# Patient Record
Sex: FEMALE | Race: WHITE | NOT HISPANIC OR LATINO | URBAN - METROPOLITAN AREA
[De-identification: names, ages, dates, MRNs, and addresses within clinical notes are randomized per-mention and may not be internally consistent; named-entity substitution may affect disease eponyms.]

---

## 2017-02-03 ENCOUNTER — EMERGENCY (EMERGENCY)
Facility: HOSPITAL | Age: 52
LOS: 1 days | Discharge: HOME | End: 2017-02-03
Admitting: EMERGENCY MEDICINE
Payer: COMMERCIAL

## 2017-02-03 DIAGNOSIS — R10.9 UNSPECIFIED ABDOMINAL PAIN: ICD-10-CM

## 2017-02-03 PROCEDURE — 36415 COLL VENOUS BLD VENIPUNCTURE: CPT

## 2017-02-03 PROCEDURE — 99284 EMERGENCY DEPT VISIT MOD MDM: CPT

## 2017-02-03 PROCEDURE — 80048 BASIC METABOLIC PNL TOTAL CA: CPT

## 2017-02-03 PROCEDURE — 96375 TX/PRO/DX INJ NEW DRUG ADDON: CPT

## 2017-02-03 PROCEDURE — 85027 COMPLETE CBC AUTOMATED: CPT

## 2017-02-03 PROCEDURE — 74176 CT ABD & PELVIS W/O CONTRAST: CPT | Mod: 26

## 2017-02-03 PROCEDURE — 74176 CT ABD & PELVIS W/O CONTRAST: CPT

## 2017-02-03 PROCEDURE — 99284 EMERGENCY DEPT VISIT MOD MDM: CPT | Mod: 25

## 2017-02-03 PROCEDURE — 96374 THER/PROPH/DIAG INJ IV PUSH: CPT

## 2018-12-02 ENCOUNTER — RESULT CHARGE (OUTPATIENT)
Age: 53
End: 2018-12-02

## 2018-12-03 ENCOUNTER — APPOINTMENT (OUTPATIENT)
Dept: OTHER | Facility: CLINIC | Age: 53
End: 2018-12-03
Payer: COMMERCIAL

## 2018-12-03 ENCOUNTER — TRANSCRIPTION ENCOUNTER (OUTPATIENT)
Age: 53
End: 2018-12-03

## 2018-12-03 VITALS
WEIGHT: 182 LBS | HEIGHT: 70.5 IN | HEART RATE: 76 BPM | OXYGEN SATURATION: 98 % | RESPIRATION RATE: 16 BRPM | DIASTOLIC BLOOD PRESSURE: 77 MMHG | SYSTOLIC BLOOD PRESSURE: 124 MMHG | BODY MASS INDEX: 25.76 KG/M2

## 2018-12-03 DIAGNOSIS — Z85.828 PERSONAL HISTORY OF OTHER MALIGNANT NEOPLASM OF SKIN: ICD-10-CM

## 2018-12-03 DIAGNOSIS — Z03.89 ENCOUNTER FOR OBSERVATION FOR OTHER SUSPECTED DISEASES AND CONDITIONS RULED OUT: ICD-10-CM

## 2018-12-03 DIAGNOSIS — Z85.820 PERSONAL HISTORY OF MALIGNANT MELANOMA OF SKIN: ICD-10-CM

## 2018-12-03 PROCEDURE — 94060 EVALUATION OF WHEEZING: CPT

## 2018-12-03 PROCEDURE — 96150: CPT

## 2018-12-03 PROCEDURE — 99386 PREV VISIT NEW AGE 40-64: CPT | Mod: 25

## 2018-12-04 LAB
ALBUMIN SERPL ELPH-MCNC: 4.3 G/DL
ALP BLD-CCNC: 40 U/L
ALT SERPL-CCNC: 8 U/L
ANION GAP SERPL CALC-SCNC: 12 MMOL/L
APPEARANCE: CLEAR
AST SERPL-CCNC: 14 U/L
BACTERIA: NEGATIVE
BASOPHILS # BLD AUTO: 0.02 K/UL
BASOPHILS NFR BLD AUTO: 0.4 %
BILIRUB SERPL-MCNC: 0.6 MG/DL
BILIRUBIN URINE: NEGATIVE
BLOOD URINE: ABNORMAL
BUN SERPL-MCNC: 13 MG/DL
CALCIUM SERPL-MCNC: 9.1 MG/DL
CHLORIDE SERPL-SCNC: 106 MMOL/L
CHOLEST SERPL-MCNC: 184 MG/DL
CHOLEST/HDLC SERPL: 3.1 RATIO
CO2 SERPL-SCNC: 25 MMOL/L
COLOR: YELLOW
CREAT SERPL-MCNC: 0.57 MG/DL
EOSINOPHIL # BLD AUTO: 0.15 K/UL
EOSINOPHIL NFR BLD AUTO: 2.8 %
GLUCOSE QUALITATIVE U: NEGATIVE MG/DL
GLUCOSE SERPL-MCNC: 79 MG/DL
HCT VFR BLD CALC: 38.2 %
HDLC SERPL-MCNC: 59 MG/DL
HGB BLD-MCNC: 12.3 G/DL
HYALINE CASTS: 1 /LPF
IMM GRANULOCYTES NFR BLD AUTO: 0 %
KETONES URINE: NEGATIVE
LDLC SERPL CALC-MCNC: 101 MG/DL
LEUKOCYTE ESTERASE URINE: ABNORMAL
LYMPHOCYTES # BLD AUTO: 2.15 K/UL
LYMPHOCYTES NFR BLD AUTO: 40.7 %
MAN DIFF?: NORMAL
MCHC RBC-ENTMCNC: 30 PG
MCHC RBC-ENTMCNC: 32.2 GM/DL
MCV RBC AUTO: 93.2 FL
MICROSCOPIC-UA: NORMAL
MONOCYTES # BLD AUTO: 0.5 K/UL
MONOCYTES NFR BLD AUTO: 9.5 %
NEUTROPHILS # BLD AUTO: 2.46 K/UL
NEUTROPHILS NFR BLD AUTO: 46.6 %
NITRITE URINE: NEGATIVE
PH URINE: 8
PLATELET # BLD AUTO: 317 K/UL
POTASSIUM SERPL-SCNC: 4.6 MMOL/L
PROT SERPL-MCNC: 6.4 G/DL
PROTEIN URINE: NEGATIVE MG/DL
RBC # BLD: 4.1 M/UL
RBC # FLD: 13 %
RED BLOOD CELLS URINE: 1 /HPF
SODIUM SERPL-SCNC: 143 MMOL/L
SPECIFIC GRAVITY URINE: 1.01
SQUAMOUS EPITHELIAL CELLS: 3 /HPF
TRIGL SERPL-MCNC: 118 MG/DL
UROBILINOGEN URINE: NEGATIVE MG/DL
WBC # FLD AUTO: 5.28 K/UL
WHITE BLOOD CELLS URINE: 2 /HPF

## 2019-01-04 ENCOUNTER — APPOINTMENT (OUTPATIENT)
Dept: PULMONOLOGY | Facility: CLINIC | Age: 54
End: 2019-01-04
Payer: COMMERCIAL

## 2019-01-04 VITALS
TEMPERATURE: 98.6 F | HEART RATE: 80 BPM | HEIGHT: 70.5 IN | WEIGHT: 187 LBS | DIASTOLIC BLOOD PRESSURE: 76 MMHG | BODY MASS INDEX: 26.47 KG/M2 | OXYGEN SATURATION: 98 % | RESPIRATION RATE: 18 BRPM | SYSTOLIC BLOOD PRESSURE: 109 MMHG

## 2019-01-04 DIAGNOSIS — J06.9 ACUTE UPPER RESPIRATORY INFECTION, UNSPECIFIED: ICD-10-CM

## 2019-01-04 PROCEDURE — ZZZZZ: CPT

## 2019-01-04 PROCEDURE — 94729 DIFFUSING CAPACITY: CPT

## 2019-01-04 PROCEDURE — 94726 PLETHYSMOGRAPHY LUNG VOLUMES: CPT

## 2019-01-04 PROCEDURE — 99204 OFFICE O/P NEW MOD 45 MIN: CPT | Mod: 25

## 2019-01-04 PROCEDURE — 94010 BREATHING CAPACITY TEST: CPT

## 2019-02-21 ENCOUNTER — FORM ENCOUNTER (OUTPATIENT)
Age: 54
End: 2019-02-21

## 2019-02-22 ENCOUNTER — APPOINTMENT (OUTPATIENT)
Dept: OTOLARYNGOLOGY | Facility: CLINIC | Age: 54
End: 2019-02-22
Payer: COMMERCIAL

## 2019-02-22 VITALS
SYSTOLIC BLOOD PRESSURE: 109 MMHG | HEART RATE: 86 BPM | WEIGHT: 187 LBS | DIASTOLIC BLOOD PRESSURE: 74 MMHG | HEIGHT: 70.5 IN | BODY MASS INDEX: 26.47 KG/M2

## 2019-02-22 DIAGNOSIS — J32.9 CHRONIC SINUSITIS, UNSPECIFIED: ICD-10-CM

## 2019-02-22 PROCEDURE — 92557 COMPREHENSIVE HEARING TEST: CPT

## 2019-02-22 PROCEDURE — 92567 TYMPANOMETRY: CPT

## 2019-02-22 PROCEDURE — 31231 NASAL ENDOSCOPY DX: CPT

## 2019-02-22 PROCEDURE — 99204 OFFICE O/P NEW MOD 45 MIN: CPT | Mod: 25

## 2019-02-22 NOTE — PHYSICAL EXAM
[Nasal Endoscopy Performed] : nasal endoscopy was performed, see procedure section for findings [] : septum deviated to the left [Midline] : trachea located in midline position [Normal] : no rashes

## 2019-02-22 NOTE — ASSESSMENT
[FreeTextEntry1] : Patient  9/11 program having bouts of recurrent sinusitis now for quite some time. Treated with numerous courses of antibiotics is here for evaluation. On examination endoscopically she has a significantly deviated septum the left nasal cavity no evidence of any tumors masses or polyps. Center for a CAT scan of the definitively evaluate her sinuses and determine if any additional interventions indicated. Also the baseline hearing test on her after examination of her ears were perfectly normal she has a slight dip at 2000 Hz bilaterally the needs to be monitored and followed. We will get back to her once reviewed a CAT scan for definitive plan.

## 2019-02-22 NOTE — REVIEW OF SYSTEMS
[Nasal Congestion] : nasal congestion [Recurrent Sinus Infections] : recurrent sinus infections [Sinus Pain] : sinus pain [Sinus Pressure] : sinus pressure [Negative] : Heme/Lymph [Ear Noises] : ear noises

## 2019-02-22 NOTE — HISTORY OF PRESENT ILLNESS
[de-identified] : Patient has had issues with nasal issues and recurrent sinus infections. She has had about 7 sinus infections int he last year and has to be treated with antibitoics each time. The last round of antibtoics was 1 month ago. She was exposed during 9/11 and isnt sure if this is related to that. SHe has not had a CT of the sinuses. WHen she gets sinus infections she gets sinus pressure under the eyes bilaterally and generally in the face that is a moderate dull and aching. She gets swelling under the eyes and in the face along with moderate to severe nasal congestion. She admits to occasional ringing in the eras but no dizzziness ormajor issues hearing

## 2019-02-26 ENCOUNTER — FORM ENCOUNTER (OUTPATIENT)
Age: 54
End: 2019-02-26

## 2019-02-27 ENCOUNTER — TRANSCRIPTION ENCOUNTER (OUTPATIENT)
Age: 54
End: 2019-02-27

## 2019-02-28 ENCOUNTER — TRANSCRIPTION ENCOUNTER (OUTPATIENT)
Age: 54
End: 2019-02-28

## 2019-03-01 ENCOUNTER — TRANSCRIPTION ENCOUNTER (OUTPATIENT)
Age: 54
End: 2019-03-01

## 2019-03-04 ENCOUNTER — FORM ENCOUNTER (OUTPATIENT)
Age: 54
End: 2019-03-04

## 2019-03-05 ENCOUNTER — APPOINTMENT (OUTPATIENT)
Dept: CT IMAGING | Facility: CLINIC | Age: 54
End: 2019-03-05
Payer: COMMERCIAL

## 2019-03-05 ENCOUNTER — OUTPATIENT (OUTPATIENT)
Dept: OUTPATIENT SERVICES | Facility: HOSPITAL | Age: 54
LOS: 1 days | End: 2019-03-05
Payer: COMMERCIAL

## 2019-03-05 DIAGNOSIS — J32.9 CHRONIC SINUSITIS, UNSPECIFIED: ICD-10-CM

## 2019-03-05 PROCEDURE — 70486 CT MAXILLOFACIAL W/O DYE: CPT

## 2019-03-05 PROCEDURE — 70486 CT MAXILLOFACIAL W/O DYE: CPT | Mod: 26

## 2019-03-12 ENCOUNTER — APPOINTMENT (OUTPATIENT)
Dept: OTOLARYNGOLOGY | Facility: CLINIC | Age: 54
End: 2019-03-12
Payer: COMMERCIAL

## 2019-03-12 VITALS
WEIGHT: 187 LBS | HEART RATE: 75 BPM | BODY MASS INDEX: 26.47 KG/M2 | DIASTOLIC BLOOD PRESSURE: 77 MMHG | HEIGHT: 70.5 IN | SYSTOLIC BLOOD PRESSURE: 106 MMHG

## 2019-03-12 DIAGNOSIS — J34.2 DEVIATED NASAL SEPTUM: ICD-10-CM

## 2019-03-12 DIAGNOSIS — R09.81 NASAL CONGESTION: ICD-10-CM

## 2019-03-12 PROCEDURE — 99214 OFFICE O/P EST MOD 30 MIN: CPT | Mod: 25

## 2019-03-12 PROCEDURE — 31231 NASAL ENDOSCOPY DX: CPT

## 2019-03-12 NOTE — HISTORY OF PRESENT ILLNESS
[de-identified] : marguerite comes in today ready to discuss surgical options as she continues to have nasal congsetion bilaterally with pain and pressure in the left side of the face that ocasionally happens as a dull ache in the right side of the face. He right aer feels clogged recently and she has been doing the nasal sprays. SHe cannot get air through the left nostril even with the nasal sprays

## 2019-03-12 NOTE — REVIEW OF SYSTEMS
[Nasal Congestion] : nasal congestion [Sinus Pain] : sinus pain [Sinus Pressure] : sinus pressure [Negative] : Heme/Lymph

## 2019-03-12 NOTE — ASSESSMENT
[FreeTextEntry1] : Patient follows up CAT scan reviewed showed a severely deviated septum she still has sinus-like symptoms of pressure we will proceed with a septoplasty turbinoplasty and functional endoscopic balloon signing the plastic risks and benefits were discussed with her and understood and all of her questions were answered.

## 2019-03-19 ENCOUNTER — APPOINTMENT (OUTPATIENT)
Dept: OTOLARYNGOLOGY | Facility: CLINIC | Age: 54
End: 2019-03-19

## 2019-04-10 ENCOUNTER — APPOINTMENT (OUTPATIENT)
Dept: OTOLARYNGOLOGY | Facility: AMBULATORY SURGERY CENTER | Age: 54
End: 2019-04-10

## 2019-04-11 ENCOUNTER — APPOINTMENT (OUTPATIENT)
Dept: OTOLARYNGOLOGY | Facility: CLINIC | Age: 54
End: 2019-04-11

## 2019-04-15 ENCOUNTER — APPOINTMENT (OUTPATIENT)
Dept: OTOLARYNGOLOGY | Facility: CLINIC | Age: 54
End: 2019-04-15

## 2019-09-03 ENCOUNTER — APPOINTMENT (OUTPATIENT)
Dept: OTHER | Facility: CLINIC | Age: 54
End: 2019-09-03

## 2019-09-03 VITALS
HEART RATE: 82 BPM | WEIGHT: 187 LBS | OXYGEN SATURATION: 95 % | SYSTOLIC BLOOD PRESSURE: 118 MMHG | DIASTOLIC BLOOD PRESSURE: 80 MMHG | HEIGHT: 70.5 IN | RESPIRATION RATE: 16 BRPM | BODY MASS INDEX: 26.47 KG/M2

## 2019-09-03 RX ORDER — NITROFURANTOIN (MONOHYDRATE/MACROCRYSTALS) 25; 75 MG/1; MG/1
100 CAPSULE ORAL
Qty: 14 | Refills: 0 | Status: DISCONTINUED | COMMUNITY
Start: 2019-02-25 | End: 2019-09-03

## 2019-09-03 RX ORDER — OMEPRAZOLE 40 MG/1
40 CAPSULE, DELAYED RELEASE ORAL
Refills: 0 | Status: DISCONTINUED | COMMUNITY
Start: 2018-12-03 | End: 2019-09-03

## 2019-09-03 RX ORDER — CIPROFLOXACIN HYDROCHLORIDE 500 MG/1
500 TABLET, FILM COATED ORAL
Qty: 10 | Refills: 0 | Status: DISCONTINUED | COMMUNITY
Start: 2018-09-26 | End: 2019-09-03

## 2019-09-03 RX ORDER — PREDNISONE 20 MG/1
20 TABLET ORAL
Qty: 10 | Refills: 0 | Status: DISCONTINUED | COMMUNITY
Start: 2019-02-01 | End: 2019-09-03

## 2019-09-03 RX ORDER — LEVOFLOXACIN 500 MG/1
500 TABLET, FILM COATED ORAL
Qty: 7 | Refills: 0 | Status: DISCONTINUED | COMMUNITY
Start: 2019-01-27 | End: 2019-09-03

## 2019-09-03 RX ORDER — CLARITHROMYCIN 500 MG/1
500 TABLET, FILM COATED ORAL
Qty: 14 | Refills: 0 | Status: DISCONTINUED | COMMUNITY
Start: 2019-01-23 | End: 2019-09-03

## 2019-09-03 RX ORDER — GUAIFENESIN AND CODEINE PHOSPHATE 10; 100 MG/5ML; MG/5ML
100-10 SOLUTION ORAL
Qty: 200 | Refills: 0 | Status: DISCONTINUED | COMMUNITY
Start: 2019-01-23 | End: 2019-09-03

## 2019-09-03 RX ORDER — OSELTAMIVIR PHOSPHATE 75 MG/1
75 CAPSULE ORAL
Qty: 10 | Refills: 0 | Status: DISCONTINUED | COMMUNITY
Start: 2019-01-23 | End: 2019-09-03

## 2019-09-16 ENCOUNTER — OUTPATIENT (OUTPATIENT)
Dept: OUTPATIENT SERVICES | Facility: HOSPITAL | Age: 54
LOS: 1 days | End: 2019-09-16
Payer: COMMERCIAL

## 2019-09-16 VITALS
TEMPERATURE: 98 F | DIASTOLIC BLOOD PRESSURE: 65 MMHG | WEIGHT: 188.05 LBS | RESPIRATION RATE: 16 BRPM | OXYGEN SATURATION: 99 % | SYSTOLIC BLOOD PRESSURE: 102 MMHG | HEART RATE: 71 BPM

## 2019-09-16 DIAGNOSIS — Z90.49 ACQUIRED ABSENCE OF OTHER SPECIFIED PARTS OF DIGESTIVE TRACT: Chronic | ICD-10-CM

## 2019-09-16 DIAGNOSIS — N62 HYPERTROPHY OF BREAST: ICD-10-CM

## 2019-09-16 DIAGNOSIS — Z01.818 ENCOUNTER FOR OTHER PREPROCEDURAL EXAMINATION: ICD-10-CM

## 2019-09-16 DIAGNOSIS — Z98.890 OTHER SPECIFIED POSTPROCEDURAL STATES: Chronic | ICD-10-CM

## 2019-09-16 LAB
HCG UR QL: NEGATIVE — SIGNIFICANT CHANGE UP
HCT VFR BLD CALC: 40 % — SIGNIFICANT CHANGE UP (ref 34.5–45)
HGB BLD-MCNC: 13.2 G/DL — SIGNIFICANT CHANGE UP (ref 11.5–15.5)
MCHC RBC-ENTMCNC: 29.7 PG — SIGNIFICANT CHANGE UP (ref 27–34)
MCHC RBC-ENTMCNC: 33 GM/DL — SIGNIFICANT CHANGE UP (ref 32–36)
MCV RBC AUTO: 90.1 FL — SIGNIFICANT CHANGE UP (ref 80–100)
NRBC # BLD: 0 /100 WBCS — SIGNIFICANT CHANGE UP (ref 0–0)
PLATELET # BLD AUTO: 329 K/UL — SIGNIFICANT CHANGE UP (ref 150–400)
RBC # BLD: 4.44 M/UL — SIGNIFICANT CHANGE UP (ref 3.8–5.2)
RBC # FLD: 12 % — SIGNIFICANT CHANGE UP (ref 10.3–14.5)
WBC # BLD: 6.74 K/UL — SIGNIFICANT CHANGE UP (ref 3.8–10.5)
WBC # FLD AUTO: 6.74 K/UL — SIGNIFICANT CHANGE UP (ref 3.8–10.5)

## 2019-09-16 PROCEDURE — 36415 COLL VENOUS BLD VENIPUNCTURE: CPT

## 2019-09-16 PROCEDURE — G0463: CPT

## 2019-09-16 PROCEDURE — 85027 COMPLETE CBC AUTOMATED: CPT

## 2019-09-16 PROCEDURE — 81025 URINE PREGNANCY TEST: CPT

## 2019-09-16 NOTE — H&P PST ADULT - LYMPHATIC
posterior cervical L/anterior cervical L/anterior cervical R/posterior cervical R/supraclavicular R/supraclavicular L

## 2019-09-16 NOTE — H&P PST ADULT - NSANTHOSAYNRD_GEN_A_CORE
Pt to have sleep study done in the future/No. PHIL screening performed.  STOP BANG Legend: 0-2 = LOW Risk; 3-4 = INTERMEDIATE Risk; 5-8 = HIGH Risk Pt to have "sleep study done in the future"/No. PHIL screening performed.  STOP BANG Legend: 0-2 = LOW Risk; 3-4 = INTERMEDIATE Risk; 5-8 = HIGH Risk

## 2019-09-16 NOTE — H&P PST ADULT - NEGATIVE CARDIOVASCULAR SYMPTOMS
no orthopnea/no paroxysmal nocturnal dyspnea/no palpitations/no dyspnea on exertion/no peripheral edema/no chest pain/no claudication

## 2019-09-16 NOTE — H&P PST ADULT - SKIN/BREAST COMMENTS
Pt with history of melanoma to right arm, seen by dermatologist and s/p biopsy of 2 indira on right neck and removal of skin tag on right upper chest. Pt will inform surgeon. Pt with history of melanoma to right arm, seen by dermatologist and s/p biopsy of 2 areas on right neck and removal of skin tag on right upper chest. Pt will inform surgeon.

## 2019-09-16 NOTE — H&P PST ADULT - NSICDXPROBLEM_GEN_ALL_CORE_FT
PROBLEM DIAGNOSES  Problem: Preoperative testing  Assessment and Plan: No medical clearance needed as per surgeon. CBC and UCG ordered. Pre-op instructions and surgical scrubs given and pt verbalized understanding. Called for last cardiac note with last echocardiogram and Cath report to be faxed and received in chart.       Problem: Hypertrophy of breast  Assessment and Plan: Bilateral breast reduction on 9/27/19.

## 2019-09-16 NOTE — H&P PST ADULT - HISTORY OF PRESENT ILLNESS
This is a 47 y/o female who presents to [facility] for H&P for laparoscopic cholecystectomy. Pt. reports a 2 month history of right abdominal pain referred to her back along with abdominal bloating and gas. She followed up with her PMD, ultrasound performed one week later and referred to surgeon. 53 yo female with PMH of GERD here for PST. Pt complaining of having chronic bilateral shoulder and back pain from hypertrophy of breasts. Pt takes Motrin PRN with moderate pain relief. Pt electing for bilateral breast reduction on 9/27/19.

## 2019-09-16 NOTE — H&P PST ADULT - NSICDXPASTMEDICALHX_GEN_ALL_CORE_FT
PAST MEDICAL HISTORY:  No Past Medical History PAST MEDICAL HISTORY:  Chronic GERD     Hypertrophy of breast     Insomnia     Seasonal allergies

## 2019-09-16 NOTE — H&P PST ADULT - RS GEN PE MLT RESP DETAILS PC
breath sounds equal/clear to auscultation bilaterally/respirations non-labored/normal/airway patent/good air movement

## 2019-09-16 NOTE — H&P PST ADULT - NSICDXPASTSURGICALHX_GEN_ALL_CORE_FT
PAST SURGICAL HISTORY:  S/P Shoulder Surgery right with titanium anchors- 1/2006, 7/2008, 11/2008 PAST SURGICAL HISTORY:  S/P laparoscopic cholecystectomy (2011)    S/P Mohs surgery for basal cell carcinoma (Right side of face, 2018)    S/P Shoulder Surgery (Right with titanium anchors- 1/2006, 2007, 7/2008 & 11/2008)

## 2019-09-26 ENCOUNTER — TRANSCRIPTION ENCOUNTER (OUTPATIENT)
Age: 54
End: 2019-09-26

## 2019-09-27 ENCOUNTER — OUTPATIENT (OUTPATIENT)
Dept: OUTPATIENT SERVICES | Facility: HOSPITAL | Age: 54
LOS: 1 days | End: 2019-09-27
Payer: COMMERCIAL

## 2019-09-27 ENCOUNTER — RESULT REVIEW (OUTPATIENT)
Age: 54
End: 2019-09-27

## 2019-09-27 VITALS
HEART RATE: 74 BPM | OXYGEN SATURATION: 95 % | DIASTOLIC BLOOD PRESSURE: 62 MMHG | SYSTOLIC BLOOD PRESSURE: 116 MMHG | RESPIRATION RATE: 16 BRPM

## 2019-09-27 VITALS
RESPIRATION RATE: 16 BRPM | WEIGHT: 188.05 LBS | OXYGEN SATURATION: 96 % | SYSTOLIC BLOOD PRESSURE: 117 MMHG | HEART RATE: 64 BPM | TEMPERATURE: 98 F | DIASTOLIC BLOOD PRESSURE: 83 MMHG

## 2019-09-27 DIAGNOSIS — Z98.890 OTHER SPECIFIED POSTPROCEDURAL STATES: Chronic | ICD-10-CM

## 2019-09-27 DIAGNOSIS — Z90.49 ACQUIRED ABSENCE OF OTHER SPECIFIED PARTS OF DIGESTIVE TRACT: Chronic | ICD-10-CM

## 2019-09-27 DIAGNOSIS — N62 HYPERTROPHY OF BREAST: ICD-10-CM

## 2019-09-27 DIAGNOSIS — Z01.818 ENCOUNTER FOR OTHER PREPROCEDURAL EXAMINATION: ICD-10-CM

## 2019-09-27 LAB — HCG UR QL: NEGATIVE — SIGNIFICANT CHANGE UP

## 2019-09-27 PROCEDURE — 88305 TISSUE EXAM BY PATHOLOGIST: CPT

## 2019-09-27 PROCEDURE — 19318 BREAST REDUCTION: CPT | Mod: 50

## 2019-09-27 PROCEDURE — 88305 TISSUE EXAM BY PATHOLOGIST: CPT | Mod: 26

## 2019-09-27 PROCEDURE — 81025 URINE PREGNANCY TEST: CPT

## 2019-09-27 RX ORDER — OXYCODONE HYDROCHLORIDE 5 MG/1
5 TABLET ORAL ONCE
Refills: 0 | Status: DISCONTINUED | OUTPATIENT
Start: 2019-09-27 | End: 2019-09-27

## 2019-09-27 RX ORDER — SODIUM CHLORIDE 9 MG/ML
1000 INJECTION, SOLUTION INTRAVENOUS
Refills: 0 | Status: DISCONTINUED | OUTPATIENT
Start: 2019-09-27 | End: 2019-09-27

## 2019-09-27 RX ORDER — ONDANSETRON 8 MG/1
4 TABLET, FILM COATED ORAL ONCE
Refills: 0 | Status: DISCONTINUED | OUTPATIENT
Start: 2019-09-27 | End: 2019-09-27

## 2019-09-27 RX ORDER — HYDROMORPHONE HYDROCHLORIDE 2 MG/ML
0.5 INJECTION INTRAMUSCULAR; INTRAVENOUS; SUBCUTANEOUS
Refills: 0 | Status: DISCONTINUED | OUTPATIENT
Start: 2019-09-27 | End: 2019-09-27

## 2019-09-27 RX ADMIN — HYDROMORPHONE HYDROCHLORIDE 0.5 MILLIGRAM(S): 2 INJECTION INTRAMUSCULAR; INTRAVENOUS; SUBCUTANEOUS at 11:33

## 2019-09-27 RX ADMIN — SODIUM CHLORIDE 100 MILLILITER(S): 9 INJECTION, SOLUTION INTRAVENOUS at 11:40

## 2019-09-27 RX ADMIN — HYDROMORPHONE HYDROCHLORIDE 0.5 MILLIGRAM(S): 2 INJECTION INTRAMUSCULAR; INTRAVENOUS; SUBCUTANEOUS at 11:22

## 2019-09-27 RX ADMIN — SODIUM CHLORIDE 75 MILLILITER(S): 9 INJECTION, SOLUTION INTRAVENOUS at 06:37

## 2019-09-27 RX ADMIN — OXYCODONE HYDROCHLORIDE 5 MILLIGRAM(S): 5 TABLET ORAL at 12:09

## 2019-09-27 NOTE — ASU DISCHARGE PLAN (ADULT/PEDIATRIC) - CALL YOUR DOCTOR IF YOU HAVE ANY OF THE FOLLOWING:
Fever greater than (need to indicate Fahrenheit or Celsius)/Wound/Surgical Site with redness, or foul smelling discharge or pus/Bleeding that does not stop/Pain not relieved by Medications

## 2019-09-27 NOTE — ASU DISCHARGE PLAN (ADULT/PEDIATRIC) - CARE PROVIDER_API CALL
Servando Robert (MD)  Plastic Surgery; Surgery of the Hand  [address] [place] [address]  Phone: [phone]  Fax: [phone]  Follow Up Time: Servando Robert (MD)  Plastic Surgery; Surgery of the Hand  [address] [place] [address]  Phone: [phone]  Fax: [phone]  Follow Up Time: 1 week

## 2019-09-27 NOTE — ASU PATIENT PROFILE, ADULT - PSH
S/P laparoscopic cholecystectomy  (2011)  S/P Mohs surgery for basal cell carcinoma  (Right side of face, 2018)  S/P Shoulder Surgery  (Right with titanium anchors- 1/2006, 2007, 7/2008 & 11/2008)

## 2019-09-27 NOTE — ASU DISCHARGE PLAN (ADULT/PEDIATRIC) - PAIN MANAGEMENT
Prescriptions electronically submitted to pharmacy from doctor's office/Rx for pain and antibiotics called to patients pharmacy from the office

## 2019-09-27 NOTE — ASU DISCHARGE PLAN (ADULT/PEDIATRIC) - ASU DC SPECIAL INSTRUCTIONSFT
Keep dressings and supportive bra on at all times.  Keep dressing clean, dry and intact at all times. May shower from the waste down.  Empty and record RANDY drain measurements separately and bring measurements to office visit  Call Dr. Robert's office for an appointment for follow up in 1 week

## 2019-10-01 LAB — SURGICAL PATHOLOGY STUDY: SIGNIFICANT CHANGE UP

## 2019-10-11 ENCOUNTER — INPATIENT (INPATIENT)
Facility: HOSPITAL | Age: 54
LOS: 1 days | Discharge: HOME | DRG: 857 | End: 2019-10-13
Attending: PLASTIC SURGERY | Admitting: PLASTIC SURGERY
Payer: COMMERCIAL

## 2019-10-11 ENCOUNTER — TRANSCRIPTION ENCOUNTER (OUTPATIENT)
Age: 54
End: 2019-10-11

## 2019-10-11 VITALS
SYSTOLIC BLOOD PRESSURE: 123 MMHG | RESPIRATION RATE: 18 BRPM | OXYGEN SATURATION: 98 % | TEMPERATURE: 98 F | DIASTOLIC BLOOD PRESSURE: 75 MMHG | HEIGHT: 70.5 IN | WEIGHT: 184.97 LBS | HEART RATE: 84 BPM

## 2019-10-11 DIAGNOSIS — Z90.49 ACQUIRED ABSENCE OF OTHER SPECIFIED PARTS OF DIGESTIVE TRACT: Chronic | ICD-10-CM

## 2019-10-11 DIAGNOSIS — L03.90 CELLULITIS, UNSPECIFIED: ICD-10-CM

## 2019-10-11 DIAGNOSIS — Z98.890 OTHER SPECIFIED POSTPROCEDURAL STATES: Chronic | ICD-10-CM

## 2019-10-11 PROBLEM — N62 HYPERTROPHY OF BREAST: Chronic | Status: ACTIVE | Noted: 2019-09-16

## 2019-10-11 PROBLEM — J30.2 OTHER SEASONAL ALLERGIC RHINITIS: Chronic | Status: ACTIVE | Noted: 2019-09-16

## 2019-10-11 PROBLEM — K21.9 GASTRO-ESOPHAGEAL REFLUX DISEASE WITHOUT ESOPHAGITIS: Chronic | Status: ACTIVE | Noted: 2019-09-16

## 2019-10-11 PROBLEM — G47.00 INSOMNIA, UNSPECIFIED: Chronic | Status: ACTIVE | Noted: 2019-09-16

## 2019-10-11 LAB
ALBUMIN SERPL ELPH-MCNC: 4.2 G/DL — SIGNIFICANT CHANGE UP (ref 3.3–5)
ALP SERPL-CCNC: 40 U/L — SIGNIFICANT CHANGE UP (ref 40–120)
ALT FLD-CCNC: 14 U/L — SIGNIFICANT CHANGE UP (ref 10–45)
ANION GAP SERPL CALC-SCNC: 12 MMOL/L — SIGNIFICANT CHANGE UP (ref 5–17)
AST SERPL-CCNC: 12 U/L — SIGNIFICANT CHANGE UP (ref 10–40)
BASE EXCESS BLDV CALC-SCNC: 1.7 MMOL/L — SIGNIFICANT CHANGE UP (ref -2–2)
BASOPHILS # BLD AUTO: 0.07 K/UL — SIGNIFICANT CHANGE UP (ref 0–0.2)
BASOPHILS NFR BLD AUTO: 0.8 % — SIGNIFICANT CHANGE UP (ref 0–2)
BILIRUB SERPL-MCNC: 0.7 MG/DL — SIGNIFICANT CHANGE UP (ref 0.2–1.2)
BUN SERPL-MCNC: 11 MG/DL — SIGNIFICANT CHANGE UP (ref 7–23)
CA-I SERPL-SCNC: 1.15 MMOL/L — SIGNIFICANT CHANGE UP (ref 1.12–1.3)
CALCIUM SERPL-MCNC: 9.2 MG/DL — SIGNIFICANT CHANGE UP (ref 8.4–10.5)
CHLORIDE BLDV-SCNC: 107 MMOL/L — SIGNIFICANT CHANGE UP (ref 96–108)
CHLORIDE SERPL-SCNC: 101 MMOL/L — SIGNIFICANT CHANGE UP (ref 96–108)
CO2 BLDV-SCNC: 28 MMOL/L — SIGNIFICANT CHANGE UP (ref 22–30)
CO2 SERPL-SCNC: 24 MMOL/L — SIGNIFICANT CHANGE UP (ref 22–31)
CREAT SERPL-MCNC: 0.62 MG/DL — SIGNIFICANT CHANGE UP (ref 0.5–1.3)
EOSINOPHIL # BLD AUTO: 0.47 K/UL — SIGNIFICANT CHANGE UP (ref 0–0.5)
EOSINOPHIL NFR BLD AUTO: 5.2 % — SIGNIFICANT CHANGE UP (ref 0–6)
GAS PNL BLDV: 134 MMOL/L — LOW (ref 135–145)
GAS PNL BLDV: SIGNIFICANT CHANGE UP
GAS PNL BLDV: SIGNIFICANT CHANGE UP
GLUCOSE BLDV-MCNC: 102 MG/DL — HIGH (ref 70–99)
GLUCOSE SERPL-MCNC: 104 MG/DL — HIGH (ref 70–99)
HCO3 BLDV-SCNC: 26 MMOL/L — SIGNIFICANT CHANGE UP (ref 21–29)
HCT VFR BLD CALC: 38.5 % — SIGNIFICANT CHANGE UP (ref 34.5–45)
HCT VFR BLDA CALC: 41 % — SIGNIFICANT CHANGE UP (ref 39–50)
HGB BLD CALC-MCNC: 13.3 G/DL — SIGNIFICANT CHANGE UP (ref 11.5–15.5)
HGB BLD-MCNC: 12.8 G/DL — SIGNIFICANT CHANGE UP (ref 11.5–15.5)
IMM GRANULOCYTES NFR BLD AUTO: 0.3 % — SIGNIFICANT CHANGE UP (ref 0–1.5)
LACTATE BLDV-MCNC: 1.4 MMOL/L — SIGNIFICANT CHANGE UP (ref 0.7–2)
LYMPHOCYTES # BLD AUTO: 2.41 K/UL — SIGNIFICANT CHANGE UP (ref 1–3.3)
LYMPHOCYTES # BLD AUTO: 26.7 % — SIGNIFICANT CHANGE UP (ref 13–44)
MCHC RBC-ENTMCNC: 29.6 PG — SIGNIFICANT CHANGE UP (ref 27–34)
MCHC RBC-ENTMCNC: 33.2 GM/DL — SIGNIFICANT CHANGE UP (ref 32–36)
MCV RBC AUTO: 88.9 FL — SIGNIFICANT CHANGE UP (ref 80–100)
MONOCYTES # BLD AUTO: 0.56 K/UL — SIGNIFICANT CHANGE UP (ref 0–0.9)
MONOCYTES NFR BLD AUTO: 6.2 % — SIGNIFICANT CHANGE UP (ref 2–14)
NEUTROPHILS # BLD AUTO: 5.48 K/UL — SIGNIFICANT CHANGE UP (ref 1.8–7.4)
NEUTROPHILS NFR BLD AUTO: 60.8 % — SIGNIFICANT CHANGE UP (ref 43–77)
NRBC # BLD: 0 /100 WBCS — SIGNIFICANT CHANGE UP (ref 0–0)
PCO2 BLDV: 44 MMHG — SIGNIFICANT CHANGE UP (ref 35–50)
PH BLDV: 7.4 — SIGNIFICANT CHANGE UP (ref 7.35–7.45)
PLATELET # BLD AUTO: 418 K/UL — HIGH (ref 150–400)
PO2 BLDV: 53 MMHG — HIGH (ref 25–45)
POTASSIUM BLDV-SCNC: 3.5 MMOL/L — SIGNIFICANT CHANGE UP (ref 3.5–5.3)
POTASSIUM SERPL-MCNC: 3.7 MMOL/L — SIGNIFICANT CHANGE UP (ref 3.5–5.3)
POTASSIUM SERPL-SCNC: 3.7 MMOL/L — SIGNIFICANT CHANGE UP (ref 3.5–5.3)
PROT SERPL-MCNC: 7 G/DL — SIGNIFICANT CHANGE UP (ref 6–8.3)
RBC # BLD: 4.33 M/UL — SIGNIFICANT CHANGE UP (ref 3.8–5.2)
RBC # FLD: 12 % — SIGNIFICANT CHANGE UP (ref 10.3–14.5)
SAO2 % BLDV: 87 % — SIGNIFICANT CHANGE UP (ref 67–88)
SODIUM SERPL-SCNC: 137 MMOL/L — SIGNIFICANT CHANGE UP (ref 135–145)
WBC # BLD: 9.02 K/UL — SIGNIFICANT CHANGE UP (ref 3.8–10.5)
WBC # FLD AUTO: 9.02 K/UL — SIGNIFICANT CHANGE UP (ref 3.8–10.5)

## 2019-10-11 PROCEDURE — 93010 ELECTROCARDIOGRAM REPORT: CPT

## 2019-10-11 PROCEDURE — 99285 EMERGENCY DEPT VISIT HI MDM: CPT

## 2019-10-11 PROCEDURE — 76641 ULTRASOUND BREAST COMPLETE: CPT | Mod: 26,LT

## 2019-10-11 RX ORDER — ACETAMINOPHEN 500 MG
650 TABLET ORAL EVERY 6 HOURS
Refills: 0 | Status: DISCONTINUED | OUTPATIENT
Start: 2019-10-11 | End: 2019-10-12

## 2019-10-11 RX ORDER — SODIUM CHLORIDE 9 MG/ML
1000 INJECTION, SOLUTION INTRAVENOUS
Refills: 0 | Status: DISCONTINUED | OUTPATIENT
Start: 2019-10-12 | End: 2019-10-12

## 2019-10-11 RX ORDER — ENOXAPARIN SODIUM 100 MG/ML
40 INJECTION SUBCUTANEOUS DAILY
Refills: 0 | Status: DISCONTINUED | OUTPATIENT
Start: 2019-10-11 | End: 2019-10-12

## 2019-10-11 RX ORDER — ZOLPIDEM TARTRATE 10 MG/1
5 TABLET ORAL AT BEDTIME
Refills: 0 | Status: DISCONTINUED | OUTPATIENT
Start: 2019-10-11 | End: 2019-10-12

## 2019-10-11 RX ADMIN — Medication 650 MILLIGRAM(S): at 23:30

## 2019-10-11 RX ADMIN — Medication 100 MILLIGRAM(S): at 22:47

## 2019-10-11 RX ADMIN — ZOLPIDEM TARTRATE 5 MILLIGRAM(S): 10 TABLET ORAL at 23:19

## 2019-10-11 RX ADMIN — ENOXAPARIN SODIUM 40 MILLIGRAM(S): 100 INJECTION SUBCUTANEOUS at 22:47

## 2019-10-11 RX ADMIN — Medication 100 MILLIGRAM(S): at 14:58

## 2019-10-11 RX ADMIN — Medication 650 MILLIGRAM(S): at 22:47

## 2019-10-11 NOTE — H&P ADULT - NSHPPHYSICALEXAM_GEN_ALL_CORE
Physical Examination:  GEN: NAD, resting quietly  NEURO: AAOx3, CN II-XII grossly intact, no focal deficits  PULM: symmetric chest rise bilaterally, no increased WOB. CTAB   Cardiovascular: RRR, normal S1/S2, no gallops or murmurs  Breast: well demarcated erythema of the left breast under the nipple on both the medial and lateral aspect. Tender to palpation throughout this region extending laterally to the axilla a few cm past where the erythema ends. No erythema inferior to the IMF. No palpable collection noted, and no warmth appreciated. Incisions appear CDI.     Right breast incisions CDI, no collections, no erythema.     ABD: soft, nontender, nondistended, incision CDI  EXTR: no cyanosis or edema, moving all extremities

## 2019-10-11 NOTE — H&P ADULT - NSTOBACCOSCREENHP_GEN_A_NCS
Care coordination outreach following ED visit to [facility].  Patient presented to ED on 4/4/18 after suffering a fall at home.  Patient d/c to home and self-care.  Discharge instructions reviewed.  Patient reports being back to baseline and demonstrates teach back of s/s of concussion such as dizziness/pain/nausea and vision disturbance.  She denies all s/s and reports only bruising around her eyes and forehead.  She declines CC assistance with scheduling f/u preferring to see her PCP on regular schedule.  Patient reports being independent with care and has friends/family for support.  CC contact information provided.  No other known acute events past 12 months.   
No

## 2019-10-11 NOTE — ED PROVIDER NOTE - NS ED ROS FT
GENERAL: no fever, +CHILLS  EYES: no change in vision  HEENT: no trouble swallowing or speaking  CARDIAC: no chest pain, no palpitations   PULMONARY: no cough, no shortness of breath, no wheezing  GI: no abdominal pain, no nausea, no vomiting, no diarrhea, no constipation  SKIN: +REDNESS/SWELLING/PAIN OF LEFT BREAST  NEURO: no headache, no numbness, no weakness  MSK: no joint pain, no muscle pain, no back pain, no calf pain     -Rosendo Laws, PGY-1

## 2019-10-11 NOTE — ED PROVIDER NOTE - CLINICAL SUMMARY MEDICAL DECISION MAKING FREE TEXT BOX
54 year old female no pertinent PMH p/w left breast pain/swelling/erythema. S/p breast reduction 2 wk ago by Dr. Robert. Appears to have overlying cellulitic infection, possible mastitis. Plan for septic workup w/ labs and bcx, Clindamycin for empiric coverage. Will d/w Dr. Robert for imaging - US vs CT. Will reassess and dispo accordingly.

## 2019-10-11 NOTE — ED PROVIDER NOTE - ATTENDING CONTRIBUTION TO CARE
I performed a history and physical exam of the patient and discussed their management with the resident. I reviewed the resident's note and agree with the documented findings and plan of care.  Glenna Almonte MD

## 2019-10-11 NOTE — H&P ADULT - ASSESSMENT
54 year old woman with PMH of Prinzmetal angina, GERD, asthma, BCC and melanoma POD 14 s/p bilateral breast reduction complicated by new onset L breast cellulitis POD 9-14 week refractory to oral antibiotics. U/S noted 6.5 cm mildly complicated collection in the 3-4:00 left breast. She remains afebrile, with a normal white count, and hemodynamically stable so suspicion for systemic infection is low. We will admit to PRS service for IV antibiotics and plan for exploration and washout tomorrow in the OR.    Plan  - admit to PRS  - continue IV clindamycin until we get cx and tailor antibiotics  - consent for OR tomorrow with Dr. Robert for exploration L breast and washout  - NPO @ midnight  - Re-start home ambien for sleep tonight  - f/u urine HCG   - discussed with Dr. Robert

## 2019-10-11 NOTE — ED ADULT NURSE NOTE - OBJECTIVE STATEMENT
Female 54 years old s/p left breast reduction at [facility] 2 weeks ago came in for possible infection at surgical site. Pt reports since Monday she has redness, tenderness and swelling of her left breast associated with chills and nausea. Started on Cefadroxil by PCP Tuesday. With mild discomfort at left breast. Left breast warm to touch. No drainage noted, denies fever, nausea, vomiting, chest pain or sob. Will continue to reassess.

## 2019-10-11 NOTE — H&P ADULT - HISTORY OF PRESENT ILLNESS
Patient is a 54 year old woman with PMH of melanoma, BCC, prinzmetal angina, asthma, and GERD who is POD 14 s/p bilateral breast reduction who is presenting for left breast cellulitis. Her initial post op course was uncomplicated and drains were removed in the office. On POD 9 (Sunday) she noticed some redness in the left breast under the nipple, though it was not painful and she had no fever or chills. On Monday she saw Dr. Robert in the office and was prescribed Cefadroxyl, which she started Tuesday. Over the course of Tuesday and Wednesday she felt the erythema was worsening and the breast was becoming more swollen and more painful, so she revisited the doctors office Thursday, who suggested she continue the antibiotics. Today, Friday, she felt the breast was becoming even more swollen and painful, with some warmth so she presented to the emergency. She denies sanguinous or purulent discharge from the wounds or the nipple during this week. She denies fever and chills at any time this week. Patient is a 54 year old woman with PMH of melanoma, BCC, prinzmetal angina, asthma, and GERD who is POD 14 s/p bilateral breast reduction who is presenting for left breast cellulitis. Her initial post op course was uncomplicated and drains were removed in the office. On POD 9 (Sunday) she noticed some redness in the left breast under the nipple, though it was not painful and she had no fever or chills. On Monday she saw Dr. Robert in the office.  The redness continued during the week and she was prescribed Cefadroxyl, which she started on POD 12.  She was seen again on POD 13 and continued with antibiotics as there was no pain and swelling.   On POD 14 she felt the breast was becoming  more swollen and painful, with some warmth so she was told to go to the ER. She denies sanguinous or purulent discharge from the wounds or the nipple during this week. She denies fever and chills at any time this week.

## 2019-10-11 NOTE — ED PROVIDER NOTE - PROGRESS NOTE DETAILS
DH: Spoke w/ Dr. Robert regarding patient and imaging - advised US preferred. Will order and d/w radiology for expedited scan. DH: US showing 6.5cm mildly complicated collection of left breast. Dr. Robert at bedside w/ patient.

## 2019-10-11 NOTE — ED PROVIDER NOTE - OBJECTIVE STATEMENT
54 year old female no significant PMH p/w left breast pain. Patient is s/p b/l breast reduction 2 weeks ago by Dr. Robert. States this week she noticed redness, swelling, and pain to left breast. Contacted Dr. Robert and started on Cefadroxil on Tuesday. Patient states pain/swelling has been worsening since. Taking Ibuprofen w/o improvement. Advised by Dr. Robert to come to ER for evaluation. A/w chills but denies fever and discharge. No chest pain, shortness of breath, abdominal pain, nausea, vomiting, diarrhea, constipation, or dysuria. 54 year old female no significant PMH p/w left breast pain. Patient is s/p b/l breast reduction 2 weeks ago by Dr. Robert. States this week she noticed redness, swelling, and pain to left breast. Pain is located in the lower breast quadrants and radiates into the left axilla. Contacted Dr. Robert and started on Cefadroxil on Tuesday. Patient states pain/swelling has been worsening since. Taking Ibuprofen w/o improvement. Advised by Dr. Robert to come to ER for evaluation. A/w chills but denies fever and discharge. No chest pain, shortness of breath, abdominal pain, nausea, vomiting, diarrhea, constipation, or dysuria.

## 2019-10-11 NOTE — PATIENT PROFILE ADULT - COMPLETE THE FOLLOWING IF THE PATIENT REFUSES THE INFLUENZA VACCINE:
Vaccine Information Sheet (VIS) provided-VIS date: 8/15/19/Risks/benefits discussed with patient/surrogate Risks/benefits discussed with patient/surrogate

## 2019-10-11 NOTE — H&P ADULT - NSICDXPASTMEDICALHX_GEN_ALL_CORE_FT
PAST MEDICAL HISTORY:  Chronic GERD     H/O Prinzmetal angina     Hypertrophy of breast     Insomnia     Malignant melanoma, unspecified site R arm    Seasonal allergies

## 2019-10-11 NOTE — ED PROVIDER NOTE - PMH
Chronic GERD    Hypertrophy of breast    Insomnia    Malignant melanoma, unspecified site    Seasonal allergies

## 2019-10-11 NOTE — ED PROVIDER NOTE - PHYSICAL EXAMINATION
GENERAL: A&Ox3, non-toxic appearing, no acute distress  HEENT: NCAT, EOMI, oral mucosa moist, normal conjunctiva  RESP: CTAB, no respiratory distress, no wheezes/rhonchi/rales, speaking in full sentences  CV: RRR, no murmurs/rubs/gallops  ABDOMEN: soft, non-tender, non-distended, no guarding  MSK: no visible deformities  NEURO: no focal sensory or motor deficits, normal CN exam   BREAST (Chaperone: Dr. Almonte): swelling, erythema, and warmth noted to left breast, no discharge, incision C/D/I  SKIN: warm, normal color except as noted above  PSYCH: normal affect    -Rosendo Laws, PGY-1 GENERAL: A&Ox3, non-toxic appearing, no acute distress  HEENT: NCAT, EOMI, oral mucosa moist, normal conjunctiva  RESP: CTAB, no respiratory distress, no wheezes/rhonchi/rales, speaking in full sentences  CV: RRR, no murmurs/rubs/gallops  ABDOMEN: soft, non-tender, non-distended, no guarding  MSK: no visible deformities  NEURO: no focal sensory or motor deficits, normal CN exam   BREAST (Chaperone: Dr. Almonte): swelling, erythema, and warmth noted to left breast in lower quadrants, no discharge, incision C/D/I  SKIN: warm, normal color except as noted above  PSYCH: normal affect    -Rosendo Laws, PGY-1

## 2019-10-11 NOTE — H&P ADULT - NSHPLABSRESULTS_GEN_ALL_CORE
Labs:                  12.8   9.02  )-----------( 418      ( 11 Oct 2019 15:10 )             38.5   10-11    137  |  101  |  11  ----------------------------<  104<H>  3.7   |  24  |  0.62    Ca    9.2      11 Oct 2019 15:10    TPro  7.0  /  Alb  4.2  /  TBili  0.7  /  DBili  x   /  AST  12  /  ALT  14  /  AlkPhos  40  10-11    < from: US Breast Complete, Left (10.11.19 @ 16:14) >      EXAM:  US BREAST COMPLETE LT                            PROCEDURE DATE:  10/11/2019            INTERPRETATION:  CLINICAL INDICATION: Recent breast reduction 2 weeks   prior. Mastitis.  Rule out abscess.    TECHNIQUE:  Limited sonographic evaluationof the left breast was   performed, targeted to the area of pain in the outer breast. ?Evaluation   was performed by the technologist.  This study was performed exclusively   for the purpose of excluding the presence of abscess in the clinical   setting of mastitis.?   ?  FINDINGS: At 3-4 o'clock, 12 cm from the nipple, there is a 6.5 x 2.9 x   3.8 cm mildly complicated collection without regional hyperemia.  ?  IMPRESSION:    A 6.5 cm mildly complicated collection in the 3-4:00 left breast.    If symptoms do not resolve clinically, additional imaging in a dedicated   breast imaging center should be performed to exclude the possibility of   malignancy.    < end of copied text >

## 2019-10-11 NOTE — H&P ADULT - NSICDXPASTSURGICALHX_GEN_ALL_CORE_FT
PAST SURGICAL HISTORY:  S/P laparoscopic cholecystectomy (2011)    S/P Mohs surgery for basal cell carcinoma (Right side of face, 2018)    S/P Shoulder Surgery (Right with titanium anchors- 1/2006, 2007, 7/2008 & 11/2008)

## 2019-10-12 LAB
ANION GAP SERPL CALC-SCNC: 12 MMOL/L — SIGNIFICANT CHANGE UP (ref 5–17)
APTT BLD: 31.2 SEC — SIGNIFICANT CHANGE UP (ref 27.5–36.3)
BLD GP AB SCN SERPL QL: NEGATIVE — SIGNIFICANT CHANGE UP
BUN SERPL-MCNC: 10 MG/DL — SIGNIFICANT CHANGE UP (ref 7–23)
CALCIUM SERPL-MCNC: 8.2 MG/DL — LOW (ref 8.4–10.5)
CHLORIDE SERPL-SCNC: 105 MMOL/L — SIGNIFICANT CHANGE UP (ref 96–108)
CO2 SERPL-SCNC: 24 MMOL/L — SIGNIFICANT CHANGE UP (ref 22–31)
CREAT SERPL-MCNC: 0.62 MG/DL — SIGNIFICANT CHANGE UP (ref 0.5–1.3)
GLUCOSE SERPL-MCNC: 85 MG/DL — SIGNIFICANT CHANGE UP (ref 70–99)
HCG UR QL: NEGATIVE — SIGNIFICANT CHANGE UP
HCT VFR BLD CALC: 36.9 % — SIGNIFICANT CHANGE UP (ref 34.5–45)
HGB BLD-MCNC: 12.5 G/DL — SIGNIFICANT CHANGE UP (ref 11.5–15.5)
INR BLD: 1.03 RATIO — SIGNIFICANT CHANGE UP (ref 0.88–1.16)
LACTATE BLDV-MCNC: 2 MMOL/L — SIGNIFICANT CHANGE UP (ref 0.7–2)
MCHC RBC-ENTMCNC: 29.9 PG — SIGNIFICANT CHANGE UP (ref 27–34)
MCHC RBC-ENTMCNC: 33.9 GM/DL — SIGNIFICANT CHANGE UP (ref 32–36)
MCV RBC AUTO: 88.3 FL — SIGNIFICANT CHANGE UP (ref 80–100)
NRBC # BLD: 0 /100 WBCS — SIGNIFICANT CHANGE UP (ref 0–0)
PLATELET # BLD AUTO: 410 K/UL — HIGH (ref 150–400)
POTASSIUM SERPL-MCNC: 4 MMOL/L — SIGNIFICANT CHANGE UP (ref 3.5–5.3)
POTASSIUM SERPL-SCNC: 4 MMOL/L — SIGNIFICANT CHANGE UP (ref 3.5–5.3)
PROTHROM AB SERPL-ACNC: 11.8 SEC — SIGNIFICANT CHANGE UP (ref 10–12.9)
RBC # BLD: 4.18 M/UL — SIGNIFICANT CHANGE UP (ref 3.8–5.2)
RBC # FLD: 12.1 % — SIGNIFICANT CHANGE UP (ref 10.3–14.5)
RH IG SCN BLD-IMP: POSITIVE — SIGNIFICANT CHANGE UP
SODIUM SERPL-SCNC: 141 MMOL/L — SIGNIFICANT CHANGE UP (ref 135–145)
WBC # BLD: 8.92 K/UL — SIGNIFICANT CHANGE UP (ref 3.8–10.5)
WBC # FLD AUTO: 8.92 K/UL — SIGNIFICANT CHANGE UP (ref 3.8–10.5)

## 2019-10-12 RX ORDER — ENOXAPARIN SODIUM 100 MG/ML
40 INJECTION SUBCUTANEOUS DAILY
Refills: 0 | Status: DISCONTINUED | OUTPATIENT
Start: 2019-10-12 | End: 2019-10-13

## 2019-10-12 RX ORDER — HYDROMORPHONE HYDROCHLORIDE 2 MG/ML
0.5 INJECTION INTRAMUSCULAR; INTRAVENOUS; SUBCUTANEOUS EVERY 4 HOURS
Refills: 0 | Status: DISCONTINUED | OUTPATIENT
Start: 2019-10-12 | End: 2019-10-13

## 2019-10-12 RX ORDER — ACETAMINOPHEN 500 MG
650 TABLET ORAL EVERY 6 HOURS
Refills: 0 | Status: DISCONTINUED | OUTPATIENT
Start: 2019-10-12 | End: 2019-10-13

## 2019-10-12 RX ORDER — OXYCODONE HYDROCHLORIDE 5 MG/1
5 TABLET ORAL EVERY 4 HOURS
Refills: 0 | Status: DISCONTINUED | OUTPATIENT
Start: 2019-10-12 | End: 2019-10-13

## 2019-10-12 RX ORDER — ZOLPIDEM TARTRATE 10 MG/1
5 TABLET ORAL AT BEDTIME
Refills: 0 | Status: DISCONTINUED | OUTPATIENT
Start: 2019-10-12 | End: 2019-10-13

## 2019-10-12 RX ORDER — HYDROMORPHONE HYDROCHLORIDE 2 MG/ML
0.5 INJECTION INTRAMUSCULAR; INTRAVENOUS; SUBCUTANEOUS
Refills: 0 | Status: DISCONTINUED | OUTPATIENT
Start: 2019-10-12 | End: 2019-10-12

## 2019-10-12 RX ORDER — ONDANSETRON 8 MG/1
4 TABLET, FILM COATED ORAL ONCE
Refills: 0 | Status: DISCONTINUED | OUTPATIENT
Start: 2019-10-12 | End: 2019-10-12

## 2019-10-12 RX ADMIN — SODIUM CHLORIDE 100 MILLILITER(S): 9 INJECTION, SOLUTION INTRAVENOUS at 00:12

## 2019-10-12 RX ADMIN — HYDROMORPHONE HYDROCHLORIDE 0.5 MILLIGRAM(S): 2 INJECTION INTRAMUSCULAR; INTRAVENOUS; SUBCUTANEOUS at 10:30

## 2019-10-12 RX ADMIN — OXYCODONE HYDROCHLORIDE 5 MILLIGRAM(S): 5 TABLET ORAL at 18:02

## 2019-10-12 RX ADMIN — Medication 100 MILLIGRAM(S): at 05:15

## 2019-10-12 RX ADMIN — OXYCODONE HYDROCHLORIDE 5 MILLIGRAM(S): 5 TABLET ORAL at 18:47

## 2019-10-12 RX ADMIN — HYDROMORPHONE HYDROCHLORIDE 0.5 MILLIGRAM(S): 2 INJECTION INTRAMUSCULAR; INTRAVENOUS; SUBCUTANEOUS at 15:34

## 2019-10-12 RX ADMIN — HYDROMORPHONE HYDROCHLORIDE 0.5 MILLIGRAM(S): 2 INJECTION INTRAMUSCULAR; INTRAVENOUS; SUBCUTANEOUS at 10:00

## 2019-10-12 RX ADMIN — HYDROMORPHONE HYDROCHLORIDE 0.5 MILLIGRAM(S): 2 INJECTION INTRAMUSCULAR; INTRAVENOUS; SUBCUTANEOUS at 21:27

## 2019-10-12 RX ADMIN — HYDROMORPHONE HYDROCHLORIDE 0.5 MILLIGRAM(S): 2 INJECTION INTRAMUSCULAR; INTRAVENOUS; SUBCUTANEOUS at 15:50

## 2019-10-12 RX ADMIN — HYDROMORPHONE HYDROCHLORIDE 0.5 MILLIGRAM(S): 2 INJECTION INTRAMUSCULAR; INTRAVENOUS; SUBCUTANEOUS at 10:15

## 2019-10-12 RX ADMIN — Medication 100 MILLIGRAM(S): at 21:21

## 2019-10-12 RX ADMIN — HYDROMORPHONE HYDROCHLORIDE 0.5 MILLIGRAM(S): 2 INJECTION INTRAMUSCULAR; INTRAVENOUS; SUBCUTANEOUS at 21:08

## 2019-10-12 RX ADMIN — ENOXAPARIN SODIUM 40 MILLIGRAM(S): 100 INJECTION SUBCUTANEOUS at 14:35

## 2019-10-12 RX ADMIN — Medication 100 MILLIGRAM(S): at 14:36

## 2019-10-12 RX ADMIN — OXYCODONE HYDROCHLORIDE 5 MILLIGRAM(S): 5 TABLET ORAL at 13:30

## 2019-10-12 RX ADMIN — OXYCODONE HYDROCHLORIDE 5 MILLIGRAM(S): 5 TABLET ORAL at 12:45

## 2019-10-12 NOTE — PRE-ANESTHESIA EVALUATION ADULT - NSANTHOSAYNRD_GEN_A_CORE
Pt to have "sleep study done in the future"/No. PHIL screening performed.  STOP BANG Legend: 0-2 = LOW Risk; 3-4 = INTERMEDIATE Risk; 5-8 = HIGH Risk

## 2019-10-13 ENCOUNTER — TRANSCRIPTION ENCOUNTER (OUTPATIENT)
Age: 54
End: 2019-10-13

## 2019-10-13 VITALS
DIASTOLIC BLOOD PRESSURE: 60 MMHG | SYSTOLIC BLOOD PRESSURE: 97 MMHG | HEART RATE: 76 BPM | RESPIRATION RATE: 16 BRPM | TEMPERATURE: 98 F | OXYGEN SATURATION: 95 %

## 2019-10-13 PROCEDURE — 80048 BASIC METABOLIC PNL TOTAL CA: CPT

## 2019-10-13 PROCEDURE — 87040 BLOOD CULTURE FOR BACTERIA: CPT

## 2019-10-13 PROCEDURE — 76641 ULTRASOUND BREAST COMPLETE: CPT

## 2019-10-13 PROCEDURE — 82330 ASSAY OF CALCIUM: CPT

## 2019-10-13 PROCEDURE — 83605 ASSAY OF LACTIC ACID: CPT

## 2019-10-13 PROCEDURE — 85730 THROMBOPLASTIN TIME PARTIAL: CPT

## 2019-10-13 PROCEDURE — 80053 COMPREHEN METABOLIC PANEL: CPT

## 2019-10-13 PROCEDURE — 96374 THER/PROPH/DIAG INJ IV PUSH: CPT

## 2019-10-13 PROCEDURE — 86901 BLOOD TYPING SEROLOGIC RH(D): CPT

## 2019-10-13 PROCEDURE — 84295 ASSAY OF SERUM SODIUM: CPT

## 2019-10-13 PROCEDURE — 87070 CULTURE OTHR SPECIMN AEROBIC: CPT

## 2019-10-13 PROCEDURE — 81025 URINE PREGNANCY TEST: CPT

## 2019-10-13 PROCEDURE — 82803 BLOOD GASES ANY COMBINATION: CPT

## 2019-10-13 PROCEDURE — 86900 BLOOD TYPING SEROLOGIC ABO: CPT

## 2019-10-13 PROCEDURE — 82435 ASSAY OF BLOOD CHLORIDE: CPT

## 2019-10-13 PROCEDURE — 87075 CULTR BACTERIA EXCEPT BLOOD: CPT

## 2019-10-13 PROCEDURE — 85014 HEMATOCRIT: CPT

## 2019-10-13 PROCEDURE — 93005 ELECTROCARDIOGRAM TRACING: CPT

## 2019-10-13 PROCEDURE — 99285 EMERGENCY DEPT VISIT HI MDM: CPT | Mod: 25

## 2019-10-13 PROCEDURE — 82947 ASSAY GLUCOSE BLOOD QUANT: CPT

## 2019-10-13 PROCEDURE — 85610 PROTHROMBIN TIME: CPT

## 2019-10-13 PROCEDURE — 84132 ASSAY OF SERUM POTASSIUM: CPT

## 2019-10-13 PROCEDURE — 85027 COMPLETE CBC AUTOMATED: CPT

## 2019-10-13 PROCEDURE — 86850 RBC ANTIBODY SCREEN: CPT

## 2019-10-13 RX ADMIN — Medication 650 MILLIGRAM(S): at 07:30

## 2019-10-13 RX ADMIN — Medication 100 MILLIGRAM(S): at 06:53

## 2019-10-13 RX ADMIN — Medication 650 MILLIGRAM(S): at 06:46

## 2019-10-13 NOTE — DISCHARGE NOTE NURSING/CASE MANAGEMENT/SOCIAL WORK - NSDCPNINST_GEN_ALL_CORE
Call MD or go to ER if severe left breast pain not relieved with pain meds, nausea, vomiting, fever, signs and symptoms of infections.

## 2019-10-13 NOTE — DISCHARGE NOTE PROVIDER - NSDCACTIVITY_GEN_ALL_CORE
Walking - Indoors allowed/Return to Work/School allowed/Stairs allowed/Walking - Outdoors allowed/Driving allowed/No heavy lifting/straining

## 2019-10-13 NOTE — PROGRESS NOTE ADULT - ASSESSMENT
54 year old woman with PMH of Prinzmetal angina, GERD, asthma, BCC and melanoma POD 16 s/p bilateral breast reduction complicated by new onset L breast cellulitis now POD 1 s/p incision and drainage of seroma of the left breast, recovering appropriately.    Plan:  - discharge home today  - follow up with Dr. Robert in 1 week  - Tylenol/motrin for pain  - will discuss with Dr. Robert about dressing

## 2019-10-13 NOTE — DISCHARGE NOTE NURSING/CASE MANAGEMENT/SOCIAL WORK - PATIENT PORTAL LINK FT
You can access the FollowMyHealth Patient Portal offered by [facility] by registering at the following website: [email or web address]. By joining [employer]’s FollowMyHealth portal, you will also be able to view your health information using other applications (apps) compatible with our system.

## 2019-10-13 NOTE — DISCHARGE NOTE NURSING/CASE MANAGEMENT/SOCIAL WORK - NSDCPNDISPN_GEN_ALL_CORE
Side effects of pain management treatment/Safe use, storage and disposal of opioids when prescribed/Education provided on the pain management plan of care/Opioids not applicable/not prescribed/Activities of daily living, including home environment that might     exacerbate pain or reduce effectiveness of the pain management plan of care as well as strategies to address these issues

## 2019-10-13 NOTE — PROGRESS NOTE ADULT - SUBJECTIVE AND OBJECTIVE BOX
POD #: 1    No acute events overnight    SUBJECTIVE:  Reports minimal pain controlled well with tylenol, no issues with pain overnight though she is sore. She reports no fever/ chills and has had no drainage from the breast since the operation. She is comfortable with discharge and will follow up with Dr. david this week    OBJECTIVE:  Vital Signs Last 24 Hrs  T(C): 36.7 (13 Oct 2019 09:00), Max: 36.8 (12 Oct 2019 16:00)  T(F): 98.1 (13 Oct 2019 09:00), Max: 98.2 (12 Oct 2019 16:00)  HR: 76 (13 Oct 2019 09:00) (70 - 82)  BP: 97/60 (13 Oct 2019 09:00) (91/60 - 110/68)  BP(mean): 75 (12 Oct 2019 11:30) (67 - 75)  RR: 16 (13 Oct 2019 09:00) (16 - 18)  SpO2: 95% (13 Oct 2019 09:00) (93% - 98%)    I&O's Detail    12 Oct 2019 07:01  -  13 Oct 2019 07:00  --------------------------------------------------------  IN:    IV PiggyBack: 50 mL    Oral Fluid: 1800 mL  Total IN: 1850 mL    OUT:    Bulb: 40 mL    Voided: 1850 mL  Total OUT: 1890 mL    Total NET: -40 mL      13 Oct 2019 07:01  -  13 Oct 2019 09:28  --------------------------------------------------------  IN:    Oral Fluid: 360 mL  Total IN: 360 mL    OUT:    Bulb: 5 mL    Voided: 600 mL  Total OUT: 605 mL    Total NET: -245 mL          Inpatient Medications:  MEDICATIONS  (STANDING):  clindamycin IVPB 900 milliGRAM(s) IV Intermittent every 8 hours  enoxaparin Injectable 40 milliGRAM(s) SubCutaneous daily    MEDICATIONS  (PRN):  acetaminophen   Tablet .. 650 milliGRAM(s) Oral every 6 hours PRN Mild Pain (1 - 3), Moderate Pain (4 - 6), Severe Pain (7 - 10)  HYDROmorphone  Injectable 0.5 milliGRAM(s) IV Push every 4 hours PRN Severe Pain (7 - 10)  oxyCODONE    IR 5 milliGRAM(s) Oral every 4 hours PRN Moderate Pain (4 - 6)  zolpidem 5 milliGRAM(s) Oral at bedtime PRN Insomnia      Physical Examination:  GEN: NAD, resting quietly  NEURO: AAOx3, CN II-XII grossly intact, no focal deficits  PULM: symmetric chest rise bilaterally, no increased WOB  Breast: Incision covered with dressing, no erythema noted in the left breast. breasts symmetrical and other incisions healing well    LABS:                        12.5   8.92  )-----------( 410      ( 12 Oct 2019 05:55 )             36.9       10-12    141  |  105  |  10  ----------------------------<  85  4.0   |  24  |  0.62    Ca    8.2<L>      12 Oct 2019 05:55    TPro  7.0  /  Alb  4.2  /  TBili  0.7  /  DBili  x   /  AST  12  /  ALT  14  /  AlkPhos  40  10-11      CULTURES:  .Blood  10-11 @ 18:21   No growth to date.  --  --          IMAGING:  []

## 2019-10-13 NOTE — DISCHARGE NOTE PROVIDER - NSDCFUADDINST_GEN_ALL_CORE_FT
You may use tylenol or motrin for pain control every 6 hours.  We suggest staggering the tylenol motrin every 3 hours for maximum pain relief.     You can take dressing off tomorrow, and you may sponge bathe the breast around the drain. Do not soak in the tub, pool, or ocean. You have steri-strips in place under the dressing, they will fall off on their own, do not pull them off. Please keep track of the color and amount of output in the drain so Dr. Robert can discuss on Thursday.    You will follow up outpatient with Dr. Robert to monitor your progress on Thursday. Please call the number listed to set up a time.     You should call the doctor's office if you develop increased pain and redness of the left breast, or if the breast begins to drain foul smelling green/white fluid from surgical site. If the doctor's office is not open or you feel this is an emergency, please visit the nearest emergency room.

## 2019-10-15 PROBLEM — Z86.79 PERSONAL HISTORY OF OTHER DISEASES OF THE CIRCULATORY SYSTEM: Chronic | Status: ACTIVE | Noted: 2019-10-11

## 2019-10-15 PROBLEM — C43.9 MALIGNANT MELANOMA OF SKIN, UNSPECIFIED: Chronic | Status: ACTIVE | Noted: 2019-10-11

## 2019-10-16 LAB
CULTURE RESULTS: SIGNIFICANT CHANGE UP
CULTURE RESULTS: SIGNIFICANT CHANGE UP
SPECIMEN SOURCE: SIGNIFICANT CHANGE UP
SPECIMEN SOURCE: SIGNIFICANT CHANGE UP

## 2019-11-26 ENCOUNTER — APPOINTMENT (OUTPATIENT)
Dept: OTHER | Facility: CLINIC | Age: 54
End: 2019-11-26
Payer: COMMERCIAL

## 2019-12-09 ENCOUNTER — LABORATORY RESULT (OUTPATIENT)
Age: 54
End: 2019-12-09

## 2019-12-09 ENCOUNTER — APPOINTMENT (OUTPATIENT)
Dept: OTHER | Facility: CLINIC | Age: 54
End: 2019-12-09
Payer: COMMERCIAL

## 2019-12-09 VITALS
HEIGHT: 70 IN | SYSTOLIC BLOOD PRESSURE: 114 MMHG | DIASTOLIC BLOOD PRESSURE: 76 MMHG | RESPIRATION RATE: 16 BRPM | WEIGHT: 195 LBS | BODY MASS INDEX: 27.92 KG/M2 | OXYGEN SATURATION: 95 % | HEART RATE: 78 BPM

## 2019-12-09 PROCEDURE — 99214 OFFICE O/P EST MOD 30 MIN: CPT | Mod: 25

## 2019-12-09 PROCEDURE — 99396 PREV VISIT EST AGE 40-64: CPT | Mod: 25

## 2019-12-09 PROCEDURE — 94010 BREATHING CAPACITY TEST: CPT

## 2019-12-09 NOTE — ASSESSMENT
[FreeTextEntry1] : Chronic RHinitis - will check RAST for allergy triggers \par  patient would like to schedule sinus surgery as she gets only partial response with med treatment with has significant Sx \par poor sleep - refer to PSG - schedule for tomorrow \par \par \par  GERD - cont with PPI \par  weight loss advised \par \par Hx of skin cancer - patient followed by her Derm regularly at [facility]

## 2019-12-09 NOTE — HISTORY OF PRESENT ILLNESS
[FreeTextEntry1] : pt c/o on going nasal congestion \par sinus pressure \par  inability to sleep through nose at night \par poor sleep \par  taking Ambien for years\par \par She reported that has have been having ongoing Nasal /sinus congestion, watery eyes, acid reflux “ since  09 11 2001”. \par She brought a note from [facility] stating that she was diagnosed with GERD on 03 25 2002 And Sinusitis on 1 11 2005. \par She is currently treated with Protonix for GERD \par EGD 2012  and 2014\par Patient recent ENT evolution 02 22 2019 indicated that \par “ Patient has had issues with nasal issues and recurrent sinus infections. She has had about 7 sinus infections in \par the last year and has to be treated with antibiotics each time. The last round of antibiotics was 1 month ago. “\par \par CT shows minor inflammation in paranasal sinuses sinus, and confirms the severely deviated nasal septum. 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 \par \par

## 2019-12-09 NOTE — REASON FOR VISIT
[Follow-Up] : a follow-up visit [FreeTextEntry1] : GERD, Chronic Rhinitis / sinusitis , skin CA- certified by [facility] as WTC related

## 2019-12-09 NOTE — HISTORY OF PRESENT ILLNESS
[FreeTextEntry1] : pt c/o on going nasal congestion \par sinus pressure \par  inability to sleep through nose at night \par poor sleep \par  taking Ambien for years\par \par She reported that has have been having ongoing Nasal /sinus congestion, watery eyes, acid reflux “ since  09 11 2001”. \par She brought a note from [facility] stating that she was diagnosed with GERD on 03 25 2002 And Sinusitis on 1 11 2005. \par She is currently treated with Protonix for GERD \par EGD 2012  and 2014\par Patient recent ENT evolution 02 22 2019 indicated that \par “ Patient has had issues with nasal issues and recurrent sinus infections. She has had about 7 sinus infections in \par the last year and has to be treated with antibiotics each time. The last round of antibiotics was 1 month ago. “\par \par CT shows minor inflammation in paranasal sinuses sinus, and confirms the severely deviated nasal septum. 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 \par \par

## 2019-12-09 NOTE — ASSESSMENT
[FreeTextEntry1] : Chronic RHinitis - will check RAST for allergy triggers \par  patient would like to schedule sinus surgery as she gets only partial response with med treatment with has significant Sx \par poor sleep - refer to PSG - schedule for tomorrow \par \par \par  GERD - cont with PPI \par  weight loss advised \par \par Hx of skin cancer - patient followed by her Derm regularly at [facility]

## 2019-12-09 NOTE — PHYSICAL EXAM
[General Appearance - Alert] : alert [General Appearance - In No Acute Distress] : in no acute distress [Outer Ear] : the ears and nose were normal in appearance [Oropharynx] : the oropharynx was normal [Neck Appearance] : the appearance of the neck was normal [Neck Cervical Mass (___cm)] : no neck mass was observed [Jugular Venous Distention Increased] : there was no jugular-venous distention [Thyroid Diffuse Enlargement] : the thyroid was not enlarged [Thyroid Nodule] : there were no palpable thyroid nodules [Auscultation Breath Sounds / Voice Sounds] : lungs were clear to auscultation bilaterally [Heart Rate And Rhythm] : heart rate was normal and rhythm regular [Heart Sounds] : normal S1 and S2 [Heart Sounds Gallop] : no gallops [Murmurs] : no murmurs [Heart Sounds Pericardial Friction Rub] : no pericardial rub [Bowel Sounds] : normal bowel sounds [Abdomen Soft] : soft [Abdomen Tenderness] : non-tender [] : no hepato-splenomegaly [Abdomen Mass (___ Cm)] : no abdominal mass palpated

## 2019-12-09 NOTE — PAST MEDICAL HISTORY
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

## 2019-12-09 NOTE — DISCUSSION/SUMMARY
[Patient seen for WTC Monitoring ___] : Patient was seen for WTC monitoring [unfilled] [Please See Note in Chart and Documentation in Trial DB] : Please see note in chart and documentation in Trial DB. 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\par \par 	 Dropping off supplies at [employer]\par  Perimeter security	 	\par  [employer]\par \par Allergy PCN and Sulfa\par \par Nasal /sinus congestion, watery eyes, had skin allergy testing, since 09 11 2001 \par  Voice changes, gets raspy \par IN 2012 ENT evaluated her – GERD \par Constipation/diarrhea, IBS since 9 11 2001\par  has occ cough chest tightness since 2008 \par  was diagnosed with asthma\par Meds updated in Allscript  \par PMH: Four Shoulder repairs Sx, 6 anchors in r shoulder, Lumbo-sacral disk herniation treated with cortisone inj, Cervical disk herniation, Broken pelvis, pain in hips down the lower leg\par  Melanoma 2014 excision \par Cholecystectomy MOHS\par \par  Colonoscopy reported 10 years ago \par PE in trial DB \par Spirometry NL ,\par CXR- 2018\par  declined  flu vaccine \par a/p WTC MV\par \par see  Occ EM note for detail

## 2019-12-09 NOTE — DISCUSSION/SUMMARY
[Patient seen for WTC Monitoring ___] : Patient was seen for WTC monitoring [unfilled] [Please See Note in Chart and Documentation in Trial DB] : Please see note in chart and documentation in Trial DB. [FreeTextEntry3] : Retired [employer]\par  Directly in the cloud of dust (or blackout) from the collapse of the [facility] buildings\par  Sep--Jun- 12 hours per day , 5 days a week on average \par  	              Sunday Monday Tuesday Wednesday	Thursday	Friday	Saturday\par  Sept 9- Sept 15	 	 	 15	 12	 12	 12	 12\par  Sept 16 - Sept 22	 12	 12	 12	 12	 12	 12	 12\par  Sept 23 - Sept 29	 12	 12	 12	 12	 12	 12	 12\par  Sept 30 12	 	 	 	 	 	 \par  On-site search and rescue\par  Morgue Work\par 	 On the pile/in the pit	 Identification\par \par 	 Dropping off supplies at [employer]\par  Perimeter security	 	\par  [employer]quarter Security\par \par Allergy PCN and Sulfa\par \par Nasal /sinus congestion, watery eyes, had skin allergy testing, since 09 11 2001 \par  Voice changes, gets raspy \par IN 2012 ENT evaluated her – GERD \par Constipation/diarrhea, IBS since 9 11 2001\par  has occ cough chest tightness since 2008 \par  was diagnosed with asthma\par Meds updated in Allscript  \par PMH: Four Shoulder repairs Sx, 6 anchors in r shoulder, Lumbo-sacral disk herniation treated with cortisone inj, Cervical disk herniation, Broken pelvis, pain in hips down the lower leg\par  Melanoma 2014 excision \par Cholecystectomy MOHS\par \par  Colonoscopy reported 10 years ago \par PE in trial DB \par Spirometry NL ,\par CXR- 2018\par  declined  flu vaccine \par a/p WTC MV\par \par see  Occ EM note for detail

## 2019-12-10 ENCOUNTER — OUTPATIENT (OUTPATIENT)
Dept: OUTPATIENT SERVICES | Facility: HOSPITAL | Age: 54
LOS: 1 days | End: 2019-12-10
Payer: COMMERCIAL

## 2019-12-10 ENCOUNTER — APPOINTMENT (OUTPATIENT)
Dept: SLEEP CENTER | Facility: CLINIC | Age: 54
End: 2019-12-10
Payer: COMMERCIAL

## 2019-12-10 DIAGNOSIS — Z98.890 OTHER SPECIFIED POSTPROCEDURAL STATES: Chronic | ICD-10-CM

## 2019-12-10 DIAGNOSIS — Z90.49 ACQUIRED ABSENCE OF OTHER SPECIFIED PARTS OF DIGESTIVE TRACT: Chronic | ICD-10-CM

## 2019-12-10 LAB
ALBUMIN SERPL ELPH-MCNC: 4.2 G/DL
ALP BLD-CCNC: 36 U/L
ALT SERPL-CCNC: 10 U/L
ANION GAP SERPL CALC-SCNC: 14 MMOL/L
APPEARANCE: ABNORMAL
AST SERPL-CCNC: 15 U/L
BACTERIA: ABNORMAL
BASOPHILS # BLD AUTO: 0.04 K/UL
BASOPHILS NFR BLD AUTO: 0.6 %
BILIRUB SERPL-MCNC: 0.6 MG/DL
BILIRUBIN URINE: NEGATIVE
BLOOD URINE: NEGATIVE
BUN SERPL-MCNC: 10 MG/DL
CALCIUM SERPL-MCNC: 9.2 MG/DL
CHLORIDE SERPL-SCNC: 103 MMOL/L
CHOLEST SERPL-MCNC: 180 MG/DL
CHOLEST/HDLC SERPL: 3 RATIO
CO2 SERPL-SCNC: 24 MMOL/L
COLOR: YELLOW
CREAT SERPL-MCNC: 0.62 MG/DL
EOSINOPHIL # BLD AUTO: 0.2 K/UL
EOSINOPHIL NFR BLD AUTO: 3.2 %
GLUCOSE QUALITATIVE U: NEGATIVE
GLUCOSE SERPL-MCNC: 65 MG/DL
HCT VFR BLD CALC: 36.8 %
HDLC SERPL-MCNC: 61 MG/DL
HGB BLD-MCNC: 11.8 G/DL
HYALINE CASTS: 3 /LPF
IMM GRANULOCYTES NFR BLD AUTO: 0.2 %
KETONES URINE: NEGATIVE
LDLC SERPL CALC-MCNC: 92 MG/DL
LEUKOCYTE ESTERASE URINE: ABNORMAL
LYMPHOCYTES # BLD AUTO: 2.05 K/UL
LYMPHOCYTES NFR BLD AUTO: 32.7 %
MAN DIFF?: NORMAL
MCHC RBC-ENTMCNC: 29.8 PG
MCHC RBC-ENTMCNC: 32.1 GM/DL
MCV RBC AUTO: 92.9 FL
MICROSCOPIC-UA: NORMAL
MONOCYTES # BLD AUTO: 0.46 K/UL
MONOCYTES NFR BLD AUTO: 7.3 %
NEUTROPHILS # BLD AUTO: 3.51 K/UL
NEUTROPHILS NFR BLD AUTO: 56 %
NITRITE URINE: NEGATIVE
PH URINE: 6.5
PLATELET # BLD AUTO: 323 K/UL
POTASSIUM SERPL-SCNC: 4.2 MMOL/L
PROT SERPL-MCNC: 6.4 G/DL
PROTEIN URINE: NORMAL
RBC # BLD: 3.96 M/UL
RBC # FLD: 12.3 %
RED BLOOD CELLS URINE: 5 /HPF
SODIUM SERPL-SCNC: 141 MMOL/L
SPECIFIC GRAVITY URINE: 1.02
SQUAMOUS EPITHELIAL CELLS: 20 /HPF
TRIGL SERPL-MCNC: 136 MG/DL
UROBILINOGEN URINE: NORMAL
WBC # FLD AUTO: 6.27 K/UL
WHITE BLOOD CELLS URINE: 16 /HPF

## 2019-12-10 PROCEDURE — 95810 POLYSOM 6/> YRS 4/> PARAM: CPT | Mod: 26

## 2019-12-10 PROCEDURE — 95810 POLYSOM 6/> YRS 4/> PARAM: CPT

## 2019-12-11 DIAGNOSIS — Z03.89 ENCOUNTER FOR OBSERVATION FOR OTHER SUSPECTED DISEASES AND CONDITIONS RULED OUT: ICD-10-CM

## 2019-12-13 LAB
A ALTERNATA IGE QN: 1.75 KUA/L
A FUMIGATUS IGE QN: <0.1 KUA/L
BERMUDA GRASS IGE QN: <0.1 KUA/L
BOXELDER IGE QN: <0.1 KUA/L
C HERBARUM IGE QN: <0.1 KUA/L
CALIF WALNUT IGE QN: <0.1 KUA/L
CAT DANDER IGE QN: 0.35 KUA/L
CMN PIGWEED IGE QN: <0.1 KUA/L
COMMON RAGWEED IGE QN: 0.4 KUA/L
COTTONWOOD IGE QN: <0.1 KUA/L
D FARINAE IGE QN: 1.02 KUA/L
D PTERONYSS IGE QN: 0.99 KUA/L
DEPRECATED A ALTERNATA IGE RAST QL: 2
DEPRECATED A FUMIGATUS IGE RAST QL: 0
DEPRECATED BERMUDA GRASS IGE RAST QL: 0
DEPRECATED BOXELDER IGE RAST QL: 0
DEPRECATED C HERBARUM IGE RAST QL: 0
DEPRECATED CAT DANDER IGE RAST QL: 1
DEPRECATED COMMON PIGWEED IGE RAST QL: 0
DEPRECATED COMMON RAGWEED IGE RAST QL: 1
DEPRECATED COTTONWOOD IGE RAST QL: 0
DEPRECATED D FARINAE IGE RAST QL: 2
DEPRECATED D PTERONYSS IGE RAST QL: 2
DEPRECATED DOG DANDER IGE RAST QL: 0
DEPRECATED GOOSEFOOT IGE RAST QL: 0
DEPRECATED LONDON PLANE IGE RAST QL: 0
DEPRECATED MUGWORT IGE RAST QL: 0
DEPRECATED P NOTATUM IGE RAST QL: 0
DEPRECATED RED CEDAR IGE RAST QL: 0
DEPRECATED ROACH IGE RAST QL: 0
DEPRECATED SHEEP SORREL IGE RAST QL: 0
DEPRECATED SILVER BIRCH IGE RAST QL: 2
DEPRECATED TIMOTHY IGE RAST QL: 0
DEPRECATED WHITE ASH IGE RAST QL: 0
DEPRECATED WHITE OAK IGE RAST QL: NORMAL
DOG DANDER IGE QN: <0.1 KUA/L
GOOSEFOOT IGE QN: <0.1 KUA/L
LONDON PLANE IGE QN: <0.1 KUA/L
MUGWORT IGE QN: <0.1 KUA/L
MULBERRY (T70) CLASS: 0
MULBERRY (T70) CONC: <0.1 KUA/L
P NOTATUM IGE QN: <0.1 KUA/L
RED CEDAR IGE QN: <0.1 KUA/L
ROACH IGE QN: <0.1 KUA/L
SHEEP SORREL IGE QN: <0.1 KUA/L
SILVER BIRCH IGE QN: 0.83 KUA/L
TIMOTHY IGE QN: <0.1 KUA/L
TREE ALLERG MIX1 IGE QL: 0
WHITE ASH IGE QN: <0.1 KUA/L
WHITE ELM IGE QN: 0
WHITE ELM IGE QN: <0.1 KUA/L
WHITE OAK IGE QN: 0.17 KUA/L

## 2020-01-20 ENCOUNTER — APPOINTMENT (OUTPATIENT)
Dept: GASTROENTEROLOGY | Facility: CLINIC | Age: 55
End: 2020-01-20

## 2020-04-10 ENCOUNTER — TRANSCRIPTION ENCOUNTER (OUTPATIENT)
Age: 55
End: 2020-04-10

## 2020-11-09 ENCOUNTER — RX RENEWAL (OUTPATIENT)
Age: 55
End: 2020-11-09

## 2021-02-04 ENCOUNTER — APPOINTMENT (OUTPATIENT)
Dept: OTHER | Facility: CLINIC | Age: 56
End: 2021-02-04

## 2021-04-18 NOTE — DISCHARGE NOTE PROVIDER - CARE PROVIDER_API CALL
NIEVES LABOY  74y, Male  Allergy: No Known Allergies      OVERNIGHT EVENTS: none    PAST MEDICAL & SURGICAL HISTORY:  Parkinson disease    Hypertension    Gout    Dyslipidemia    Prostatitis    Cataract    No significant past surgical history        VITALS:  T(F): 98.1 (04-18-21 @ 13:28), Max: 98.3 (04-18-21 @ 05:52)  HR: 71 (04-18-21 @ 13:28)  BP: 135/56 (04-18-21 @ 13:28) (121/63 - 137/60)  RR: 18 (04-18-21 @ 13:28)  SpO2: 97% (04-18-21 @ 05:52)    TESTS & MEASUREMENTS:      04-17-21 @ 07:01  -  04-18-21 @ 07:00  --------------------------------------------------------  IN: 1080 mL / OUT: 200 mL / NET: 880 mL    04-18-21 @ 07:01  -  04-18-21 @ 16:48  --------------------------------------------------------  IN: 1140 mL / OUT: 150 mL / NET: 990 mL    PHYSICAL EXAM:      Constitutional: awake, mumbling, cannot follow commands.      Respiratory:  mostly CTA b/l.  few rhochi scattered.    Cardiovascular: normal S1S2, not tachy.    Gastrointestinal: + BS, soft and NT.    Genitourinary: + condom cath with clear urine.    Extremities: NT, no redness.                                8.1    3.37  )-----------( 153      ( 18 Apr 2021 07:21 )             25.8     PT/INR - ( 17 Apr 2021 07:02 )   PT: 13.70 sec;   INR: 1.19 ratio         PTT - ( 17 Apr 2021 07:02 )  PTT:40.0 sec  04-18    138  |  106  |  13  ----------------------------<  83  4.3   |  21  |  0.6<L>    Ca    8.1<L>      18 Apr 2021 07:21    TPro  6.1  /  Alb  2.1<L>  /  TBili  0.4  /  DBili  x   /  AST  20  /  ALT  8   /  AlkPhos  136<H>  04-18    LIVER FUNCTIONS - ( 18 Apr 2021 07:21 )  Alb: 2.1 g/dL / Pro: 6.1 g/dL / ALK PHOS: 136 U/L / ALT: 8 U/L / AST: 20 U/L / GGT: x           Culture - Blood (collected 04-13-21 @ 07:34)  Source: .Blood None  Preliminary Report (04-14-21 @ 17:01):    No growth to date.    Culture - Blood (collected 04-12-21 @ 21:38)  Source: .Blood None  Final Report (04-18-21 @ 05:00):    No Growth Final    Culture - Blood (collected 04-12-21 @ 16:00)  Source: .Blood Blood  Final Report (04-17-21 @ 22:01):    No Growth Final      MEDICATIONS:  MEDICATIONS  (STANDING):  atorvastatin 40 milliGRAM(s) Oral at bedtime  carbidopa/levodopa  25/100 2 Tablet(s) Oral <User Schedule>  chlorhexidine 4% Liquid 1 Application(s) Topical <User Schedule>  collagenase Ointment 1 Application(s) Topical two times a day  Dakins Solution - 1/2 Strength 1 Application(s) Topical two times a day  dextrose 5% + sodium chloride 0.9%. 1000 milliLiter(s) (60 mL/Hr) IV Continuous <Continuous>  midodrine 10 milliGRAM(s) Oral every 8 hours  senna 2 Tablet(s) Oral at bedtime    MEDICATIONS  (PRN):  acetaminophen   Tablet .. 650 milliGRAM(s) Oral every 6 hours PRN Temp greater or equal to 38C (100.4F)       Servando Robert (MD)  Plastic Surgery; Surgery of the Hand  [address] [place] [address]  Phone: [phone]  Fax: [phone]  Follow Up Time: 1 week

## 2021-06-28 NOTE — ASU PATIENT PROFILE, ADULT - SURGICAL SITE INCISION
Relevant Problems   No relevant active problems       Anesthetic History   No history of anesthetic complications            Review of Systems / Medical History  Patient summary reviewed, nursing notes reviewed and pertinent labs reviewed    Pulmonary  Within defined limits                 Neuro/Psych   Within defined limits           Cardiovascular  Within defined limits                Exercise tolerance: >4 METS     GI/Hepatic/Renal  Within defined limits              Endo/Other      Hypothyroidism  Cancer (thyroid)     Other Findings              Physical Exam    Airway  Mallampati: II  TM Distance: 4 - 6 cm  Neck ROM: normal range of motion   Mouth opening: Normal     Cardiovascular  Regular rate and rhythm,  S1 and S2 normal,  no murmur, click, rub, or gallop             Dental  No notable dental hx       Pulmonary  Breath sounds clear to auscultation               Abdominal  GI exam deferred       Other Findings            Anesthetic Plan    ASA: 2  Anesthesia type: general          Induction: Intravenous  Anesthetic plan and risks discussed with: Patient no

## 2021-08-04 ENCOUNTER — APPOINTMENT (OUTPATIENT)
Dept: OTHER | Facility: CLINIC | Age: 56
End: 2021-08-04
Payer: COMMERCIAL

## 2021-08-04 ENCOUNTER — NON-APPOINTMENT (OUTPATIENT)
Age: 56
End: 2021-08-04

## 2021-08-04 PROCEDURE — 99396 PREV VISIT EST AGE 40-64: CPT | Mod: 95

## 2021-08-04 PROCEDURE — 99443: CPT | Mod: 95

## 2021-08-04 NOTE — PAST MEDICAL HISTORY
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

## 2021-08-04 NOTE — HISTORY OF PRESENT ILLNESS
[Home] : at home, [unfilled] , at the time of the visit. [Other Location: e.g. Home (Enter Location, City,State)___] : at [unfilled] [Verbal consent obtained from patient] : the patient, [unfilled] [FreeTextEntry1] : pt c/o on going nasal congestion \par sinus pressure \par  inability to sleep through nose at night \par \par she was recommended surgical treatment but  delayed it due to pandemic \par she had COVID infection in march 2021 wit nasal Congestion \par She reported that has have been having ongoing Nasal /sinus congestion, watery eyes, acid reflux “ since  09 11 2001”. \par She brought a note from [facility] stating that she was diagnosed with GERD on 03 25 2002 And Sinusitis on 1 11 2005. \par \par \par Patient recent ENT evolution 02 22 2019 indicated that \par “ Patient has had issues with nasal issues and recurrent sinus infections. She has had about 7 sinus infections in \par the last year and has to be treated with antibiotics each time. The last round of antibiotics was 1 month ago. “\par \par CT shows minor inflammation in paranasal sinuses sinus, and confirms the severely deviated nasal septum. 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 \par she reported that she  is followed by her DERM every 2-3 months \par \par She is currently treated with Pepcid for GERD  as needed \par  finds it effective \par EGD 2012  and 2014

## 2021-08-04 NOTE — PAST MEDICAL HISTORY
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

## 2021-08-04 NOTE — ASSESSMENT
[FreeTextEntry1] : Chronic RHinitis - \par renewed loratadine \par  she still using Nasonex \par  patient would like to schedule sinus surgery as she gets only partial response with med treatment with has \par \par \par  GERD - cont with Pepcid \par \par \par Hx of skin cancer - patient followed by her Derm regularly at [facility]

## 2021-08-04 NOTE — HEALTH RISK ASSESSMENT
[Patient reported colonoscopy was normal] : Patient reported colonoscopy was normal [ColonoscopyDate] : 2020

## 2021-08-04 NOTE — HISTORY OF PRESENT ILLNESS
[Home] : at home, [unfilled] , at the time of the visit. [Other Location: e.g. Home (Enter Location, City,State)___] : at [unfilled] [Verbal consent obtained from patient] : the patient, [unfilled] [FreeTextEntry1] : pt c/o on going nasal congestion \par sinus pressure \par  inability to sleep through nose at night \par \par she was recommended surgical treatment but  delayed it due to pandemic \par she had COVID infection in march 2021 wit nasal Congestion \par She reported that has have been having ongoing Nasal /sinus congestion, watery eyes, acid reflux “ since  09 11 2001”. \par She brought a note from [facility] stating that she was diagnosed with GERD on 03 25 2002 And Sinusitis on 1 11 2005. \par \par \par Patient recent ENT evolution 02 22 2019 indicated that \par “ Patient has had issues with nasal issues and recurrent sinus infections. She has had about 7 sinus infections in \par the last year and has to be treated with antibiotics each time. The last round of antibiotics was 1 month ago. “\par \par CT shows minor inflammation in paranasal sinuses sinus, and confirms the severely deviated nasal septum. 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 \par she reported that she  is followed by her DERM every 2-3 months \par \par She is currently treated with Pepcid for GERD  as needed \par  finds it effective \par EGD 2012  and 2014

## 2021-08-04 NOTE — DISCUSSION/SUMMARY
[Home] : at home, [unfilled] , at the time of the visit. [Other Location: e.g. Home (Enter Location, City,State)___] : at [unfilled] [Verbal consent obtained from patient] : the patient, [unfilled] [Patient seen for WTC Monitoring ___] : Patient was seen for WTC monitoring [unfilled] [Please See Note in Chart and Documentation in Trial DB] : Please see note in chart and documentation in Trial DB. [FreeTextEntry3] : Retired [employer]\par  Directly in the cloud of dust (or blackout) from the collapse of the [facility] buildings\par  Sep--Jun- 12 hours per day , 5 days a week on average \par  	              Sunday Monday Tuesday Wednesday	Thursday	Friday	Saturday\par  Sept 9- Sept 15	 	 	 15	 12	 12	 12	 12\par  Sept 16 - Sept 22	 12	 12	 12	 12	 12	 12	 12\par  Sept 23 - Sept 29	 12	 12	 12	 12	 12	 12	 12\par  Sept 30 12	 	 	 	 	 	 \par  On-site search and rescue\par  Morgue Work\par 	 On the pile/in the pit	 Identification\par \par 	 Dropping off supplies at [employer]\par  Perimeter security	 	\par  [employer]\par \par Allergy PCN and Sulfa\par \par Nasal /sinus congestion, watery eyes, had skin allergy testing, since 09 11 2001 \par  Voice changes, gets raspy \par IN 2012 ENT evaluated her – GERD \par Constipation/diarrhea, IBS since 9 11 2001\par  has occ cough chest tightness since 2008 \par  was diagnosed with asthma\par Meds updated in Allscript  \par PMH: Four Shoulder repairs Sx, 6 anchors in r shoulder, Lumbo-sacral disk herniation treated with cortisone inj, Cervical disk herniation, Broken pelvis, pain in hips down the lower leg\par  Melanoma 2014 excision \par Cholecystectomy\par  MOHS\par \par PE deferred \par CXR- 2018\par had covid vaccine in may 2021 \par a/p WTC MV\par \par see  Occ EM note for detail

## 2021-08-04 NOTE — ASSESSMENT
[FreeTextEntry1] : Chronic RHinitis - \par renewed loratadine \par  she still using Nasonex \par  patient would like to schedule sinus surgery as she gets only partial response with med treatment with has \par \par \par  GERD - cont with Pepcid \par \par \par Hx of skin cancer - patient followed by her Derm regularly at [facility]

## 2021-08-04 NOTE — DISCUSSION/SUMMARY
[Home] : at home, [unfilled] , at the time of the visit. [Other Location: e.g. Home (Enter Location, City,State)___] : at [unfilled] [Verbal consent obtained from patient] : the patient, [unfilled] [Patient seen for WTC Monitoring ___] : Patient was seen for WTC monitoring [unfilled] [Please See Note in Chart and Documentation in Trial DB] : Please see note in chart and documentation in Trial DB. [FreeTextEntry3] : Retired [employer]\par  Directly in the cloud of dust (or blackout) from the collapse of the [facility] buildings\par  Sep--Jun- 12 hours per day , 5 days a week on average \par  	              Sunday Monday Tuesday Wednesday	Thursday	Friday	Saturday\par  Sept 9- Sept 15	 	 	 15	 12	 12	 12	 12\par  Sept 16 - Sept 22	 12	 12	 12	 12	 12	 12	 12\par  Sept 23 - Sept 29	 12	 12	 12	 12	 12	 12	 12\par  Sept 30 12	 	 	 	 	 	 \par  On-site search and rescue\par  Morgue Work\par 	 On the pile/in the pit	 Identification\par \par 	 Dropping off supplies at [employer]\par  Perimeter security	 	\par  [employer]\par \par Allergy PCN and Sulfa\par \par Nasal /sinus congestion, watery eyes, had skin allergy testing, since 09 11 2001 \par  Voice changes, gets raspy \par IN 2012 ENT evaluated her – GERD \par Constipation/diarrhea, IBS since 9 11 2001\par  has occ cough chest tightness since 2008 \par  was diagnosed with asthma\par Meds updated in Allscript  \par PMH: Four Shoulder repairs Sx, 6 anchors in r shoulder, Lumbo-sacral disk herniation treated with cortisone inj, Cervical disk herniation, Broken pelvis, pain in hips down the lower leg\par  Melanoma 2014 excision \par Cholecystectomy\par  MOHS\par \par PE deferred \par CXR- 2018\par had covid vaccine in may 2021 \par a/p WTC MV\par \par see  Occ EM note for detail

## 2021-09-14 RX ORDER — ALBUTEROL SULFATE 90 UG/1
108 (90 BASE) INHALANT RESPIRATORY (INHALATION)
Qty: 1 | Refills: 0 | Status: ACTIVE | COMMUNITY
Start: 2021-09-14 | End: 1900-01-01

## 2021-09-16 ENCOUNTER — NON-APPOINTMENT (OUTPATIENT)
Age: 56
End: 2021-09-16

## 2021-09-17 ENCOUNTER — NON-APPOINTMENT (OUTPATIENT)
Age: 56
End: 2021-09-17

## 2021-09-17 RX ORDER — FLUTICASONE PROPIONATE AND SALMETEROL 250; 50 UG/1; UG/1
250-50 POWDER RESPIRATORY (INHALATION)
Qty: 1 | Refills: 1 | Status: ACTIVE | COMMUNITY
Start: 2021-09-17 | End: 1900-01-01

## 2021-09-20 ENCOUNTER — NON-APPOINTMENT (OUTPATIENT)
Age: 56
End: 2021-09-20

## 2021-09-20 ENCOUNTER — TRANSCRIPTION ENCOUNTER (OUTPATIENT)
Age: 56
End: 2021-09-20

## 2021-11-09 ENCOUNTER — RX RENEWAL (OUTPATIENT)
Age: 56
End: 2021-11-09

## 2021-12-10 ENCOUNTER — NON-APPOINTMENT (OUTPATIENT)
Age: 56
End: 2021-12-10

## 2021-12-17 ENCOUNTER — NON-APPOINTMENT (OUTPATIENT)
Age: 56
End: 2021-12-17

## 2022-04-01 ENCOUNTER — TRANSCRIPTION ENCOUNTER (OUTPATIENT)
Age: 57
End: 2022-04-01

## 2022-04-01 ENCOUNTER — NON-APPOINTMENT (OUTPATIENT)
Age: 57
End: 2022-04-01

## 2022-04-18 ENCOUNTER — NON-APPOINTMENT (OUTPATIENT)
Age: 57
End: 2022-04-18

## 2022-04-19 ENCOUNTER — NON-APPOINTMENT (OUTPATIENT)
Age: 57
End: 2022-04-19

## 2022-04-20 ENCOUNTER — NON-APPOINTMENT (OUTPATIENT)
Age: 57
End: 2022-04-20

## 2022-04-21 ENCOUNTER — NON-APPOINTMENT (OUTPATIENT)
Age: 57
End: 2022-04-21

## 2022-04-21 ENCOUNTER — FORM ENCOUNTER (OUTPATIENT)
Age: 57
End: 2022-04-21

## 2022-04-28 ENCOUNTER — NON-APPOINTMENT (OUTPATIENT)
Age: 57
End: 2022-04-28

## 2022-09-21 NOTE — BRIEF OPERATIVE NOTE - SPECIMENS
Diagnosis:   1. Ulcerative colitis with rectal bleeding, unspecified location (CMS/[facility])        Patient arrives for [facility] infusion today.     Dr. Ramirez is the ordering clinician, therapy plan orders reviewed and signed by clinician.     Vital Signs: BP: (!) 142/89 (pt. forgot to take bp meds this morning and will do so upon returning home)  Heart Rate: 76  Temp: 98.6 °F (37 °C)  Resp: 12  Weight: 80 kg (176 lb 5.9 oz)  Height: 5' 6\" (1.676 m)       Allergies:  ALLERGIES:  No Known Allergies      Labs reviewed with the patient: Yes    Nursing Summary:  Patient denies infections, open wounds, and any recent antibiotic use.     Patient condition stable during treatment? Yes  Questions were answered and understanding was verbalized. Yes   Education provided Yes    Patient advised on follow up, any and all questions answered.  Patient Discharged to home Ambulatory with self  
cultures

## 2022-10-31 ENCOUNTER — APPOINTMENT (OUTPATIENT)
Dept: OTHER | Facility: CLINIC | Age: 57
End: 2022-10-31

## 2022-10-31 VITALS
HEART RATE: 80 BPM | OXYGEN SATURATION: 96 % | TEMPERATURE: 98 F | SYSTOLIC BLOOD PRESSURE: 108 MMHG | DIASTOLIC BLOOD PRESSURE: 77 MMHG | RESPIRATION RATE: 18 BRPM | BODY MASS INDEX: 27.35 KG/M2 | HEIGHT: 70 IN | WEIGHT: 191 LBS

## 2022-10-31 DIAGNOSIS — Z12.9 ENCOUNTER FOR SCREENING FOR MALIGNANT NEOPLASM, SITE UNSPECIFIED: ICD-10-CM

## 2022-10-31 DIAGNOSIS — Z04.9 ENCOUNTER FOR EXAMINATION AND OBSERVATION FOR UNSPECIFIED REASON: ICD-10-CM

## 2022-10-31 PROCEDURE — 99396 PREV VISIT EST AGE 40-64: CPT | Mod: 25

## 2022-10-31 PROCEDURE — 94010 BREATHING CAPACITY TEST: CPT

## 2022-10-31 PROCEDURE — 99214 OFFICE O/P EST MOD 30 MIN: CPT | Mod: 25

## 2022-10-31 RX ORDER — OMEPRAZOLE 20 MG/1
20 CAPSULE, DELAYED RELEASE ORAL
Qty: 90 | Refills: 0 | Status: COMPLETED | COMMUNITY
Start: 2022-07-23

## 2022-10-31 RX ORDER — BROMPHENIRAMINE MALEATE, PSEUDOEPHEDRINE HYDROCHLORIDE, 2; 30; 10 MG/5ML; MG/5ML; MG/5ML
30-2-10 SYRUP ORAL
Qty: 200 | Refills: 0 | Status: COMPLETED | COMMUNITY
Start: 2022-05-02

## 2022-10-31 RX ORDER — AZELASTINE HYDROCHLORIDE 205.5 UG/1
0.15 SPRAY, METERED NASAL TWICE DAILY
Qty: 1 | Refills: 5 | Status: ACTIVE | COMMUNITY
Start: 2021-09-17 | End: 1900-01-01

## 2022-10-31 RX ORDER — ROSUVASTATIN CALCIUM 10 MG/1
10 TABLET, FILM COATED ORAL
Qty: 30 | Refills: 0 | Status: COMPLETED | COMMUNITY
Start: 2022-10-02

## 2022-10-31 RX ORDER — CEFDINIR 300 MG/1
300 CAPSULE ORAL
Qty: 20 | Refills: 0 | Status: COMPLETED | COMMUNITY
Start: 2022-08-23

## 2022-10-31 RX ORDER — DOXYCYCLINE HYCLATE 100 MG/1
100 CAPSULE ORAL
Qty: 14 | Refills: 0 | Status: COMPLETED | COMMUNITY
Start: 2022-06-07

## 2022-10-31 RX ORDER — LORATADINE 10 MG/1
10 TABLET ORAL
Qty: 30 | Refills: 3 | Status: ACTIVE | COMMUNITY
Start: 2020-11-09 | End: 1900-01-01

## 2022-10-31 RX ORDER — AZITHROMYCIN 250 MG/1
250 TABLET, FILM COATED ORAL
Qty: 6 | Refills: 0 | Status: COMPLETED | COMMUNITY
Start: 2022-07-14

## 2022-10-31 NOTE — ASSESSMENT
[FreeTextEntry1] : Chronic RHinitis - \par renewed loratadine \par  she still using Nasonex \par  patient would like to defer  sinus surgery even only partial response with med treatment \par \par \par  GERD - cont with Pepcid \par \par \par Hx of skin cancer - patient followed by her Derm regularly at [facility] \par \par  asthma - pt is on inhalers - advair and albuterol \par  she will forward me records when her Sx started to determine if eligible for cert as WTC related \par \par  pancreatic lipoma - pt is followed at [facility]

## 2022-10-31 NOTE — HISTORY OF PRESENT ILLNESS
[FreeTextEntry1] : \par pt stated that she was recently Dx with SCC R ear \par will have it removed \par \par she is seeing plastic surgeon today \par \par \par \par She was diagnosed with R cheek basal cell cancer 09 2018 \par  Had MOHS surgery \par BX 01 29 2013 R forearm skin lesion - atypical intraderm melanocytic proliferation\par partial nature of Bx was noted , findings were suspicious for early evolving melanoma in situ , re excision was advised \par 02 20 2013 R forearm skin lesion excision - focal atypical junctional melanocytes consistent with trailing edge of the previous biopsied lesion \par \par L shoulder BX 02 14 2014 - SK lesion. \par she reported that she  is followed by her DERM every 2-3 months Dr Chairez at [facility] dermatology \par \par \par She was  treated with Pepcid for GERD  as needed \par but as of few months ago she has worsening of chest burning and frequent heartburn during the day\par does admit drinking a lot of coffee\par she was switched to omeprazole by her GI Dr Garcia but still having a lot of heartburn \par \par \par EGD 2012  and 2014\par  \par  pt had pancreatitis  6 months ago \par \par MRI abd with MRCP \par 04 25 2022 \par small lipoma 1 cm head/neck pancreas, \par no cyst or cystic pancreatic lesions \par no main pancreatic ductal dilations \par she will follow up at [facility] \par \par  she had neck LN Bx 04 13 2022 that was done for lymphadenopathy and night sweats at [facility] \par \par her upper resp/sinus problems are controlled with azelastine nasal spray and nasal antihistamine \par

## 2022-10-31 NOTE — DISCUSSION/SUMMARY
[Patient seen for WTC Monitoring ___] : Patient was seen for WTC monitoring [unfilled] [Please See Note in Chart and Documentation in Trial DB] : Please see note in chart and documentation in Trial DB. 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\par \par 	 Dropping off supplies at [employer]\par  Perimeter security	 	\par  Headquarter Security\par \par Allergy PCN and Sulfa\par \par Nasal /sinus congestion, watery eyes, had skin allergy testing, since 09 11 2001 \par  Voice changes, gets raspy \par IN 2012 ENT evaluated her – GERD \par Constipation/diarrhea, IBS since 9 11 2001\par  has occ cough chest tightness since 2008 \par  was diagnosed with asthma\par Meds updated in All[employer]  \par PMH: Four Shoulder repairs Sx, 6 anchors in r shoulder, Lumbo-sacral disk herniation treated with cortisone inj, Cervical disk herniation, Broken pelvis, pain in hips down the lower leg\par PANCREATITIS \par  PANCREATIC LIPOMA \par MRI abd with MRCP \par 04 25 2022 \par small lipoma 1 cm head/neck pancreas, \par no cyst or cystic pancreatic lesions \par no main pancreatic ductal dilations \par \par  Melanoma 2014 excision \par Cholecystectomy\par  MOHS\par recent new dX OF scc r EAR- SHE WILL SENT ME REPORT \par PE in trial BDB \par CXR- 2018\par had covid vaccine in may 2021 \par  had COVID infection  in 08 2021 \par  SPIROMETRY nl \par a/p WTC MV\par \par see  Occ EM note for detail

## 2022-10-31 NOTE — HISTORY OF PRESENT ILLNESS
[FreeTextEntry1] : \par pt stated that she was recently Dx with SCC R ear \par will have it removed \par \par she is seeing plastic surgeon today \par \par \par \par She was diagnosed with R cheek basal cell cancer 09 2018 \par  Had MOHS surgery \par BX 01 29 2013 R forearm skin lesion - atypical intraderm melanocytic proliferation\par partial nature of Bx was noted , findings were suspicious for early evolving melanoma in situ , re excision was advised \par 02 20 2013 R forearm skin lesion excision - focal atypical junctional melanocytes consistent with trailing edge of the previous biopsied lesion \par \par L shoulder BX 02 14 2014 - SK lesion. \par she reported that she  is followed by her DERM every 2-3 months Dr Chairez at [facility] dermatology \par \par \par She was  treated with Pepcid for GERD  as needed \par but as of few months ago she has worsening of chest burning and frequent heartburn during the day\par does admit drinking a lot of coffee\par she was switched to omeprazole by her GI Dr Garcia but still having a lot of heartburn \par \par \par EGD 2012  and 2014\par  \par  pt had pancreatitis  6 months ago \par \par MRI abd with MRCP \par 04 25 2022 \par small lipoma 1 cm head/neck pancreas, \par no cyst or cystic pancreatic lesions \par no main pancreatic ductal dilations \par she will follow up at [facility] [place] [facility] \par \par  she had neck LN Bx 04 13 2022 that was done for lymphadenopathy and night sweats at [facility] \par \par her upper resp/sinus problems are controlled with azelastine nasal spray and nasal antihistamine \par

## 2022-10-31 NOTE — PAST MEDICAL HISTORY
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

## 2022-10-31 NOTE — ASSESSMENT
[FreeTextEntry1] : Chronic RHinitis - \par renewed loratadine \par  she still using Nasonex \par  patient would like to defer  sinus surgery even only partial response with med treatment \par \par \par  GERD - cont with Pepcid \par \par \par Hx of skin cancer - patient followed by her Derm regularly at [facility] \par \par  asthma - pt is on inhalers - advair and albuterol \par  she will forward me records when her Sx started to determine if eligible for cert as WTC related \par \par  pancreatic lipoma - pt is followed at [facility]

## 2022-10-31 NOTE — PAST MEDICAL HISTORY
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 buildings

## 2022-11-11 LAB
ALBUMIN SERPL ELPH-MCNC: 4.4 G/DL
ALP BLD-CCNC: 49 U/L
ALT SERPL-CCNC: 13 U/L
ANION GAP SERPL CALC-SCNC: 14 MMOL/L
APPEARANCE: CLEAR
AST SERPL-CCNC: 17 U/L
BACTERIA: NEGATIVE
BASOPHILS # BLD AUTO: 0.02 K/UL
BASOPHILS NFR BLD AUTO: 0.4 %
BILIRUB SERPL-MCNC: 0.6 MG/DL
BILIRUBIN URINE: NEGATIVE
BLOOD URINE: NEGATIVE
BUN SERPL-MCNC: 13 MG/DL
CALCIUM SERPL-MCNC: 9.3 MG/DL
CHLORIDE SERPL-SCNC: 103 MMOL/L
CHOLEST SERPL-MCNC: 205 MG/DL
CO2 SERPL-SCNC: 22 MMOL/L
COLOR: YELLOW
CREAT SERPL-MCNC: 0.59 MG/DL
EGFR: 106 ML/MIN/1.73M2
EOSINOPHIL # BLD AUTO: 0.16 K/UL
EOSINOPHIL NFR BLD AUTO: 2.9 %
GLUCOSE QUALITATIVE U: NEGATIVE
GLUCOSE SERPL-MCNC: 75 MG/DL
HCT VFR BLD CALC: 40.7 %
HDLC SERPL-MCNC: 56 MG/DL
HGB BLD-MCNC: 12.9 G/DL
HYALINE CASTS: 2 /LPF
IMM GRANULOCYTES NFR BLD AUTO: 0.2 %
KETONES URINE: NEGATIVE
LDLC SERPL CALC-MCNC: 120 MG/DL
LEUKOCYTE ESTERASE URINE: ABNORMAL
LYMPHOCYTES # BLD AUTO: 2.18 K/UL
LYMPHOCYTES NFR BLD AUTO: 38.9 %
MAN DIFF?: NORMAL
MCHC RBC-ENTMCNC: 30.3 PG
MCHC RBC-ENTMCNC: 31.7 GM/DL
MCV RBC AUTO: 95.5 FL
MICROSCOPIC-UA: NORMAL
MONOCYTES # BLD AUTO: 0.48 K/UL
MONOCYTES NFR BLD AUTO: 8.6 %
NEUTROPHILS # BLD AUTO: 2.76 K/UL
NEUTROPHILS NFR BLD AUTO: 49 %
NITRITE URINE: NEGATIVE
NONHDLC SERPL-MCNC: 149 MG/DL
PH URINE: 5.5
PLATELET # BLD AUTO: 308 K/UL
POTASSIUM SERPL-SCNC: 3.9 MMOL/L
PROT SERPL-MCNC: 6.7 G/DL
PROTEIN URINE: NORMAL
RBC # BLD: 4.26 M/UL
RBC # FLD: 12.5 %
RED BLOOD CELLS URINE: 8 /HPF
SODIUM SERPL-SCNC: 139 MMOL/L
SPECIFIC GRAVITY URINE: 1.02
SQUAMOUS EPITHELIAL CELLS: 1 /HPF
TRIGL SERPL-MCNC: 146 MG/DL
UROBILINOGEN URINE: NORMAL
WBC # FLD AUTO: 5.61 K/UL
WHITE BLOOD CELLS URINE: 1 /HPF

## 2022-11-15 ENCOUNTER — FORM ENCOUNTER (OUTPATIENT)
Age: 57
End: 2022-11-15

## 2022-11-16 ENCOUNTER — NON-APPOINTMENT (OUTPATIENT)
Age: 57
End: 2022-11-16

## 2022-11-17 ENCOUNTER — FORM ENCOUNTER (OUTPATIENT)
Age: 57
End: 2022-11-17

## 2022-12-01 ENCOUNTER — EMERGENCY (EMERGENCY)
Facility: HOSPITAL | Age: 57
LOS: 1 days | Discharge: HOME | End: 2022-12-01
Attending: EMERGENCY MEDICINE
Payer: COMMERCIAL

## 2022-12-01 VITALS
TEMPERATURE: 98 F | OXYGEN SATURATION: 100 % | HEIGHT: 70.5 IN | WEIGHT: 179.9 LBS | RESPIRATION RATE: 16 BRPM | HEART RATE: 79 BPM | SYSTOLIC BLOOD PRESSURE: 107 MMHG | DIASTOLIC BLOOD PRESSURE: 70 MMHG

## 2022-12-01 VITALS
HEART RATE: 66 BPM | SYSTOLIC BLOOD PRESSURE: 119 MMHG | TEMPERATURE: 98 F | DIASTOLIC BLOOD PRESSURE: 80 MMHG | RESPIRATION RATE: 16 BRPM | OXYGEN SATURATION: 96 %

## 2022-12-01 DIAGNOSIS — Z98.890 OTHER SPECIFIED POSTPROCEDURAL STATES: Chronic | ICD-10-CM

## 2022-12-01 DIAGNOSIS — Z90.49 ACQUIRED ABSENCE OF OTHER SPECIFIED PARTS OF DIGESTIVE TRACT: Chronic | ICD-10-CM

## 2022-12-01 LAB
ALBUMIN SERPL ELPH-MCNC: 4.8 G/DL — SIGNIFICANT CHANGE UP (ref 3.3–5)
ALP SERPL-CCNC: 51 U/L — SIGNIFICANT CHANGE UP (ref 40–120)
ALT FLD-CCNC: 19 U/L — SIGNIFICANT CHANGE UP (ref 10–45)
ANION GAP SERPL CALC-SCNC: 12 MMOL/L — SIGNIFICANT CHANGE UP (ref 5–17)
APPEARANCE UR: CLEAR — SIGNIFICANT CHANGE UP
AST SERPL-CCNC: 24 U/L — SIGNIFICANT CHANGE UP (ref 10–40)
BACTERIA # UR AUTO: NEGATIVE — SIGNIFICANT CHANGE UP
BASOPHILS # BLD AUTO: 0.04 K/UL — SIGNIFICANT CHANGE UP (ref 0–0.2)
BASOPHILS NFR BLD AUTO: 0.6 % — SIGNIFICANT CHANGE UP (ref 0–2)
BILIRUB SERPL-MCNC: 0.7 MG/DL — SIGNIFICANT CHANGE UP (ref 0.2–1.2)
BILIRUB UR-MCNC: NEGATIVE — SIGNIFICANT CHANGE UP
BUN SERPL-MCNC: 13 MG/DL — SIGNIFICANT CHANGE UP (ref 7–23)
CALCIUM SERPL-MCNC: 9.8 MG/DL — SIGNIFICANT CHANGE UP (ref 8.4–10.5)
CHLORIDE SERPL-SCNC: 106 MMOL/L — SIGNIFICANT CHANGE UP (ref 96–108)
CO2 SERPL-SCNC: 24 MMOL/L — SIGNIFICANT CHANGE UP (ref 22–31)
COLOR SPEC: YELLOW — SIGNIFICANT CHANGE UP
CREAT SERPL-MCNC: 0.58 MG/DL — SIGNIFICANT CHANGE UP (ref 0.5–1.3)
DIFF PNL FLD: ABNORMAL
EGFR: 107 ML/MIN/1.73M2 — SIGNIFICANT CHANGE UP
EOSINOPHIL # BLD AUTO: 0.1 K/UL — SIGNIFICANT CHANGE UP (ref 0–0.5)
EOSINOPHIL NFR BLD AUTO: 1.6 % — SIGNIFICANT CHANGE UP (ref 0–6)
EPI CELLS # UR: 3 /HPF — SIGNIFICANT CHANGE UP
GLUCOSE SERPL-MCNC: 82 MG/DL — SIGNIFICANT CHANGE UP (ref 70–99)
GLUCOSE UR QL: NEGATIVE — SIGNIFICANT CHANGE UP
HCT VFR BLD CALC: 43.9 % — SIGNIFICANT CHANGE UP (ref 34.5–45)
HGB BLD-MCNC: 14 G/DL — SIGNIFICANT CHANGE UP (ref 11.5–15.5)
HYALINE CASTS # UR AUTO: 2 /LPF — SIGNIFICANT CHANGE UP (ref 0–2)
IMM GRANULOCYTES NFR BLD AUTO: 0.2 % — SIGNIFICANT CHANGE UP (ref 0–0.9)
KETONES UR-MCNC: NEGATIVE — SIGNIFICANT CHANGE UP
LEUKOCYTE ESTERASE UR-ACNC: ABNORMAL
LIDOCAIN IGE QN: 33 U/L — SIGNIFICANT CHANGE UP (ref 7–60)
LYMPHOCYTES # BLD AUTO: 2.11 K/UL — SIGNIFICANT CHANGE UP (ref 1–3.3)
LYMPHOCYTES # BLD AUTO: 33 % — SIGNIFICANT CHANGE UP (ref 13–44)
MCHC RBC-ENTMCNC: 29.2 PG — SIGNIFICANT CHANGE UP (ref 27–34)
MCHC RBC-ENTMCNC: 31.9 GM/DL — LOW (ref 32–36)
MCV RBC AUTO: 91.5 FL — SIGNIFICANT CHANGE UP (ref 80–100)
MONOCYTES # BLD AUTO: 0.38 K/UL — SIGNIFICANT CHANGE UP (ref 0–0.9)
MONOCYTES NFR BLD AUTO: 5.9 % — SIGNIFICANT CHANGE UP (ref 2–14)
NEUTROPHILS # BLD AUTO: 3.75 K/UL — SIGNIFICANT CHANGE UP (ref 1.8–7.4)
NEUTROPHILS NFR BLD AUTO: 58.7 % — SIGNIFICANT CHANGE UP (ref 43–77)
NITRITE UR-MCNC: NEGATIVE — SIGNIFICANT CHANGE UP
NRBC # BLD: 0 /100 WBCS — SIGNIFICANT CHANGE UP (ref 0–0)
PH UR: 6 — SIGNIFICANT CHANGE UP (ref 5–8)
PLATELET # BLD AUTO: 312 K/UL — SIGNIFICANT CHANGE UP (ref 150–400)
POTASSIUM SERPL-MCNC: 4.7 MMOL/L — SIGNIFICANT CHANGE UP (ref 3.5–5.3)
POTASSIUM SERPL-SCNC: 4.7 MMOL/L — SIGNIFICANT CHANGE UP (ref 3.5–5.3)
PROT SERPL-MCNC: 7.6 G/DL — SIGNIFICANT CHANGE UP (ref 6–8.3)
PROT UR-MCNC: ABNORMAL
RBC # BLD: 4.8 M/UL — SIGNIFICANT CHANGE UP (ref 3.8–5.2)
RBC # FLD: 12.3 % — SIGNIFICANT CHANGE UP (ref 10.3–14.5)
RBC CASTS # UR COMP ASSIST: 6 /HPF — HIGH (ref 0–4)
SODIUM SERPL-SCNC: 142 MMOL/L — SIGNIFICANT CHANGE UP (ref 135–145)
SP GR SPEC: 1.03 — HIGH (ref 1.01–1.02)
UROBILINOGEN FLD QL: NEGATIVE — SIGNIFICANT CHANGE UP
WBC # BLD: 6.39 K/UL — SIGNIFICANT CHANGE UP (ref 3.8–10.5)
WBC # FLD AUTO: 6.39 K/UL — SIGNIFICANT CHANGE UP (ref 3.8–10.5)
WBC UR QL: 5 /HPF — SIGNIFICANT CHANGE UP (ref 0–5)

## 2022-12-01 PROCEDURE — 99284 EMERGENCY DEPT VISIT MOD MDM: CPT | Mod: 25

## 2022-12-01 PROCEDURE — 96374 THER/PROPH/DIAG INJ IV PUSH: CPT

## 2022-12-01 PROCEDURE — 80053 COMPREHEN METABOLIC PANEL: CPT

## 2022-12-01 PROCEDURE — 74176 CT ABD & PELVIS W/O CONTRAST: CPT | Mod: 26,MA

## 2022-12-01 PROCEDURE — 85025 COMPLETE CBC W/AUTO DIFF WBC: CPT

## 2022-12-01 PROCEDURE — 99285 EMERGENCY DEPT VISIT HI MDM: CPT

## 2022-12-01 PROCEDURE — 76775 US EXAM ABDO BACK WALL LIM: CPT

## 2022-12-01 PROCEDURE — 83690 ASSAY OF LIPASE: CPT

## 2022-12-01 PROCEDURE — 81001 URINALYSIS AUTO W/SCOPE: CPT

## 2022-12-01 PROCEDURE — 87086 URINE CULTURE/COLONY COUNT: CPT

## 2022-12-01 PROCEDURE — 76775 US EXAM ABDO BACK WALL LIM: CPT | Mod: 26

## 2022-12-01 PROCEDURE — 74176 CT ABD & PELVIS W/O CONTRAST: CPT | Mod: MA

## 2022-12-01 PROCEDURE — 96375 TX/PRO/DX INJ NEW DRUG ADDON: CPT

## 2022-12-01 RX ORDER — NITROFURANTOIN MACROCRYSTAL 50 MG
200 CAPSULE ORAL ONCE
Refills: 0 | Status: DISCONTINUED | OUTPATIENT
Start: 2022-12-01 | End: 2022-12-01

## 2022-12-01 RX ORDER — SODIUM CHLORIDE 9 MG/ML
1000 INJECTION INTRAMUSCULAR; INTRAVENOUS; SUBCUTANEOUS ONCE
Refills: 0 | Status: COMPLETED | OUTPATIENT
Start: 2022-12-01 | End: 2022-12-01

## 2022-12-01 RX ORDER — ACETAMINOPHEN 500 MG
975 TABLET ORAL ONCE
Refills: 0 | Status: COMPLETED | OUTPATIENT
Start: 2022-12-01 | End: 2022-12-01

## 2022-12-01 RX ORDER — ONDANSETRON 8 MG/1
4 TABLET, FILM COATED ORAL ONCE
Refills: 0 | Status: COMPLETED | OUTPATIENT
Start: 2022-12-01 | End: 2022-12-01

## 2022-12-01 RX ORDER — KETOROLAC TROMETHAMINE 30 MG/ML
15 SYRINGE (ML) INJECTION ONCE
Refills: 0 | Status: DISCONTINUED | OUTPATIENT
Start: 2022-12-01 | End: 2022-12-01

## 2022-12-01 RX ORDER — NITROFURANTOIN MACROCRYSTAL 50 MG
1 CAPSULE ORAL
Qty: 10 | Refills: 0
Start: 2022-12-01 | End: 2022-12-05

## 2022-12-01 RX ADMIN — ONDANSETRON 4 MILLIGRAM(S): 8 TABLET, FILM COATED ORAL at 15:18

## 2022-12-01 RX ADMIN — ONDANSETRON 4 MILLIGRAM(S): 8 TABLET, FILM COATED ORAL at 17:56

## 2022-12-01 RX ADMIN — Medication 15 MILLIGRAM(S): at 15:18

## 2022-12-01 RX ADMIN — Medication 975 MILLIGRAM(S): at 15:17

## 2022-12-01 RX ADMIN — SODIUM CHLORIDE 1000 MILLILITER(S): 9 INJECTION INTRAMUSCULAR; INTRAVENOUS; SUBCUTANEOUS at 15:18

## 2022-12-01 NOTE — ED ADULT NURSE NOTE - OBJECTIVE STATEMENT
57y female w/pmh of renal stones and pancreatitis presents to ED w/ right sided flank pain. Pt states pain started yesterday. Pt also endorses nausea and has felt the sensation to vomit. Pt describes the pain and nausea as intermittent but it is increasing in severity. Pt also states she felt chills this morning but no fever. Pt denies hematuria and endorses increased frequency of urination. Denies fever, chest pain, sob, vomiting, diarrhea. Pt is ambulatory.

## 2022-12-01 NOTE — ED PROVIDER NOTE - RAPID ASSESSMENT
57 F w/ Hx of renal stone, pancreatitis who p/w R flank pain since yesterday evening. Constant, but intermittent worsening with nausea.   No F/C, D/C, dysurina, hematuria.     *** I, Jero Ward MD, performed an initial face to face bedside interview with this patient regarding history of present illness and determined that the patient should be evaluated in the main ED. A physical exam was not performed due to private space availability. This patient's evaluation is NOT COMPLETE and only preliminary. The full assessment, management, and reassessment of this patient, including but not limited to the follow up of ordered laboratory and radiologic testing, is deferred to the main ED provider. ***

## 2022-12-01 NOTE — ED PROVIDER NOTE - NS ED ROS FT
Constitutional:  (-) fever, (-) chills,  Cardiac: (-) chest pain (-) palpitations  Respiratory:  (-) cough    GI:  (-) nausea (-) vomiting   :  (-) dysuria (+) oligouria   MS:  (-) back pain (-) joint pain.  Neuro:  (-) headache (-) numbness (-) tingling (-) focal weakness  Skin:  (-) rash  Except as documented in the HPI,  all other systems are negative

## 2022-12-01 NOTE — ED PROVIDER NOTE - NSFOLLOWUPCLINICS_GEN_ALL_ED_FT
[facility] - Urology  Urology  [address], [address] [place] [address]  Phone: [phone]  Fax:   Follow Up Time: 7-10 Days

## 2022-12-01 NOTE — ED PROVIDER NOTE - CLINICAL SUMMARY MEDICAL DECISION MAKING FREE TEXT BOX
jodie - pt 57 f  with sq cell cancer presents w acute rt flank pain , pt endoses no feer pos sever pain , pos nausea , pos urinary frequency no dysuria- no hematuria no prev stones on exam pos cvat no rash abd soft pocus to eval for hydro -- as 1st stone consider ct , analgesia and fluids and reeval 57-year-old woman history of kidney stones pancreatitis presenting with acute flank pain since yesterday.  concentrated over her left flank with radiation to the back.  No hematuria.  No fevers at home.  She is unsure if she had a history of this in the past with regards to kidney stones.  No family history of kidney stone.  Does have remote history of pancreatitis.  No periumbilical pain at this time.     57-year-old woman history of possible history of kidney stones and pancreatitis presenting with acute right flank pain.  Consider UTI versus Hipolito but less likely due to no systemic symptoms.  Acute onset as well.  could be kidney stone or hydronephrosis.  Will treat for pain nausea as needed.  Dedicated  renal ultrasound and then possible noncontrast CT.             jodie - pt 57 f  with sq cell cancer presents w acute rt flank pain , pt endoses no feer pos sever pain , pos nausea , pos urinary frequency no dysuria- no hematuria no prev stones on exam pos cvat no rash abd soft pocus to eval for hydro -- as 1st stone consider ct , analgesia and fluids and reeval

## 2022-12-01 NOTE — ED PROVIDER NOTE - OBJECTIVE STATEMENT
57-year-old woman history of kidney stones pancreatitis presenting with acute flank pain since yesterday.  concentrated over her left flank with radiation to the back.  No hematuria.  No fevers at home.  She is unsure if she had a history of this in the past with regards to kidney stones.  No family history of kidney stone.  Does have remote history of pancreatitis.  No periumbilical pain at this time.

## 2022-12-01 NOTE — ED PROVIDER NOTE - PATIENT PORTAL LINK FT
You can access the FollowMyHealth Patient Portal offered by [facility] by registering at the following website: [email or web address]. By joining [facility]’s FollowMyHealth portal, you will also be able to view your health information using other applications (apps) compatible with our system.

## 2022-12-01 NOTE — ED PROVIDER NOTE - PHYSICAL EXAMINATION
CONSTITUTIONAL: well-appearing, in NAD  SKIN: Warm dry, normal skin turgor  HEAD: NCAT  EYES: no scleral icterus, conjunctiva pink  ENT: normal pharynx with no erythema or exudates. Some R CVAT   NECK: Supple; non tender. Full ROM.  CARD: RRR, no murmurs.  RESP: clear to ausculation b/l. No crackles or wheezing.  ABD: soft, non-tender, non-distended, no rebound or guarding.  MSK: no pedal edema, no calf tenderness  NEURO: normal motor. normal sensory. CN grossly intact  II-XII intact Normal gait.  PSYCH: Cooperative, appropriate.

## 2022-12-02 LAB
CULTURE RESULTS: SIGNIFICANT CHANGE UP
SPECIMEN SOURCE: SIGNIFICANT CHANGE UP

## 2022-12-20 NOTE — H&P PST ADULT - HEMATOLOGY/LYMPHATICS
negative Bi-Rhombic Flap Text: The defect edges were debeveled with a #15 scalpel blade.  Given the location of the defect and the proximity to free margins a bi-rhombic flap was deemed most appropriate.  Using a sterile surgical marker, an appropriate rhombic flap was drawn incorporating the defect. The area thus outlined was incised deep to adipose tissue with a #15 scalpel blade.  The skin margins were undermined to an appropriate distance in all directions utilizing iris scissors.

## 2023-02-04 ENCOUNTER — NON-APPOINTMENT (OUTPATIENT)
Age: 58
End: 2023-02-04

## 2023-10-02 RX ORDER — LEVOFLOXACIN 5 MG/ML
1 INJECTION, SOLUTION INTRAVENOUS
Refills: 0 | DISCHARGE
Start: 2023-10-02 | End: 2023-10-07

## 2023-10-04 ENCOUNTER — NON-APPOINTMENT (OUTPATIENT)
Age: 58
End: 2023-10-04

## 2023-10-04 ENCOUNTER — APPOINTMENT (OUTPATIENT)
Dept: CARDIOLOGY | Facility: CLINIC | Age: 58
End: 2023-10-04

## 2023-10-04 ENCOUNTER — APPOINTMENT (OUTPATIENT)
Dept: CARDIOLOGY | Facility: CLINIC | Age: 58
End: 2023-10-04
Payer: COMMERCIAL

## 2023-10-04 ENCOUNTER — INPATIENT (INPATIENT)
Facility: HOSPITAL | Age: 58
LOS: 4 days | Discharge: HOME | DRG: 206 | End: 2023-10-09
Attending: STUDENT IN AN ORGANIZED HEALTH CARE EDUCATION/TRAINING PROGRAM | Admitting: STUDENT IN AN ORGANIZED HEALTH CARE EDUCATION/TRAINING PROGRAM
Payer: COMMERCIAL

## 2023-10-04 VITALS
BODY MASS INDEX: 25.05 KG/M2 | OXYGEN SATURATION: 95 % | HEIGHT: 70 IN | DIASTOLIC BLOOD PRESSURE: 78 MMHG | HEART RATE: 66 BPM | WEIGHT: 175 LBS | SYSTOLIC BLOOD PRESSURE: 117 MMHG

## 2023-10-04 VITALS
WEIGHT: 175.05 LBS | HEART RATE: 73 BPM | RESPIRATION RATE: 22 BRPM | SYSTOLIC BLOOD PRESSURE: 114 MMHG | OXYGEN SATURATION: 98 % | DIASTOLIC BLOOD PRESSURE: 82 MMHG | TEMPERATURE: 99 F | HEIGHT: 70 IN

## 2023-10-04 DIAGNOSIS — Z98.890 OTHER SPECIFIED POSTPROCEDURAL STATES: Chronic | ICD-10-CM

## 2023-10-04 DIAGNOSIS — I26.99 OTHER PULMONARY EMBOLISM WITHOUT ACUTE COR PULMONALE: ICD-10-CM

## 2023-10-04 DIAGNOSIS — R07.9 CHEST PAIN, UNSPECIFIED: ICD-10-CM

## 2023-10-04 DIAGNOSIS — J18.9 PNEUMONIA, UNSPECIFIED ORGANISM: ICD-10-CM

## 2023-10-04 DIAGNOSIS — R06.02 SHORTNESS OF BREATH: ICD-10-CM

## 2023-10-04 DIAGNOSIS — Z90.49 ACQUIRED ABSENCE OF OTHER SPECIFIED PARTS OF DIGESTIVE TRACT: Chronic | ICD-10-CM

## 2023-10-04 DIAGNOSIS — J45.909 UNSPECIFIED ASTHMA, UNCOMPLICATED: ICD-10-CM

## 2023-10-04 DIAGNOSIS — Z29.9 ENCOUNTER FOR PROPHYLACTIC MEASURES, UNSPECIFIED: ICD-10-CM

## 2023-10-04 LAB
ALBUMIN SERPL ELPH-MCNC: 4.6 G/DL — SIGNIFICANT CHANGE UP (ref 3.3–5)
ALP SERPL-CCNC: 47 U/L — SIGNIFICANT CHANGE UP (ref 40–120)
ALT FLD-CCNC: 20 U/L — SIGNIFICANT CHANGE UP (ref 10–45)
ANION GAP SERPL CALC-SCNC: 16 MMOL/L — SIGNIFICANT CHANGE UP (ref 5–17)
APPEARANCE UR: CLEAR — SIGNIFICANT CHANGE UP
AST SERPL-CCNC: 17 U/L — SIGNIFICANT CHANGE UP (ref 10–40)
BACTERIA # UR AUTO: NEGATIVE — SIGNIFICANT CHANGE UP
BASE EXCESS BLDV CALC-SCNC: -0.4 MMOL/L — SIGNIFICANT CHANGE UP (ref -2–3)
BASE EXCESS BLDV CALC-SCNC: 0.8 MMOL/L — SIGNIFICANT CHANGE UP (ref -2–3)
BASOPHILS # BLD AUTO: 0.02 K/UL — SIGNIFICANT CHANGE UP (ref 0–0.2)
BASOPHILS NFR BLD AUTO: 0.3 % — SIGNIFICANT CHANGE UP (ref 0–2)
BILIRUB SERPL-MCNC: 0.6 MG/DL — SIGNIFICANT CHANGE UP (ref 0.2–1.2)
BILIRUB UR-MCNC: NEGATIVE — SIGNIFICANT CHANGE UP
BUN SERPL-MCNC: 15 MG/DL — SIGNIFICANT CHANGE UP (ref 7–23)
CA-I SERPL-SCNC: 1.2 MMOL/L — SIGNIFICANT CHANGE UP (ref 1.15–1.33)
CA-I SERPL-SCNC: 1.21 MMOL/L — SIGNIFICANT CHANGE UP (ref 1.15–1.33)
CALCIUM SERPL-MCNC: 9.4 MG/DL — SIGNIFICANT CHANGE UP (ref 8.4–10.5)
CHLORIDE BLDV-SCNC: 105 MMOL/L — SIGNIFICANT CHANGE UP (ref 96–108)
CHLORIDE BLDV-SCNC: 105 MMOL/L — SIGNIFICANT CHANGE UP (ref 96–108)
CHLORIDE SERPL-SCNC: 103 MMOL/L — SIGNIFICANT CHANGE UP (ref 96–108)
CO2 BLDV-SCNC: 26 MMOL/L — SIGNIFICANT CHANGE UP (ref 22–26)
CO2 BLDV-SCNC: 29 MMOL/L — HIGH (ref 22–26)
CO2 SERPL-SCNC: 22 MMOL/L — SIGNIFICANT CHANGE UP (ref 22–31)
COLOR SPEC: COLORLESS — SIGNIFICANT CHANGE UP
CREAT SERPL-MCNC: 0.59 MG/DL — SIGNIFICANT CHANGE UP (ref 0.5–1.3)
D DIMER BLD IA.RAPID-MCNC: <150 NG/ML DDU — SIGNIFICANT CHANGE UP
DIFF PNL FLD: ABNORMAL
EGFR: 106 ML/MIN/1.73M2 — SIGNIFICANT CHANGE UP
EOSINOPHIL # BLD AUTO: 0.04 K/UL — SIGNIFICANT CHANGE UP (ref 0–0.5)
EOSINOPHIL NFR BLD AUTO: 0.6 % — SIGNIFICANT CHANGE UP (ref 0–6)
EPI CELLS # UR: 1 /HPF — SIGNIFICANT CHANGE UP
GAS PNL BLDV: 137 MMOL/L — SIGNIFICANT CHANGE UP (ref 136–145)
GAS PNL BLDV: 138 MMOL/L — SIGNIFICANT CHANGE UP (ref 136–145)
GAS PNL BLDV: SIGNIFICANT CHANGE UP
GLUCOSE BLDV-MCNC: 91 MG/DL — SIGNIFICANT CHANGE UP (ref 70–99)
GLUCOSE BLDV-MCNC: 99 MG/DL — SIGNIFICANT CHANGE UP (ref 70–99)
GLUCOSE SERPL-MCNC: 96 MG/DL — SIGNIFICANT CHANGE UP (ref 70–99)
GLUCOSE UR QL: NEGATIVE — SIGNIFICANT CHANGE UP
HCO3 BLDV-SCNC: 25 MMOL/L — SIGNIFICANT CHANGE UP (ref 22–29)
HCO3 BLDV-SCNC: 27 MMOL/L — SIGNIFICANT CHANGE UP (ref 22–29)
HCT VFR BLD CALC: 41.8 % — SIGNIFICANT CHANGE UP (ref 34.5–45)
HCT VFR BLDA CALC: 38 % — SIGNIFICANT CHANGE UP (ref 34.5–46.5)
HCT VFR BLDA CALC: 40 % — SIGNIFICANT CHANGE UP (ref 34.5–46.5)
HGB BLD CALC-MCNC: 12.6 G/DL — SIGNIFICANT CHANGE UP (ref 11.7–16.1)
HGB BLD CALC-MCNC: 13.4 G/DL — SIGNIFICANT CHANGE UP (ref 11.7–16.1)
HGB BLD-MCNC: 13.4 G/DL — SIGNIFICANT CHANGE UP (ref 11.5–15.5)
HYALINE CASTS # UR AUTO: 1 /LPF — SIGNIFICANT CHANGE UP (ref 0–2)
IMM GRANULOCYTES NFR BLD AUTO: 0.6 % — SIGNIFICANT CHANGE UP (ref 0–0.9)
KETONES UR-MCNC: NEGATIVE — SIGNIFICANT CHANGE UP
LACTATE BLDV-MCNC: 1.2 MMOL/L — SIGNIFICANT CHANGE UP (ref 0.5–2)
LACTATE BLDV-MCNC: 2.1 MMOL/L — HIGH (ref 0.5–2)
LEUKOCYTE ESTERASE UR-ACNC: NEGATIVE — SIGNIFICANT CHANGE UP
LYMPHOCYTES # BLD AUTO: 1.57 K/UL — SIGNIFICANT CHANGE UP (ref 1–3.3)
LYMPHOCYTES # BLD AUTO: 23.9 % — SIGNIFICANT CHANGE UP (ref 13–44)
MAGNESIUM SERPL-MCNC: 2.1 MG/DL — SIGNIFICANT CHANGE UP (ref 1.6–2.6)
MCHC RBC-ENTMCNC: 29.5 PG — SIGNIFICANT CHANGE UP (ref 27–34)
MCHC RBC-ENTMCNC: 32.1 GM/DL — SIGNIFICANT CHANGE UP (ref 32–36)
MCV RBC AUTO: 91.9 FL — SIGNIFICANT CHANGE UP (ref 80–100)
MONOCYTES # BLD AUTO: 0.26 K/UL — SIGNIFICANT CHANGE UP (ref 0–0.9)
MONOCYTES NFR BLD AUTO: 4 % — SIGNIFICANT CHANGE UP (ref 2–14)
NEUTROPHILS # BLD AUTO: 4.63 K/UL — SIGNIFICANT CHANGE UP (ref 1.8–7.4)
NEUTROPHILS NFR BLD AUTO: 70.6 % — SIGNIFICANT CHANGE UP (ref 43–77)
NITRITE UR-MCNC: NEGATIVE — SIGNIFICANT CHANGE UP
NRBC # BLD: 0 /100 WBCS — SIGNIFICANT CHANGE UP (ref 0–0)
NT-PROBNP SERPL-SCNC: <36 PG/ML — SIGNIFICANT CHANGE UP (ref 0–300)
PCO2 BLDV: 42 MMHG — SIGNIFICANT CHANGE UP (ref 39–42)
PCO2 BLDV: 49 MMHG — HIGH (ref 39–42)
PH BLDV: 7.35 — SIGNIFICANT CHANGE UP (ref 7.32–7.43)
PH BLDV: 7.38 — SIGNIFICANT CHANGE UP (ref 7.32–7.43)
PH UR: 6 — SIGNIFICANT CHANGE UP (ref 5–8)
PLATELET # BLD AUTO: 293 K/UL — SIGNIFICANT CHANGE UP (ref 150–400)
PO2 BLDV: 44 MMHG — SIGNIFICANT CHANGE UP (ref 25–45)
PO2 BLDV: 76 MMHG — HIGH (ref 25–45)
POTASSIUM BLDV-SCNC: 4 MMOL/L — SIGNIFICANT CHANGE UP (ref 3.5–5.1)
POTASSIUM BLDV-SCNC: 4.4 MMOL/L — SIGNIFICANT CHANGE UP (ref 3.5–5.1)
POTASSIUM SERPL-MCNC: 4.1 MMOL/L — SIGNIFICANT CHANGE UP (ref 3.5–5.3)
POTASSIUM SERPL-SCNC: 4.1 MMOL/L — SIGNIFICANT CHANGE UP (ref 3.5–5.3)
PROT SERPL-MCNC: 7.4 G/DL — SIGNIFICANT CHANGE UP (ref 6–8.3)
PROT UR-MCNC: NEGATIVE — SIGNIFICANT CHANGE UP
RAPID RVP RESULT: SIGNIFICANT CHANGE UP
RBC # BLD: 4.55 M/UL — SIGNIFICANT CHANGE UP (ref 3.8–5.2)
RBC # FLD: 12.7 % — SIGNIFICANT CHANGE UP (ref 10.3–14.5)
RBC CASTS # UR COMP ASSIST: 2 /HPF — SIGNIFICANT CHANGE UP (ref 0–4)
SAO2 % BLDV: 71.4 % — SIGNIFICANT CHANGE UP (ref 67–88)
SAO2 % BLDV: 96.1 % — HIGH (ref 67–88)
SARS-COV-2 RNA SPEC QL NAA+PROBE: SIGNIFICANT CHANGE UP
SODIUM SERPL-SCNC: 141 MMOL/L — SIGNIFICANT CHANGE UP (ref 135–145)
SP GR SPEC: 1.03 — HIGH (ref 1.01–1.02)
TROPONIN T, HIGH SENSITIVITY RESULT: <6 NG/L — SIGNIFICANT CHANGE UP (ref 0–51)
UROBILINOGEN FLD QL: NEGATIVE — SIGNIFICANT CHANGE UP
WBC # BLD: 6.56 K/UL — SIGNIFICANT CHANGE UP (ref 3.8–10.5)
WBC # FLD AUTO: 6.56 K/UL — SIGNIFICANT CHANGE UP (ref 3.8–10.5)
WBC UR QL: 0 /HPF — SIGNIFICANT CHANGE UP (ref 0–5)

## 2023-10-04 PROCEDURE — 71275 CT ANGIOGRAPHY CHEST: CPT | Mod: 26,MA

## 2023-10-04 PROCEDURE — 99223 1ST HOSP IP/OBS HIGH 75: CPT

## 2023-10-04 PROCEDURE — 99222 1ST HOSP IP/OBS MODERATE 55: CPT

## 2023-10-04 PROCEDURE — 99285 EMERGENCY DEPT VISIT HI MDM: CPT

## 2023-10-04 PROCEDURE — 93308 TTE F-UP OR LMTD: CPT | Mod: 26

## 2023-10-04 PROCEDURE — 93306 TTE W/DOPPLER COMPLETE: CPT | Mod: 26

## 2023-10-04 PROCEDURE — 99204 OFFICE O/P NEW MOD 45 MIN: CPT

## 2023-10-04 PROCEDURE — 71046 X-RAY EXAM CHEST 2 VIEWS: CPT | Mod: 26

## 2023-10-04 RX ORDER — BUDESONIDE AND FORMOTEROL FUMARATE DIHYDRATE 160; 4.5 UG/1; UG/1
2 AEROSOL RESPIRATORY (INHALATION)
Refills: 0 | Status: DISCONTINUED | OUTPATIENT
Start: 2023-10-04 | End: 2023-10-05

## 2023-10-04 RX ORDER — ACETAMINOPHEN 500 MG
650 TABLET ORAL EVERY 6 HOURS
Refills: 0 | Status: DISCONTINUED | OUTPATIENT
Start: 2023-10-04 | End: 2023-10-09

## 2023-10-04 RX ORDER — PANTOPRAZOLE 40 MG/1
40 TABLET, DELAYED RELEASE ORAL DAILY
Qty: 30 | Refills: 1 | Status: DISCONTINUED | COMMUNITY
Start: 2019-09-03 | End: 2023-10-04

## 2023-10-04 RX ORDER — ERGOCALCIFEROL 1.25 MG/1
1.25 MG CAPSULE, LIQUID FILLED ORAL
Qty: 4 | Refills: 0 | Status: DISCONTINUED | COMMUNITY
Start: 2019-02-04 | End: 2023-10-04

## 2023-10-04 RX ORDER — SERTRALINE 25 MG/1
150 TABLET, FILM COATED ORAL DAILY
Refills: 0 | Status: DISCONTINUED | OUTPATIENT
Start: 2023-10-04 | End: 2023-10-09

## 2023-10-04 RX ORDER — ZOLPIDEM TARTRATE 10 MG/1
10 TABLET, FILM COATED ORAL
Refills: 0 | Status: DISCONTINUED | COMMUNITY
Start: 2018-12-03 | End: 2023-10-04

## 2023-10-04 RX ORDER — ZOLPIDEM TARTRATE 10 MG/1
1 TABLET ORAL
Qty: 0 | Refills: 0 | DISCHARGE

## 2023-10-04 RX ORDER — SERTRALINE 25 MG/1
1.5 TABLET, FILM COATED ORAL
Refills: 0 | DISCHARGE

## 2023-10-04 RX ORDER — PROCHLORPERAZINE MALEATE 5 MG/1
5 TABLET ORAL
Qty: 6 | Refills: 0 | Status: DISCONTINUED | COMMUNITY
Start: 2019-02-06 | End: 2023-10-04

## 2023-10-04 RX ORDER — MOMETASONE 50 UG/1
50 SPRAY, METERED NASAL DAILY
Qty: 1 | Refills: 1 | Status: DISCONTINUED | COMMUNITY
Start: 2019-10-14 | End: 2023-10-04

## 2023-10-04 RX ORDER — IPRATROPIUM/ALBUTEROL SULFATE 18-103MCG
3 AEROSOL WITH ADAPTER (GRAM) INHALATION ONCE
Refills: 0 | Status: COMPLETED | OUTPATIENT
Start: 2023-10-04 | End: 2023-10-04

## 2023-10-04 RX ORDER — IPRATROPIUM/ALBUTEROL SULFATE 18-103MCG
3 AEROSOL WITH ADAPTER (GRAM) INHALATION EVERY 6 HOURS
Refills: 0 | Status: DISCONTINUED | OUTPATIENT
Start: 2023-10-04 | End: 2023-10-06

## 2023-10-04 RX ORDER — ZOLPIDEM TARTRATE 10 MG/1
10 TABLET ORAL
Refills: 0 | Status: ACTIVE | COMMUNITY
Start: 2023-10-04

## 2023-10-04 RX ORDER — LEVOFLOXACIN 750 MG/1
750 TABLET, FILM COATED ORAL DAILY
Refills: 0 | Status: ACTIVE | COMMUNITY
Start: 2023-10-04

## 2023-10-04 RX ORDER — SERTRALINE HYDROCHLORIDE 100 MG/1
100 TABLET, FILM COATED ORAL
Refills: 0 | Status: DISCONTINUED | COMMUNITY
Start: 2018-12-03 | End: 2023-10-04

## 2023-10-04 RX ORDER — LORATADINE 10 MG/1
1 TABLET ORAL
Qty: 0 | Refills: 0 | DISCHARGE

## 2023-10-04 RX ORDER — SODIUM CHLORIDE 9 MG/ML
1000 INJECTION INTRAMUSCULAR; INTRAVENOUS; SUBCUTANEOUS ONCE
Refills: 0 | Status: COMPLETED | OUTPATIENT
Start: 2023-10-04 | End: 2023-10-04

## 2023-10-04 RX ORDER — ASPIRIN/CALCIUM CARB/MAGNESIUM 324 MG
1 TABLET ORAL
Qty: 0 | Refills: 0 | DISCHARGE

## 2023-10-04 RX ORDER — ASPIRIN/CALCIUM CARB/MAGNESIUM 324 MG
324 TABLET ORAL ONCE
Refills: 0 | Status: COMPLETED | OUTPATIENT
Start: 2023-10-04 | End: 2023-10-04

## 2023-10-04 RX ORDER — FAMOTIDINE 40 MG/1
40 TABLET, FILM COATED ORAL
Qty: 60 | Refills: 11 | Status: DISCONTINUED | COMMUNITY
Start: 2021-08-04 | End: 2023-10-04

## 2023-10-04 RX ORDER — ALPRAZOLAM 0.25 MG/1
0.25 TABLET ORAL
Qty: 30 | Refills: 0 | Status: DISCONTINUED | COMMUNITY
Start: 2018-12-31 | End: 2023-10-04

## 2023-10-04 RX ORDER — PANTOPRAZOLE SODIUM 20 MG/1
1 TABLET, DELAYED RELEASE ORAL
Qty: 0 | Refills: 0 | DISCHARGE

## 2023-10-04 RX ORDER — ONDANSETRON 4 MG/1
4 TABLET, ORALLY DISINTEGRATING ORAL EVERY 6 HOURS
Refills: 0 | Status: ACTIVE | COMMUNITY
Start: 2023-10-04

## 2023-10-04 RX ORDER — LANOLIN ALCOHOL/MO/W.PET/CERES
3 CREAM (GRAM) TOPICAL AT BEDTIME
Refills: 0 | Status: DISCONTINUED | OUTPATIENT
Start: 2023-10-04 | End: 2023-10-09

## 2023-10-04 RX ORDER — CALCIUM CARBONATE 1177 MG/1
1177 BAR, CHEWABLE ORAL
Refills: 0 | Status: DISCONTINUED | COMMUNITY
Start: 2018-12-03 | End: 2023-10-04

## 2023-10-04 RX ORDER — LEVOFLOXACIN 5 MG/ML
750 INJECTION, SOLUTION INTRAVENOUS EVERY 24 HOURS
Refills: 0 | Status: COMPLETED | OUTPATIENT
Start: 2023-10-04 | End: 2023-10-04

## 2023-10-04 RX ORDER — PSYLLIUM SEED
48.57 PACKET (EA) ORAL
Refills: 0 | Status: DISCONTINUED | COMMUNITY
Start: 2018-12-03 | End: 2023-10-04

## 2023-10-04 RX ORDER — ALPRAZOLAM 1 MG/1
1 TABLET ORAL
Refills: 0 | Status: DISCONTINUED | COMMUNITY
Start: 2018-12-03 | End: 2023-10-04

## 2023-10-04 RX ORDER — PREDNISONE 10 MG/1
10 TABLET ORAL DAILY
Refills: 0 | Status: ACTIVE | COMMUNITY
Start: 2023-10-04

## 2023-10-04 RX ORDER — APIXABAN 2.5 MG/1
5 TABLET, FILM COATED ORAL EVERY 12 HOURS
Refills: 0 | Status: DISCONTINUED | OUTPATIENT
Start: 2023-10-04 | End: 2023-10-09

## 2023-10-04 RX ORDER — ZOLPIDEM TARTRATE 10 MG/1
5 TABLET ORAL AT BEDTIME
Refills: 0 | Status: DISCONTINUED | OUTPATIENT
Start: 2023-10-04 | End: 2023-10-05

## 2023-10-04 RX ORDER — ACETAMINOPHEN 500 MG
1000 TABLET ORAL ONCE
Refills: 0 | Status: COMPLETED | OUTPATIENT
Start: 2023-10-04 | End: 2023-10-04

## 2023-10-04 RX ADMIN — Medication 3 MILLILITER(S): at 11:49

## 2023-10-04 RX ADMIN — Medication 600 MILLIGRAM(S): at 18:35

## 2023-10-04 RX ADMIN — ZOLPIDEM TARTRATE 5 MILLIGRAM(S): 10 TABLET ORAL at 22:22

## 2023-10-04 RX ADMIN — BUDESONIDE AND FORMOTEROL FUMARATE DIHYDRATE 2 PUFF(S): 160; 4.5 AEROSOL RESPIRATORY (INHALATION) at 18:45

## 2023-10-04 RX ADMIN — Medication 400 MILLIGRAM(S): at 12:55

## 2023-10-04 RX ADMIN — LEVOFLOXACIN 750 MILLIGRAM(S): 5 INJECTION, SOLUTION INTRAVENOUS at 18:13

## 2023-10-04 RX ADMIN — Medication 324 MILLIGRAM(S): at 11:54

## 2023-10-04 RX ADMIN — Medication 3 MILLILITER(S): at 18:35

## 2023-10-04 RX ADMIN — Medication 3 MILLIGRAM(S): at 22:26

## 2023-10-04 RX ADMIN — SODIUM CHLORIDE 1000 MILLILITER(S): 9 INJECTION INTRAMUSCULAR; INTRAVENOUS; SUBCUTANEOUS at 12:55

## 2023-10-04 RX ADMIN — APIXABAN 5 MILLIGRAM(S): 2.5 TABLET, FILM COATED ORAL at 22:21

## 2023-10-04 NOTE — ED PROVIDER NOTE - PHYSICAL EXAMINATION
Constitutional: Well-appearing, well-nourished, comfortable appearing.   Head: Normocephalic, atraumatic.   Eyes: PERRL. EOMI. No conjunctival pallor.   ENT: Moist mucous membranes. Uvula midline. No pharyngeal erythema or exudates.  Neck: No LAD. Supple.  CVS: Regular rate, regular rhythm. Normal S1, S2. No murmurs, rubs, or gallops. No peripheral edema noted.   Respiratory: No respiratory distress. Clear to auscultation bilaterally. No wheezing, rales, or rhonchi.   Abdomen: Abd is soft and non-distended. No tenderness. No rebound, guarding, or rigidity.   MSK: No CVA tenderness bilaterally.   Neuro: Alert and oriented to person, place, and time. Follows commands. Moves all extremities.   Skin: Warm and dry. No rashes.   Psych: Normal affect, cooperative. Constitutional: Well-appearing, well-nourished, comfortable appearing.   Head: Normocephalic, atraumatic.   Eyes: PERRL. EOMI. No conjunctival pallor.   ENT: Moist mucous membranes. Uvula midline. No pharyngeal erythema or exudates.  Neck: No LAD. Supple.  CVS: Regular rate, regular rhythm. Normal S1, S2. No murmurs, rubs, or gallops. No peripheral edema noted.   Respiratory: No respiratory distress. Clear to auscultation bilaterally. No wheezing, rales, or rhonchi.   Abdomen: Abd is soft and non-distended. No tenderness. No rebound, guarding, or rigidity.   MSK: No CVA tenderness bilaterally.   Neuro: Alert and oriented to person, place, and time. Follows commands. Moves all extremities.   Skin: Warm and dry. No rashes.   Psych: Normal affect, cooperative.  Attending Amanda Butt: Gen: NAD, heent: atrauamtic, eomi, perrla, mmm, op pink, uvula midline, neck; nttp, no nuchal rigidity, chest: nttp, no crepitus, cv: rrr,, lungs: ctab, abd: soft, nontender, nondistended, no peritoneal signs, , no guarding, ext: wwp, neg homans, skin: no rash, neuro: awake and alert, following commands, speech clear, sensation and strength intact, no focal deficits

## 2023-10-04 NOTE — ED ADULT NURSE NOTE - OBJECTIVE STATEMENT
58 year old female with PMHx of Prinzmetal angina, recent PE on Eliquis, recent PNA on Levaquin presenting to the ED for worsening dyspnea on exertion over the past 2 days. As per patient, she reports that she was experiencing SOB, chills, night sweats 5 days ago while in [place] and was admitted for Left sided pneumonia and given IV Levaquin and discharged 2 days ago on oral Levaquin and Prednisone (finished this morning). Pt flew in yesterday and reports worsening shortness of breath on exertion, continued coughing, lightheadedness, and fatigue. Pt went to see her primary and cardiologist Dr. De Dios prior to arrival who sent her for further evaluation. She denies any headache, nausea, vomiting, diarrhea, abdominal pain, dysuria, changes in speech, numbness, fever, chills, and any additional complaints at this time

## 2023-10-04 NOTE — ED PROVIDER NOTE - CLINICAL SUMMARY MEDICAL DECISION MAKING FREE TEXT BOX
58 year old female with PMHx of Prinzmetal angina, recent PE on Eliquis, recent PNA on Levaquin here with worsening sob on exertion, lightheadedness, and cough, and chest tightness since discharge from hospital in [place] 2 days ago. Exam within normal limits. EKG shows new TWI anteriorly. Plan to order CXR and labs and discuss care with her cardiologist who sent her in. 58 year old female with PMHx of Prinzmetal angina, recent PE on Eliquis, recent PNA on Levaquin here with worsening sob on exertion, lightheadedness, and cough, and chest tightness since discharge from hospital in [place] 2 days ago. Exam within normal limits. EKG shows new TWI anteriorly. Plan to order CXR and labs and discuss care with her cardiologist who sent her in.  Attending Amanda Butt: 59 yo female h/o prior PE on blood thinners, recently treated with levaquin for PNA presenting with persistenet cough and chest pain. no h/o immunosuppression. had recent cta of the chest showing no evidence of PNA per pt. upon arrival pt awake and alert following cmmands. no evidence of hypoxia. ekg performed showing flipped t waves, which appear to be new. pt placed on monitor and given aspirin. consider possible acs. pocus with preserved EF, making post infectious myocarditis less likely. pt afebrile but does endorse a nonproductive cough. consider possible repeat pna. will obtain labs, consider repat ct of the chest to further evaluate, trend troponins. will d/w cards

## 2023-10-04 NOTE — H&P ADULT - NSHPLABSRESULTS_GEN_ALL_CORE
LABS:                         13.4   6.56  )-----------( 293      ( 04 Oct 2023 11:01 )             41.8     10-04    141  |  103  |  15  ----------------------------<  96  4.1   |  22  |  0.59    Ca    9.4      04 Oct 2023 11:01  Mg     2.1     10-04    TPro  7.4  /  Alb  4.6  /  TBili  0.6  /  DBili  x   /  AST  17  /  ALT  20  /  AlkPhos  47  10-04      Urinalysis Basic - ( 04 Oct 2023 11:01 )    Color: x / Appearance: x / SG: x / pH: x  Gluc: 96 mg/dL / Ketone: x  / Bili: x / Urobili: x   Blood: x / Protein: x / Nitrite: x   Leuk Esterase: x / RBC: x / WBC x   Sq Epi: x / Non Sq Epi: x / Bacteria: x    CT Chest:  IMPRESSION:    No pulmonary embolism.    Lingular opacification may represent atelectasis or pneumonia. Recommend   CT chest follow-up in 3 months to ensure clearing.

## 2023-10-04 NOTE — H&P ADULT - PROBLEM SELECTOR PLAN 1
#HERNANDEZ  #Chest Pain  Hx of vasospastic angina, WTC exposure, asthma, recent PE on Eliquis p/w persistent cough, HERNANDEZ, CP. Currently not hypoxic.   CTA Chest: No PE.   EKG: NSR w/ TWI in the septal/anterior precordial leads  Trop neg x1  RVP/COVID neg     - Cardiology following   - F/u repeat trop; if neg, can stop trending   - F/u sputum Cx   - Echo with bubble study to rule out shunt  - F/u pulmonary recs (Dr. Brody Rivas)

## 2023-10-04 NOTE — CONSULT NOTE ADULT - SUBJECTIVE AND OBJECTIVE BOX
Cardiology Consult Note   [Please check [email or web address] password: [email or web address]" for cardiology service schedule and contact information]    History of Present Illness:   Ms. Dorsey is a 57 yo F w/ hx of vasospastic angina, asthma, recent PE on apixaban, as well as recent PNA who presented from vascular clinic w/ SOB.    Pt has been in [facility] where she has another house the past few months. Reports she was dx w/ a PE, for which she was started on apixaban a few months ago. Since she's had exertional dysnpea, monitoring her amb SpO2 noting sometimes dropping below 70%. [place] which was tx'ed w/ levofloxacin and steroids. She flew back to [place] and felt more SOB and HERNANDEZ w/ coughing, lightheadedness, and fatigue. She went to vacular clinic where she was noted to be HDS w/ SpO2 95% on RA but appeared dysnpic so she was sent ot the ED. In the ED pt was HDS. EKG showed NSR w/ TWI in the septal/anterior precordial leads. CTPA showed no PE but L lingual opacity c/f atelectasis vs PNA. Labs were unremarkable including neg trop <6, neg BNP, normal lytes.    Prior EKGs were similar w/ TWI in V1-2.    Cardiology was asked to comment on the new TWI noted on EKG.      PMHx: reviewed, see below  SOCIAL HISTORY:  unchanged    MEDICATIONS:    REVIEW OF SYSTEMS:  CV: chest pain (-), palpitation (-), orthopnea (-), PND (-), edema (-)  PULM: SOB (-), cough (-), wheezing (-), hemoptysis (-).   CONST: fever (-), chills (-) or fatigue (-)  GI: abdominal distension (-), abdominal pain (-) , nausea/vomiting (-), hematemesis, (-), melena (-), hematochezia (-)  : dysuria (-), frequency (-), hematuria (-).   NEURO: numbness (-), weakness (-), dizziness (-)  SKIN: itching (-), rash (-)  HEENT:  visual changes (-); vertigo or throat pain (-);  neck stiffness (-)     All other review of systems is negative unless indicated above.   -------------------------------------------------------------------------------------------  PHYSICAL EXAM:  T(C): 37.1 (10-04-23 @ 11:59), Max: 37.1 (10-04-23 @ 09:40)  HR: 72 (10-04-23 @ 11:59) (72 - 73)  BP: 121/76 (10-04-23 @ 11:59) (114/82 - 121/76)  RR: 24 (10-04-23 @ 11:59) (22 - 24)  SpO2: 99% (10-04-23 @ 11:59) (98% - 99%)  Wt(kg): --  I&O's Summary    GEN: NAD, sitting in bed  HEENT: NCAT, EOMI  CV: RRR, Ns1/s2, no m/r/g, JVP not elevated  RESP: CTA B/l, no w/r/r  ABD: soft, nt, nd  Ext: warm, 2+ pulses, no edema  Neuro: AAOx3, no focal defcits    -------------------------------------------------------------------------------------------  LABS:                          13.4   6.56  )-----------( 293      ( 04 Oct 2023 11:01 )             41.8     10-04    141  |  103  |  15  ----------------------------<  96  4.1   |  22  |  0.59    Ca    9.4      04 Oct 2023 11:01  Mg     2.1     10-04    TPro  7.4  /  Alb  4.6  /  TBili  0.6  /  DBili  x   /  AST  17  /  ALT  20  /  AlkPhos  47  10-04      CARDIAC MARKERS ( 04 Oct 2023 11:01 )  <6 ng/L / x     / x     / x     / x     / x          -------------------------------------------------------------------------------------------  Meds:    -------------------------------------------------------------------------------------------  Cardiovascular Diagnostic Testing:      -------------------------------------------------------------------------------------------  Assessment and Plan:   Ms. Dorsey is a 57 yo F w/ hx of vasospastic angina, asthma, recent PE on apixaban, as well as recent PNA who presented from vascular clinic w/ SOB, found to have EKG w/ new TWI in V3-4 compared to prior, w/ normal hemodynamics, neg trop/BNP, and CTPA w/ no PE, but possible L lingua PNA vs atelectatsis. Cardiology was asked to comment on EKG changes and to help w/up her dyspnea.    1. TWI - non-specific and pt w/o CP w/ neg trop. Prior EKG from 2019 w/ TWI in V1-2, now noted to be V1-4. Overall, low suspicion for ischemia related as pt has no sx of CP, and neg w/up thus far.  2. Dysnpea - unclear etiology at this time, possibly related to CTPA findings of consolidation, unclear if more chronic process. From cardiology perspective, can get TTE to look for structural abnormalities, and assess for possible shunting.     Recommendations:  - Please get a formal TTE, w/ bubble study to assess for possible shunt  - r/p trop in 4-6hr, if negative can stop trending  - Continue home AC  - consider pulm eval for chronic dyspnea    *** Recommendations are preliminary until cosigned by the attending.    Paolo Maxwell MD  Cardiology Fellow    For all New Consults and Questions:  [email or web address]   Login: [email or web address]     Cardiology Consult Note   [Please check [email or web address] password: [email or web address]" for cardiology service schedule and contact information]      History of Present Illness:   Ms. Dorsey is a 59 yo F w/ hx of vasospastic angina, asthma, recent PE on apixaban, as well as recent PNA who presented from vascular clinic w/ SOB.    Pt has been in [place], where she has another house, the past few months. Reports she was dx w/ a PE, for which she was started on apixaban a few months ago. Since she's had exertional dyspnea, monitoring her amb SpO2 noting sometimes dropping below 70%. She was hospitalized recently in [place] for PNA, which was tx'ed w/ levofloxacin and steroids.     She flew back to [place] and felt more SOB and HERNANDEZ w/ coughing, lightheadedness, and fatigue. She went to vascular clinic where she was noted to be HDS w/ SpO2 95% on RA, but appeared dyspneic, so she was sent ot the ED. In the ED, pt was also HDS. EKG showed NSR w/ TWI in the septal/anterior precordial leads. CTPA showed no PE but L lingualar opacity c/f atelectasis vs PNA.     Labs were unremarkable including neg trop <6, neg BNP, normal lytes.  Prior EKGs were similar w/ TWI in V1-2.  Cardiology was asked to comment on the new TWI noted on EKG.      Allergies:  penicillin:  Anaphylaxis  sulfamethoxazole:  Anaphylaxis  shellfish: Food, Other, throat closes up    Home Medications:   levoFLOXacin 750 mg oral tablet: Last Dose Taken:  , 1 tab(s) orally once a day for 6 days  sertraline 100 mg oral tablet: Last Dose Taken:  , 1.5 tab(s) orally once a day  Ambien 10 mg oral tablet: Last Dose Taken:  , 1 tab(s) orally once a day (at bedtime)  Eliquis 5 mg oral tablet: Last Dose Taken:  , 1 tab(s) orally 2 times a day      PAST MEDICAL HISTORY:  Chronic GERD   H/O Prinzmetal angina   Hypertrophy of breast   Insomnia   Malignant melanoma, unspecified site R arm  Seasonal allergies.     PAST SURGICAL HISTORY:  S/P laparoscopic cholecystectomy (2011)  S/P Mohs surgery for basal cell carcinoma (Right side of face, 2018)  S/P Shoulder Surgery (Right with titanium anchors- 1/2006, 2007, 7/2008 & 11/2008).     FAMILY HISTORY:  No pertinent cardiac history in first degree relatives.     Social History:  denies  tobacco use  social etoh use   denies drug use  lives with    retired       REVIEW OF SYSTEMS:  CV: chest pain (-), palpitation (-), orthopnea (-), PND (-), edema (-)  PULM: SOB (+), cough (+), wheezing (-), hemoptysis (-).   CONST: fever (-), chills (-) or fatigue (+)  GI: abdominal distension (-), abdominal pain (-) , nausea/vomiting (-), hematemesis, (-), melena (-), hematochezia (-)  : dysuria (-), frequency (-), hematuria (-).   NEURO: numbness (-), weakness (-), dizziness (+/-)  SKIN: itching (-), rash (-)  HEENT:  visual changes (-); vertigo or throat pain (-);  neck stiffness (-)   All other review of systems is negative unless indicated above.   -------------------------------------------------------------------------------------------    PHYSICAL EXAM:  T(C): 37.1 (10-04-23 @ 11:59), Max: 37.1 (10-04-23 @ 09:40)  HR: 72 (10-04-23 @ 11:59) (72 - 73)  BP: 121/76 (10-04-23 @ 11:59) (114/82 - 121/76)  RR: 24 (10-04-23 @ 11:59) (22 - 24)  SpO2: 99% (10-04-23 @ 11:59) (98% - 99%)      GEN: NAD, sitting in bed  HEENT: NCAT, EOMI  CV: RRR, Ns1/s2, no m/r/g, JVP not elevated  RESP: CTA B/l, no w/r/r  ABD: soft, nt, nd  Ext: warm, 2+ pulses, no edema  Neuro: AAOx3, no focal defcits    -------------------------------------------------------------------------------------------  LABS:                      13.4   6.56  )-----------( 293      ( 04 Oct 2023 11:01 )             41.8     10-04  141  |  103  |  15  ----------------------------<  96  4.1   |  22  |  0.59    Ca    9.4      04 Oct 2023 11:01  Mg     2.1     10-04    TPro  7.4  /  Alb  4.6  /  TBili  0.6  /  DBili  x   /  AST  17  /  ALT  20  /  AlkPhos  47  10-04    CARDIAC MARKERS ( 04 Oct 2023 11:01 )  <6 ng/L / x     / x     / x     / x     / x                -------------------------------------------------------------------------------------------  Assessment and Plan:   Ms. Dorsey is a 59 yo F w/ hx of vasospastic angina, asthma, recent PE on apixaban, as well as recent PNA.  Presented from vascular clinic w/ SOB.  Found to have EKG w/ new TWI in V3-4 compared to prior, w/ normal hemodynamics, neg trop/BNP, and CTPA w/ no PE, but possible L lingua PNA vs atelectatsis.   Cardiology was asked to comment on EKG changes and to help w/up her dyspnea.    1. TWI - non-specific and pt w/o CP w/ neg trop. Prior EKG from 2019 w/ TWI in V1-2, now noted to be V1-4. Overall, low suspicion for ischemia as cause, as pt has no sx of CP and neg w/up thus far.  2. Dysnpea - unclear etiology at this time, possibly related to CTPA findings of consolidation, unclear if more chronic process. From cardiology perspective, can get TTE to look for structural abnormalities, and assess for possible shunting.     Recommendations:  - Please get a formal TTE, w/ bubble study to assess for possible shunt  - r/p trop in 4-6hr, if negative can stop trending  - Continue home AC  - consider pulm eval for chronic dyspnea      Paolo Maxwell MD  Cardiology Fellow    Plan discussed with cardiology fellow.  Patient seen and examined.  Hx., exam and labs as above.  I agree with the assessment and recommendations, which I have reviewed and edited where appropriate.  Silver Leach M.D.  Cardiology Attending, Consult Service    For Cardiology consults and questions, all Cardiology service information can be found 24/7 on [email or web address] - use password: [email or web address] to log in.

## 2023-10-04 NOTE — H&P ADULT - NSHPSOCIALHISTORY_GEN_ALL_CORE
denies current tobacco use  social etoh use   denies drug use  lives with    no difficulties with ambulation PTA  retired

## 2023-10-04 NOTE — ED PROVIDER NOTE - ATTENDING CONTRIBUTION TO CARE
Attending MD Amanda Butt:  I personally have seen and examined this patient.  Fellow note reviewed and agree on plan of care and except where noted.  See HPI, PE, and MDM for details.

## 2023-10-04 NOTE — H&P ADULT - NSHPPHYSICALEXAM_GEN_ALL_CORE
Vital Signs Last 24 Hrs  T(C): 37.1 (04 Oct 2023 11:59), Max: 37.1 (04 Oct 2023 09:40)  T(F): 98.7 (04 Oct 2023 11:59), Max: 98.7 (04 Oct 2023 09:40)  HR: 72 (04 Oct 2023 11:59) (72 - 73)  BP: 121/76 (04 Oct 2023 11:59) (114/82 - 121/76)  BP(mean): --  RR: 24 (04 Oct 2023 11:59) (22 - 24)  SpO2: 99% (04 Oct 2023 11:59) (98% - 99%)    Parameters below as of 04 Oct 2023 11:59  Patient On (Oxygen Delivery Method): room air        CONSTITUTIONAL: Well-groomed, in no apparent distress  EYES: No conjunctival or scleral injection, non-icteric; EOMI   ENMT: No external nasal lesions; no pharyngeal injection or exudates, oral mucosa with moist membranes  NECK: Supple; Trachea midline  RESPIRATORY: On RA; lungs CTA without wheeze  CARDIOVASCULAR: +S1S2, RRR; no lower extremity edema  GASTROINTESTINAL: No tenderness, +BS throughout, no rebound/guarding  MUSCULOSKELETAL: normal strength and tone of extremities  SKIN: No rashes or ulcers noted  NEUROLOGIC: grossly nonfocal; sensation intact in LEs b/l to light touch  PSYCHIATRIC: A+O x 3; mood and affect appropriate; appropriate insight and judgment

## 2023-10-04 NOTE — H&P ADULT - NSHPREVIEWOFSYSTEMS_GEN_ALL_CORE
Review of Systems:   CONSTITUTIONAL: No fever  EYES: No eye pain, visual disturbances  ENMT:  No tinnitus, vertigo; No sinus or throat pain  RESPIRATORY: +SOB, cough, no wheezing, chills or hemoptysis  CARDIOVASCULAR: +chest pain, no palpitations, dizziness, or leg swelling  GASTROINTESTINAL: No abdominal or epigastric pain. No nausea, vomiting, or hematemesis; No diarrhea or constipation. No melena or hematochezia.  GENITOURINARY: No dysuria, frequency, hematuria  NEUROLOGICAL: No headaches,  numbness, or tremors  SKIN: No itching, burning  MUSCULOSKELETAL: No joint pain or swelling  PSYCHIATRIC: No depression, anxiety, mood swings  HEME/LYMPH: No easy bruising, or bleeding gums

## 2023-10-04 NOTE — ED ADULT NURSE NOTE - NSFALLUNIVINTERV_ED_ALL_ED
Bed/Stretcher in lowest position, wheels locked, appropriate side rails in place/Call bell, personal items and telephone in reach/Instruct patient to call for assistance before getting out of bed/chair/stretcher/Non-slip footwear applied when patient is off stretcher/[place] to call system/Physically safe environment - no spills, clutter or unnecessary equipment/Purposeful proactive rounding/Room/bathroom lighting operational, light cord in reach

## 2023-10-04 NOTE — H&P ADULT - ASSESSMENT
58Y F w/ hx of vasospastic angina, asthma, recent PE on apixaban, as well as recent PNA presents from vascular cards clinic w/ persistent SOB and chest pain.

## 2023-10-04 NOTE — H&P ADULT - PROBLEM SELECTOR PLAN 3
Hx of WTC exposure, asthma. During hospitalization at OSH, was prescribed prednisone taper which she has now completed. She is currently on RA. Lungs CTA b/l; no wheezes. Not in an acute asthma exacerbation.   -c/w Symbicort, Duonebs prn

## 2023-10-04 NOTE — ED PROVIDER NOTE - OBJECTIVE STATEMENT
58 year old female with PMHx of 58 year old female with PMHx of Prinzmetal angina, recent PE on Eliquis, recent PNA on Levaquin presenting to the ED for worsening dyspnea on exertion over the past 2 days. As per patient, she reports that she was experiencing SOB, chills, night sweats 5 days ago while in [place] and was admitted for Left sided pneumonia and given IV Levaquin and discharged 2 days ago on oral Levaquin and Prednisone (finished this morning). Pt flew in yesterday and reports worsening shortness of breath on exertion, continued coughing, lightheadedness, and fatigue. Pt went to see her primary and cardiologist Dr. De Dios prior to arrival who sent her for further evaluation. She denies any headache, nausea, vomiting, diarrhea, abdominal pain, dysuria, changes in speech, numbness, fever, chills, and any additional complaints at this time.     PMD: Dr. Mike De Dios   Pulmonologist: Dr. Brody Rivas (has upcoming appointment)

## 2023-10-04 NOTE — ED ADULT NURSE REASSESSMENT NOTE - NS ED NURSE REASSESS COMMENT FT1
Report taken from ANGEL MILLER. Pt and family introduced to oncoming RN and updated on plan of care. pt is A&OX4, ambulatory, VSS, pending sputum culture (specimen container at bedside, pt educated on the reason of the sputum culture). Call bell in reach, pt and family educated on use. Bed locked and in lowest position. Denies any needs or complaints at this time.

## 2023-10-04 NOTE — ED PROVIDER NOTE - PROGRESS NOTE DETAILS
Attending Amanda Butt: cta with ?possible pNa vs atelectasis. pt did have recent pna and treated no productive cough or leukocytosis. will send procalcitonin. awaiting cards fellow and pulmonary Attending Amanda Butt: d/w hospitalist, awaiting procalcitonin, pt afebrile in the ed. will monitor for fever, cultures sent willhold on abx at this time. low threshold to start if develops fever

## 2023-10-04 NOTE — H&P ADULT - HISTORY OF PRESENT ILLNESS
58Y F w/ hx of vasospastic angina, asthma, recent PE on apixaban, as well as recent PNA presents from vascular cards clinic w/ SOB and chest pain.   Pt has been in [place] where she has another house the past few months. Reports she was dx w/ a PE, for which she was started on apixaban. Since she's had exertional dysnpea, monitoring her amb SpO2 noting sometimes dropping below 70%. She was hospitalized recently in [place] for PNA and was treated w/ levofloxacin and steroids. She flew back to [place] and felt more SOB and HERNANDEZ w/ coughing, lightheadedness, chest pressure/heaviness and fatigue. She went to vacular clinic where she was noted to be HDS w/ SpO2 95% on RA but appeared dysnpic so she was sent to the ED. In the ED pt was HDS. EKG showed NSR w/ TWI in the septal/anterior precordial leads. CTPA showed no PE but L lingual opacity c/f atelectasis vs PNA. Labs were unremarkable including neg trop <6, neg BNP. She was seen by cardiology in ED who recommended  TTE, w/ bubble study to assess for possible shunt, repeat trops and pulm evaluation. Patient admitted to medicine for further evaluation.

## 2023-10-04 NOTE — H&P ADULT - PROBLEM SELECTOR PLAN 4
Recent hospitalization at OSH where she was dx with PNA. Treated with course of abx. Last day of levofloxacin 10/4.  -Lingular opacification seen on CT Chest. Recommend CT chest follow-up in 3 months

## 2023-10-05 DIAGNOSIS — R65.10 SYSTEMIC INFLAMMATORY RESPONSE SYNDROME (SIRS) OF NON-INFECTIOUS ORIGIN WITHOUT ACUTE ORGAN DYSFUNCTION: ICD-10-CM

## 2023-10-05 LAB
ANION GAP SERPL CALC-SCNC: 13 MMOL/L — SIGNIFICANT CHANGE UP (ref 5–17)
BASOPHILS # BLD AUTO: 0.04 K/UL — SIGNIFICANT CHANGE UP (ref 0–0.2)
BASOPHILS NFR BLD AUTO: 0.4 % — SIGNIFICANT CHANGE UP (ref 0–2)
BUN SERPL-MCNC: 13 MG/DL — SIGNIFICANT CHANGE UP (ref 7–23)
CALCIUM SERPL-MCNC: 8.9 MG/DL — SIGNIFICANT CHANGE UP (ref 8.4–10.5)
CHLORIDE SERPL-SCNC: 102 MMOL/L — SIGNIFICANT CHANGE UP (ref 96–108)
CO2 SERPL-SCNC: 23 MMOL/L — SIGNIFICANT CHANGE UP (ref 22–31)
CREAT SERPL-MCNC: 0.59 MG/DL — SIGNIFICANT CHANGE UP (ref 0.5–1.3)
CRP SERPL-MCNC: 14 MG/L — HIGH (ref 0–4)
EGFR: 106 ML/MIN/1.73M2 — SIGNIFICANT CHANGE UP
EOSINOPHIL # BLD AUTO: 0.13 K/UL — SIGNIFICANT CHANGE UP (ref 0–0.5)
EOSINOPHIL NFR BLD AUTO: 1.3 % — SIGNIFICANT CHANGE UP (ref 0–6)
ERYTHROCYTE [SEDIMENTATION RATE] IN BLOOD: 21 MM/HR — HIGH (ref 0–20)
GAS PNL BLDA: SIGNIFICANT CHANGE UP
GLUCOSE SERPL-MCNC: 103 MG/DL — HIGH (ref 70–99)
HCT VFR BLD CALC: 34.8 % — SIGNIFICANT CHANGE UP (ref 34.5–45)
HGB BLD-MCNC: 11.5 G/DL — SIGNIFICANT CHANGE UP (ref 11.5–15.5)
IMM GRANULOCYTES NFR BLD AUTO: 0.7 % — SIGNIFICANT CHANGE UP (ref 0–0.9)
LEGIONELLA AG UR QL: NEGATIVE — SIGNIFICANT CHANGE UP
LYMPHOCYTES # BLD AUTO: 1.44 K/UL — SIGNIFICANT CHANGE UP (ref 1–3.3)
LYMPHOCYTES # BLD AUTO: 14.7 % — SIGNIFICANT CHANGE UP (ref 13–44)
MCHC RBC-ENTMCNC: 29.6 PG — SIGNIFICANT CHANGE UP (ref 27–34)
MCHC RBC-ENTMCNC: 33 GM/DL — SIGNIFICANT CHANGE UP (ref 32–36)
MCV RBC AUTO: 89.7 FL — SIGNIFICANT CHANGE UP (ref 80–100)
MONOCYTES # BLD AUTO: 0.75 K/UL — SIGNIFICANT CHANGE UP (ref 0–0.9)
MONOCYTES NFR BLD AUTO: 7.7 % — SIGNIFICANT CHANGE UP (ref 2–14)
NEUTROPHILS # BLD AUTO: 7.36 K/UL — SIGNIFICANT CHANGE UP (ref 1.8–7.4)
NEUTROPHILS NFR BLD AUTO: 75.2 % — SIGNIFICANT CHANGE UP (ref 43–77)
NRBC # BLD: 0 /100 WBCS — SIGNIFICANT CHANGE UP (ref 0–0)
PLATELET # BLD AUTO: 248 K/UL — SIGNIFICANT CHANGE UP (ref 150–400)
POTASSIUM SERPL-MCNC: 3.5 MMOL/L — SIGNIFICANT CHANGE UP (ref 3.5–5.3)
POTASSIUM SERPL-SCNC: 3.5 MMOL/L — SIGNIFICANT CHANGE UP (ref 3.5–5.3)
PROCALCITONIN SERPL-MCNC: 0.97 NG/ML — HIGH (ref 0.02–0.1)
RBC # BLD: 3.88 M/UL — SIGNIFICANT CHANGE UP (ref 3.8–5.2)
RBC # FLD: 12.9 % — SIGNIFICANT CHANGE UP (ref 10.3–14.5)
S PNEUM AG UR QL: NEGATIVE — SIGNIFICANT CHANGE UP
SODIUM SERPL-SCNC: 138 MMOL/L — SIGNIFICANT CHANGE UP (ref 135–145)
TROPONIN T, HIGH SENSITIVITY RESULT: <6 NG/L — SIGNIFICANT CHANGE UP (ref 0–51)
WBC # BLD: 9.79 K/UL — SIGNIFICANT CHANGE UP (ref 3.8–10.5)
WBC # FLD AUTO: 9.79 K/UL — SIGNIFICANT CHANGE UP (ref 3.8–10.5)

## 2023-10-05 PROCEDURE — 99222 1ST HOSP IP/OBS MODERATE 55: CPT

## 2023-10-05 PROCEDURE — 93970 EXTREMITY STUDY: CPT | Mod: 26

## 2023-10-05 PROCEDURE — 99233 SBSQ HOSP IP/OBS HIGH 50: CPT

## 2023-10-05 PROCEDURE — 99233 SBSQ HOSP IP/OBS HIGH 50: CPT | Mod: GC

## 2023-10-05 RX ORDER — GUAIFENESIN/DEXTROMETHORPHAN 600MG-30MG
10 TABLET, EXTENDED RELEASE 12 HR ORAL EVERY 6 HOURS
Refills: 0 | Status: DISCONTINUED | OUTPATIENT
Start: 2023-10-05 | End: 2023-10-09

## 2023-10-05 RX ORDER — ERTAPENEM SODIUM 1 G/1
1000 INJECTION, POWDER, LYOPHILIZED, FOR SOLUTION INTRAMUSCULAR; INTRAVENOUS EVERY 24 HOURS
Refills: 0 | Status: DISCONTINUED | OUTPATIENT
Start: 2023-10-06 | End: 2023-10-06

## 2023-10-05 RX ORDER — CHLORHEXIDINE GLUCONATE 213 G/1000ML
1 SOLUTION TOPICAL
Refills: 0 | Status: DISCONTINUED | OUTPATIENT
Start: 2023-10-05 | End: 2023-10-09

## 2023-10-05 RX ORDER — ACETAMINOPHEN 500 MG
1000 TABLET ORAL ONCE
Refills: 0 | Status: COMPLETED | OUTPATIENT
Start: 2023-10-05 | End: 2023-10-05

## 2023-10-05 RX ORDER — ZOLPIDEM TARTRATE 10 MG/1
5 TABLET ORAL AT BEDTIME
Refills: 0 | Status: DISCONTINUED | OUTPATIENT
Start: 2023-10-05 | End: 2023-10-09

## 2023-10-05 RX ORDER — ERTAPENEM SODIUM 1 G/1
1000 INJECTION, POWDER, LYOPHILIZED, FOR SOLUTION INTRAMUSCULAR; INTRAVENOUS ONCE
Refills: 0 | Status: COMPLETED | OUTPATIENT
Start: 2023-10-05 | End: 2023-10-05

## 2023-10-05 RX ORDER — ERTAPENEM SODIUM 1 G/1
INJECTION, POWDER, LYOPHILIZED, FOR SOLUTION INTRAMUSCULAR; INTRAVENOUS
Refills: 0 | Status: DISCONTINUED | OUTPATIENT
Start: 2023-10-05 | End: 2023-10-06

## 2023-10-05 RX ORDER — BUDESONIDE AND FORMOTEROL FUMARATE DIHYDRATE 160; 4.5 UG/1; UG/1
2 AEROSOL RESPIRATORY (INHALATION)
Refills: 0 | Status: DISCONTINUED | OUTPATIENT
Start: 2023-10-05 | End: 2023-10-09

## 2023-10-05 RX ADMIN — Medication 400 MILLIGRAM(S): at 00:56

## 2023-10-05 RX ADMIN — Medication 3 MILLILITER(S): at 17:37

## 2023-10-05 RX ADMIN — Medication 650 MILLIGRAM(S): at 07:41

## 2023-10-05 RX ADMIN — Medication 3 MILLILITER(S): at 22:28

## 2023-10-05 RX ADMIN — SERTRALINE 150 MILLIGRAM(S): 25 TABLET, FILM COATED ORAL at 13:04

## 2023-10-05 RX ADMIN — Medication 600 MILLIGRAM(S): at 06:48

## 2023-10-05 RX ADMIN — Medication 3 MILLILITER(S): at 06:49

## 2023-10-05 RX ADMIN — ERTAPENEM SODIUM 1000 MILLIGRAM(S): 1 INJECTION, POWDER, LYOPHILIZED, FOR SOLUTION INTRAMUSCULAR; INTRAVENOUS at 13:08

## 2023-10-05 RX ADMIN — ZOLPIDEM TARTRATE 5 MILLIGRAM(S): 10 TABLET ORAL at 23:48

## 2023-10-05 RX ADMIN — ZOLPIDEM TARTRATE 5 MILLIGRAM(S): 10 TABLET ORAL at 22:27

## 2023-10-05 RX ADMIN — APIXABAN 5 MILLIGRAM(S): 2.5 TABLET, FILM COATED ORAL at 17:37

## 2023-10-05 RX ADMIN — BUDESONIDE AND FORMOTEROL FUMARATE DIHYDRATE 2 PUFF(S): 160; 4.5 AEROSOL RESPIRATORY (INHALATION) at 20:43

## 2023-10-05 RX ADMIN — BUDESONIDE AND FORMOTEROL FUMARATE DIHYDRATE 2 PUFF(S): 160; 4.5 AEROSOL RESPIRATORY (INHALATION) at 06:49

## 2023-10-05 RX ADMIN — Medication 650 MILLIGRAM(S): at 20:44

## 2023-10-05 RX ADMIN — Medication 100 MILLIGRAM(S): at 06:48

## 2023-10-05 RX ADMIN — APIXABAN 5 MILLIGRAM(S): 2.5 TABLET, FILM COATED ORAL at 06:48

## 2023-10-05 RX ADMIN — Medication 650 MILLIGRAM(S): at 06:47

## 2023-10-05 RX ADMIN — Medication 3 MILLILITER(S): at 13:05

## 2023-10-05 RX ADMIN — Medication 650 MILLIGRAM(S): at 21:45

## 2023-10-05 NOTE — CHART NOTE - NSCHARTNOTEFT_GEN_A_CORE
MEDICINE PA    Notified by RN patient with temperature of 101.6 . Seen and examined patient at bedside. Patient is alert, NAD. Denies HA, CP, SOB, cough, N/V, or abd pain.    VITAL SIGNS:  T(C): 37.7 (10-05-23 @ 01:50), Max: 38.7 (10-05-23 @ 00:47)  HR: 123 (10-05-23 @ 00:47) (72 - 123)  BP: 93/56 (10-05-23 @ 00:47) (93/56 - 121/76)  RR: 18 (10-05-23 @ 00:47) (18 - 24)  SpO2: 98% (10-05-23 @ 00:47) (95% - 99%)  Wt(kg): --      LABORATORY:                          13.4   6.56  )-----------( 293      ( 04 Oct 2023 11:01 )             41.8       10-04    141  |  103  |  15  ----------------------------<  96  4.1   |  22  |  0.59    Ca    9.4      04 Oct 2023 11:01  Mg     2.1     10-04    TPro  7.4  /  Alb  4.6  /  TBili  0.6  /  DBili  x   /  AST  17  /  ALT  20  /  AlkPhos  47  10-04          MICROBIOLOGY:         RADIOLOGY:          PHYSICAL EXAM:    Constitutional: AOx3. NAD.    Respiratory: clear lungs bilaterally. No wheezing, rhonchi, or crackles.    Cardiovascular: S1 S2. No murmurs.    Gastrointestinal: BS X4 active. soft. nontender.    Extremities/Vascular: +2 pulses bilaterally. No BLE edema.      ASSESSMENT/PLAN:   HPI:  58Y F w/ hx of vasospastic angina, asthma, recent PE on apixaban, as well as recent PNA presents from vascular cards clinic w/ SOB and chest pain.   Pt has been in [place] where she has another house the past few months. Reports she was dx w/ a PE, for which she was started on apixaban. Since she's had exertional dysnpea, monitoring her amb SpO2 noting sometimes dropping below 70%. She was hospitalized recently in [place] for PNA and was treated w/ levofloxacin and steroids. She flew back to [place] and felt more SOB and HERNANDEZ w/ coughing, lightheadedness, chest pressure/heaviness and fatigue. She went to vacular clinic where she was noted to be HDS w/ SpO2 95% on RA but appeared dysnpic so she was sent to the ED. In the ED pt was HDS. EKG showed NSR w/ TWI in the septal/anterior precordial leads. CTPA showed no PE but L lingual opacity c/f atelectasis vs PNA. Labs were unremarkable including neg trop <6, neg BNP. She was seen by cardiology in ED who recommended  TTE, w/ bubble study to assess for possible shunt, repeat trops and pulm evaluation. Patient admitted to medicine for further evaluation.      (04 Oct 2023 15:25)        1) Fever  -IV tylenol and cooling measures prn for pyrexia  -BC x2, UA/UC pending  - WIll monitor patient closely overnight  - Pulm consult in am   -F/U primary team in AM    Tavo Fairbanks PA-C   Department of Medicine

## 2023-10-05 NOTE — PROGRESS NOTE ADULT - SUBJECTIVE AND OBJECTIVE BOX
Annette Douglass MD  Division of Hospital Medicine  Reachable on MS Teams    PROGRESS NOTE:     Patient is a 58y old  Female who presents with a chief complaint of CC: CP, SOB (05 Oct 2023 07:47)      SUBJECTIVE / OVERNIGHT EVENTS: Febrile with rigors overnight, seen this AM. Resting in bed,  at bedside. Reports dyspnea with exertion, some lightheadedness and rib pain from cough. Chest pain has resolved, pulm at bedside    ADDITIONAL REVIEW OF SYSTEMS:    MEDICATIONS  (STANDING):  albuterol/ipratropium for Nebulization 3 milliLiter(s) Nebulizer every 6 hours  apixaban 5 milliGRAM(s) Oral every 12 hours  budesonide 160 MICROgram(s)/formoterol 4.5 MICROgram(s) Inhaler 2 Puff(s) Inhalation two times a day  chlorhexidine 2% Cloths 1 Application(s) Topical <User Schedule>  ertapenem  IVPB      guaiFENesin  milliGRAM(s) Oral every 12 hours  predniSONE   Tablet 40 milliGRAM(s) Oral daily  sertraline 150 milliGRAM(s) Oral daily    MEDICATIONS  (PRN):  acetaminophen     Tablet .. 650 milliGRAM(s) Oral every 6 hours PRN Temp greater or equal to 38C (100.4F), Mild Pain (1 - 3)  melatonin 3 milliGRAM(s) Oral at bedtime PRN Insomnia  zolpidem 5 milliGRAM(s) Oral at bedtime PRN Insomnia      CAPILLARY BLOOD GLUCOSE        I&O's Summary    05 Oct 2023 07:01  -  05 Oct 2023 14:19  --------------------------------------------------------  IN: 240 mL / OUT: 0 mL / NET: 240 mL        PHYSICAL EXAM:  Vital Signs Last 24 Hrs  T(C): 36.9 (05 Oct 2023 11:09), Max: 38.7 (05 Oct 2023 00:47)  T(F): 98.4 (05 Oct 2023 11:09), Max: 101.6 (05 Oct 2023 00:47)  HR: 79 (05 Oct 2023 11:09) (79 - 123)  BP: 120/86 (05 Oct 2023 11:09) (93/56 - 120/86)  BP(mean): 86 (04 Oct 2023 19:30) (86 - 86)  RR: 18 (05 Oct 2023 11:09) (18 - 22)  SpO2: 94% (05 Oct 2023 11:09) (94% - 99%)    Parameters below as of 05 Oct 2023 11:09  Patient On (Oxygen Delivery Method): room air        CONSTITUTIONAL: NAD, well-developed  RESPIRATORY: Normal respiratory effort; lungs are clear to auscultation bilaterally though coughs with deep inspiration, becomes SOB easily   CARDIOVASCULAR: Regular rate and rhythm, normal S1 and S2, no murmur/rub/gallop; trace LE pitting edema on L; Peripheral pulses are 2+ bilaterally  ABDOMEN: LUQ mild tenderness to palpation, normoactive bowel sounds, no rebound/guarding; No hepatosplenomegaly  MUSCLOSKELETAL: no clubbing or cyanosis of digits; no joint swelling or tenderness to palpation  PSYCH: A+O to person, place, and time; affect appropriate    LABS:                        11.5   9.79  )-----------( 248      ( 05 Oct 2023 07:16 )             34.8     10-05    138  |  102  |  13  ----------------------------<  103<H>  3.5   |  23  |  0.59    Ca    8.9      05 Oct 2023 07:13  Mg     2.1     10-04    TPro  7.4  /  Alb  4.6  /  TBili  0.6  /  DBili  x   /  AST  17  /  ALT  20  /  AlkPhos  47  10-04          Urinalysis Basic - ( 05 Oct 2023 07:13 )    Color: x / Appearance: x / SG: x / pH: x  Gluc: 103 mg/dL / Ketone: x  / Bili: x / Urobili: x   Blood: x / Protein: x / Nitrite: x   Leuk Esterase: x / RBC: x / WBC x   Sq Epi: x / Non Sq Epi: x / Bacteria: x          RADIOLOGY & ADDITIONAL TESTS:  Results Reviewed:   Imaging Personally Reviewed:  Electrocardiogram Personally Reviewed:    COORDINATION OF CARE:  Care Discussed with Consultants/Other Providers [Y/N]: Pulmonology  Prior or Outpatient Records Reviewed [Y/N]:

## 2023-10-05 NOTE — PROGRESS NOTE ADULT - SUBJECTIVE AND OBJECTIVE BOX
Cardiology Progress Note  ------------------------------------------------------------------------------------------    SUBJECTIVE/EVENTS::  - Tele: Sinus tach during fever, otherwise no events  - Febrile o/n w/ chills and rigors    -------------------------------------------------------------------------------------------  ROS:  CV: chest pain (-), palpitation (-), orthopnea (-), PND (-), edema (-)  PULM: SOB (+), cough (+), wheezing (-), hemoptysis (-).   CONST: fever (+), chills (+) or fatigue (-)  GI: abdominal distension (-), abdominal pain (-) , nausea/vomiting (-), hematemesis, (-), melena (-), hematochezia (-)  : dysuria (-), frequency (-), hematuria (-).   NEURO: numbness (-), weakness (-), dizziness (-)  MSK: myalgia (-), joint pain (-)   SKIN: itching (-), rash (-)  HEENT:  visual changes (-); vertigo or throat pain (-);  neck stiffness (-)   Psych: change in mood (-), anxiety (-), depression (-)     All other review of systems is negative unless indicated above.   -------------------------------------------------------------------------------------------  VS:  T(F): 98.7 (10-05), Max: 101.6 (10-05)  HR: 90 (10-05) (72 - 123)  BP: 101/68 (10-05) (93/56 - 121/76)  RR: 18 (10-05)  SpO2: 96% (10-05)  I&O's Summary    ------------------------------------------------------------------------------------------  PHYSICAL EXAM:  GEN: NAD, sitting in bed  HEENT: NCAT, EOMI  CV: RRR, Ns1/s2, no m/r/g, JVP not elevated  RESP: CTA B/l, no w/r/r  ABD: soft, nt, nd  Ext: warm, 2+ pulses, no edema  Neuro: AAOx3, no focal deficits    -------------------------------------------------------------------------------------------  LABS:                          13.4   6.56  )-----------( 293      ( 04 Oct 2023 11:01 )             41.8     10-04    141  |  103  |  15  ----------------------------<  96  4.1   |  22  |  0.59    Ca    9.4      04 Oct 2023 11:01  Mg     2.1     10-04    TPro  7.4  /  Alb  4.6  /  TBili  0.6  /  DBili  x   /  AST  17  /  ALT  20  /  AlkPhos  47  10-04      CARDIAC MARKERS ( 04 Oct 2023 11:01 )  <6 ng/L / x     / x     / x     / x     / x          -------------------------------------------------------------------------------------------  Meds:  acetaminophen     Tablet .. 650 milliGRAM(s) Oral every 6 hours PRN  albuterol/ipratropium for Nebulization 3 milliLiter(s) Nebulizer every 6 hours  apixaban 5 milliGRAM(s) Oral every 12 hours  budesonide  80 MICROgram(s)/formoterol 4.5 MICROgram(s) Inhaler 2 Puff(s) Inhalation two times a day  guaiFENesin  milliGRAM(s) Oral every 12 hours  melatonin 3 milliGRAM(s) Oral at bedtime PRN  sertraline 150 milliGRAM(s) Oral daily  zolpidem 5 milliGRAM(s) Oral at bedtime PRN    -------------------------------------------------------------------------------------------  Cardiovascular Diagnostic Testing:  TTE 10/4  CONCLUSIONS:      1. Left ventricular cavity is normally sized. Left ventricular wall thickness is normal. Left ventricular systolic function is normal with an ejection fraction of 73 % by Woody's method of disks.   2. Normal right ventricular cavity size, wall thickness and systolic function. Tricuspid annular plane systolic excursion (TAPSE) is 2.0 cm (normal >=1.7 cm).   3. Normal atria.   4. No significant valvular disease.   5. No pericardial effusion seen.   6. Agitated saline injection was negative for intracardiac shunt.   7. No prior echocardiogram is available for comparison.      -------------------------------------------------------------------------------------------  Assessment and Plan:   Ms. Dorsey is a 57 yo F w/ hx of vasospastic angina, asthma, recent PE on apixaban, as well as recent PNA. Presented from vascular clinic w/ SOB.  Found to have EKG w/ new TWI in V3-4 compared to prior, w/ normal hemodynamics, neg trop/BNP, and CTPA w/ no PE, but possible L lingua PNA vs atelectatsis. Cardiology was asked to comment on EKG changes and to help w/up her dyspnea.    1. TWI - non-specific and pt w/o CP w/ neg trop. Prior EKG from 2019 w/ TWI in V1-2, now noted to be V1-4. Overall, low suspicion for ischemia as cause, as pt has no sx of CP and neg w/up thus far.  2. Dysnpea - unclear etiology at this time, possibly related to CTPA findings of consolidation, unclear if more chronic process. TTE w/ bubble negative for shunt, w/ normal systolic and diastolic fx.     Recommendations:  - Continue home AC  - consider pulm eval for chronic dyspnea, possible infx vs inflammatory processes  - no further w/up from cardiology perspective at this time      Paolo Maxwell MD  Cardiology Fellow   Cardiology Progress Note  ------------------------------------------------------------------------------------------    SUBJECTIVE/EVENTS::  - Tele: Sinus tach during fever, otherwise no events  - Febrile o/n w/ chills and rigors    -------------------------------------------------------------------------------------------  ROS:  CV: chest pain (-), palpitation (-), orthopnea (-), PND (-), edema (-)  PULM: SOB (+), cough (+), wheezing (-), hemoptysis (-).   CONST: fever (+), chills (+) or fatigue (-)  GI: abdominal distension (-), abdominal pain (-) , nausea/vomiting (-), hematemesis, (-), melena (-), hematochezia (-)  : dysuria (-), frequency (-), hematuria (-).   NEURO: numbness (-), weakness (-), dizziness (-)  MSK: myalgia (-), joint pain (-)   SKIN: itching (-), rash (-)  HEENT:  visual changes (-); vertigo or throat pain (-);  neck stiffness (-)   Psych: change in mood (-), anxiety (-), depression (-)     All other review of systems is negative unless indicated above.   -------------------------------------------------------------------------------------------  VS:  T(F): 98.7 (10-05), Max: 101.6 (10-05)  HR: 90 (10-05) (72 - 123)  BP: 101/68 (10-05) (93/56 - 121/76)  RR: 18 (10-05)  SpO2: 96% (10-05)  I&O's Summary    ------------------------------------------------------------------------------------------  PHYSICAL EXAM:  GEN: NAD, sitting in bed  HEENT: NCAT, EOMI  CV: RRR, Ns1/s2, no m/r/g, JVP not elevated  RESP: CTA B/l, no w/r/r  ABD: soft, nt, nd  Ext: warm, 2+ pulses, no edema  Neuro: AAOx3, no focal deficits    -------------------------------------------------------------------------------------------  LABS:                          13.4   6.56  )-----------( 293      ( 04 Oct 2023 11:01 )             41.8     10-04    141  |  103  |  15  ----------------------------<  96  4.1   |  22  |  0.59    Ca    9.4      04 Oct 2023 11:01  Mg     2.1     10-04    TPro  7.4  /  Alb  4.6  /  TBili  0.6  /  DBili  x   /  AST  17  /  ALT  20  /  AlkPhos  47  10-04      CARDIAC MARKERS ( 04 Oct 2023 11:01 )  <6 ng/L / x     / x     / x     / x     / x          -------------------------------------------------------------------------------------------  Meds:  acetaminophen     Tablet .. 650 milliGRAM(s) Oral every 6 hours PRN  albuterol/ipratropium for Nebulization 3 milliLiter(s) Nebulizer every 6 hours  apixaban 5 milliGRAM(s) Oral every 12 hours  budesonide  80 MICROgram(s)/formoterol 4.5 MICROgram(s) Inhaler 2 Puff(s) Inhalation two times a day  guaiFENesin  milliGRAM(s) Oral every 12 hours  melatonin 3 milliGRAM(s) Oral at bedtime PRN  sertraline 150 milliGRAM(s) Oral daily  zolpidem 5 milliGRAM(s) Oral at bedtime PRN    -------------------------------------------------------------------------------------------  Cardiovascular Diagnostic Testing:  TTE 10/4  CONCLUSIONS:      1. Left ventricular cavity is normally sized. Left ventricular wall thickness is normal. Left ventricular systolic function is normal with an ejection fraction of 73 % by Woody's method of disks.   2. Normal right ventricular cavity size, wall thickness and systolic function. Tricuspid annular plane systolic excursion (TAPSE) is 2.0 cm (normal >=1.7 cm).   3. Normal atria.   4. No significant valvular disease.   5. No pericardial effusion seen.   6. Agitated saline injection was negative for intracardiac shunt.   7. No prior echocardiogram is available for comparison.      -------------------------------------------------------------------------------------------  Assessment and Plan:   Ms. Dorsey is a 57 yo F w/ hx of vasospastic angina, asthma, recent PE on apixaban, as well as recent PNA. Presented from vascular clinic w/ SOB.  Found to have EKG w/ new TWI in V3-4 compared to prior, w/ normal hemodynamics, neg trop/BNP, and CTPA w/ no PE, but possible L lingua PNA vs atelectatsis. Cardiology was asked to comment on EKG changes and to help w/up her dyspnea.    1. TWI - non-specific and pt w/o CP w/ neg trop. Prior EKG from 2019 w/ TWI in V1-2, now noted to be V1-4. Overall, low suspicion for ischemia as cause, as pt has no sx of CP and neg w/up thus far.  2. Dysnpea - unclear etiology at this time, possibly related to CTPA findings of consolidation, unclear if more chronic process. TTE w/ bubble negative for shunt, w/ normal systolic and diastolic fx.     Recommendations:  - Continue home AC  - consider pulm eval for chronic dyspnea, possible infx vs inflammatory processes  - no further w/up from cardiology perspective at this time    General Cardiology Consult service will signoff at this time. Please call back with any additional questions.    Paolo Maxwell MD  Cardiology Fellow   Cardiology Progress Note  ------------------------------------------------------------------------------------------    SUBJECTIVE/EVENTS::  - Tele: Sinus tach during fever, otherwise no events  - Febrile o/n w/ chills and rigors    -------------------------------------------------------------------------------------------  Meds:  acetaminophen     Tablet .. 650 milliGRAM(s) Oral every 6 hours PRN  albuterol/ipratropium for Nebulization 3 milliLiter(s) Nebulizer every 6 hours  apixaban 5 milliGRAM(s) Oral every 12 hours  budesonide  80 MICROgram(s)/formoterol 4.5 MICROgram(s) Inhaler 2 Puff(s) Inhalation two times a day  guaiFENesin  milliGRAM(s) Oral every 12 hours  melatonin 3 milliGRAM(s) Oral at bedtime PRN  sertraline 150 milliGRAM(s) Oral daily  zolpidem 5 milliGRAM(s) Oral at bedtime PRN  -------------------------------------------------------------------------------------------    ROS:  CV: chest pain (-), palpitation (-), orthopnea (-), PND (-), edema (-)  PULM: SOB (+), cough (+), wheezing (-), hemoptysis (-).   CONST: fever (+), chills (+) or fatigue (-)  GI: abdominal distension (-), abdominal pain (-) , nausea/vomiting (-), hematemesis, (-), melena (-), hematochezia (-)  : dysuria (-), frequency (-), hematuria (-).   NEURO: numbness (-), weakness (-), dizziness (-)  MSK: myalgia (-), joint pain (-)   SKIN: itching (-), rash (-)  HEENT:  visual changes (-); vertigo or throat pain (-);  neck stiffness (-)   Psych: change in mood (-), anxiety (-), depression (-)   All other review of systems is negative unless indicated above.     -------------------------------------------------------------------------------------------  VS:  T(F): 98.7 (10-05), Max: 101.6 (10-05)  HR: 90 (10-05) (72 - 123)  BP: 101/68 (10-05) (93/56 - 121/76)  RR: 18 (10-05)  SpO2: 96% (10-05)    ------------------------------------------------------------------------------------------  PHYSICAL EXAM:  GEN: NAD, sitting in bed  HEENT: NCAT, EOMI  CV: RRR, Ns1/s2, no m/r/g, JVP not elevated  RESP: CTA B/l, no w/r/r  ABD: soft, nt, nd  Ext: warm, 2+ pulses, no edema  Neuro: AAOx3, no focal deficits    -------------------------------------------------------------------------------------------  LABS:                     13.4   6.56  )-----------( 293      ( 04 Oct 2023 11:01 )             41.8     10-04  141  |  103  |  15  ----------------------------<  96  4.1   |  22  |  0.59    Ca    9.4      04 Oct 2023 11:01  Mg     2.1     10-04    TPro  7.4  /  Alb  4.6  /  TBili  0.6  /  DBili  x   /  AST  17  /  ALT  20  /  AlkPhos  47  10-04    Troponin T:   <6  ->  <6         -------------------------------------------------------------------------------------------  Cardiovascular Diagnostic Testing:  TTE 10/4  CONCLUSIONS:    1. Left ventricular cavity is normally sized. Left ventricular wall thickness is normal. Left ventricular systolic function is normal with an ejection fraction of 73 % by Woody's method of disks.   2. Normal right ventricular cavity size, wall thickness and systolic function. Tricuspid annular plane systolic excursion (TAPSE) is 2.0 cm (normal >=1.7 cm).   3. Normal atria.   4. No significant valvular disease.   5. No pericardial effusion seen.   6. Agitated saline injection was negative for intracardiac shunt.   7. No prior echocardiogram is available for comparison.          -------------------------------------------------------------------------------------------  Assessment:   Ms. Dorsey is a 57 yo F w/ hx of vasospastic angina, asthma, recent PE on apixaban, as well as recent PNA.   Presented from vascular clinic w/ SOB.  Found to have EKG w/ new TWI in V3-4 compared to prior, w/ normal hemodynamics, neg trop/BNP, and CTPA w/ no PE, but possible L lingua PNA vs atelectatsis.   Cardiology was asked to comment on EKG changes and to help w/up her dyspnea.    1. TWI - non-specific and pt w/o CP w/ neg trop. Prior EKG from 2019 w/ TWI in V1-2, now noted to be V1-4. Overall, low suspicion for ischemia as cause, as pt has no sx of CP and neg w/up thus far.  Trop T neg. x 2.  2. Dyspnea - unclear etiology at this time, possibly related to CTPA findings of consolidation, unclear if more chronic process. TTE w/ bubble negative for shunt and shows normal systolic and diastolic ventricular fx.       Recommendations:  - Continue home AC  - consider pulm eval for chronic dyspnea, possible infx vs inflammatory processes  - no further w/up from cardiology perspective at this time    General Cardiology Consult service will signoff at this time. Please call back with any additional questions.    Paolo Maxwell MD  Cardiology Fellow    Plan discussed with cardiology fellow.  Patient seen and examined.  Hx., exam and labs as above.  I agree with the assessment and recommendations, which I have reviewed and edited where appropriate.  Silver Leach M.D.  Cardiology Attending, Consult Service    For Cardiology consults and questions, all Cardiology service information can be found 24/7 on [email or web address] - use password: [email or web address] to log in.

## 2023-10-05 NOTE — CONSULT NOTE ADULT - ASSESSMENT
57 yo F with angina, PE, recent PNA, initially with SOB  Fever, no leukocytosis  Initially with SOB, here in hospital developed episode of rigors and fever  CT Chest with area of atelectasis, no PE  CXR clear  UA neg  RVP neg  No symptoms to suggest source of fever  Overall, Fever, shortness of breath  - Ertapenem 1g q 24  - F/U BCXs  - Check CT A/P (with contrast if able to tolerate  - Monitor for further fevers  - Monitor for alternate source infection    Juaquin Hanson MD  Contact on TEAMS messaging from 9am - 5pm  From 5pm-9am, on weekends, or if no response call [phone]
Cardiology Consult Note   [Please check [email or web address]: [email or web address]" for cardiology service schedule and contact information]    History of Present Illness:   Ms. Dorsey is a 59 yo F w/ hx of vasospastic angina, asthma, recent PE on apixaban, as well as recent PNA who presented from vascular clinic w/ SOB.    Per pt, shes been feeling SOB w/ chills, night sweats for the past 5d while in [place]. While there she was admitted for L PNA and dc'ed on ABx and prednisone. She flew back to [place] and felt more SOB and HERNANDEZ w/ coughing, lightheadedness, and fatigue. She went to vacular clinic where she was noted to be HDS w/ SpO2 95% on RA but appeared dysnpic so she was sent ot the ED. In the ED pt was HDS. EKG showed NSR w/ TWI in the septal/anterior precordial leads. CTPA showed no PE but L lingual opacity c/f atelectasis vs PNA. Labs were unremarkable including neg trop <6, neg BNP, normal lytes.    Prior EKGs were similar w/ TWI in V1-2.    Cardiology was asked to comment on the new TWI noted on EKG.      PMHx: reviewed, see below  SOCIAL HISTORY:  unchanged    MEDICATIONS:    REVIEW OF SYSTEMS:  CV: chest pain (-), palpitation (-), orthopnea (-), PND (-), edema (-)  PULM: SOB (-), cough (-), wheezing (-), hemoptysis (-).   CONST: fever (-), chills (-) or fatigue (-)  GI: abdominal distension (-), abdominal pain (-) , nausea/vomiting (-), hematemesis, (-), melena (-), hematochezia (-)  : dysuria (-), frequency (-), hematuria (-).   NEURO: numbness (-), weakness (-), dizziness (-)  SKIN: itching (-), rash (-)  HEENT:  visual changes (-); vertigo or throat pain (-);  neck stiffness (-)     All other review of systems is negative unless indicated above.   -------------------------------------------------------------------------------------------  PHYSICAL EXAM:  T(C): 37.1 (10-04-23 @ 11:59), Max: 37.1 (10-04-23 @ 09:40)  HR: 72 (10-04-23 @ 11:59) (72 - 73)  BP: 121/76 (10-04-23 @ 11:59) (114/82 - 121/76)  RR: 24 (10-04-23 @ 11:59) (22 - 24)  SpO2: 99% (10-04-23 @ 11:59) (98% - 99%)  Wt(kg): --  I&O's Summary      GEN: NAD, sitting in bed  HEENT: NCAT, EOMI  CV: RRR, Ns1/s2, no m/r/g, JVP not elevated  RESP: CTA B/l, no w/r/r  ABD: soft, nt, nd  Ext: warm, 2+ pulses, no edema  Neuro: AAOx3, no focal defcits    -------------------------------------------------------------------------------------------  LABS:                          13.4   6.56  )-----------( 293      ( 04 Oct 2023 11:01 )             41.8     10-04    141  |  103  |  15  ----------------------------<  96  4.1   |  22  |  0.59    Ca    9.4      04 Oct 2023 11:01  Mg     2.1     10-04    TPro  7.4  /  Alb  4.6  /  TBili  0.6  /  DBili  x   /  AST  17  /  ALT  20  /  AlkPhos  47  10-04      CARDIAC MARKERS ( 04 Oct 2023 11:01 )  <6 ng/L / x     / x     / x     / x     / x          -------------------------------------------------------------------------------------------  Meds:    -------------------------------------------------------------------------------------------  Cardiovascular Diagnostic Testing:      -------------------------------------------------------------------------------------------  Assessment and Plan:   Ms. Dorsey is a 59 yo F w/ hx of vasospastic angina, asthma, recent PE on apixaban, as well as recent PNA who presented from vascular clinic w/ SOB, found to have EKG w/ new TWI in V3-4 compared to prior, w/ normal hemodynamics, neg trop/BNP, and CTPA w/ no PE, but possible L lingua PNA vs atelectatsis. Cardiology was asked to comment on EKG changes.    1. TWI - non-specific and pt w/o CP w/ neg trop. Prior EKG from 2019 w/ TWI in V1-2, now noted to be V1-4. Overall, low suspicion for ischemia related as pt has no sx of CP, and neg w/up thus far.  2. Dysnpea - unclear etiology at this time, possibly related to CTPA findings of consolidation, unclear if more chronic process. From cardiology perspective, can get TTE to look for structural abnormalities, and assess for possible shunting.     Recommendations:  - Please get a formal TTE, w/ bubble study to assess for possible shunt  - r/p trop in 4-6hr, if negative can stop trending  - Continue home AC  - consider pulm eval for chronic dyspnea    *** Recommendations are preliminary until cosigned by the attending.    Paolo Maxwell MD  Cardiology Fellow    For all New Consults and Questions:  [email or web address]   Login: [employer]  
58 year old female with a history of [employer] exposure, asthma, GERD, recently diagnosed provoked PE (8/2023) presents with subacute history of dyspnea and intermittent hypoxia.     Unclear etiology of her dyspnea. Her CT is relatively benign apart from some notably atalectasis. Suspect her lingular density is atalectasis >consolidation. She has no white count and barely elevated procal with a benign CT, low suspicion this is typical or atypical pneumonia driving her symptoms which she describes as more subacte.   Considering her recent pneumonia with persistent faint wheeze, will extend her steroid course for a few days for reactive airways. Less likely explains her episodes of hypoxia.    ? if she has some sleep apnea considering her nocturnal symptoms. She will need a sleep study, but needs to be deferred to outpatient setting.     - Would obtain blood gas  - Continue with around the clock duoneb and standing symbicort 160 2 puffs BID  - Will extend steroids course for a few days considering persistent wheeze and cough, 40 mg pred  - Nocturnal pulse oximetry   - Continue full A/C with pred  - Ambulatory pulse oximetry  - ESR/CRP  - Sputum culture  - Antibiotics per primary team, reasonable to broaden considering fever

## 2023-10-05 NOTE — CONSULT NOTE ADULT - SUBJECTIVE AND OBJECTIVE BOX
HPI: 58 year old female with a history of [employer] exposure, asthma, GERD, recently diagnosed provoked PE (8/2023) presents with subacute history of dyspnea and intermittent hypoxia.     She reports symptoms started around mid August, she presented wt PE to a hospital in [place]. She was admitted for 3 days, treated with blood thinners and discharged. No other invasive procedures. Since discharge, reports episodes of dizziness, exertional dyspnea, and general malaise. She represented to the hospital last week, she was told she had pneumonia and was treated with 5 days of Levaquin and steroids (40 mg Pred) which she finished yesterday. She does not feel much better with steroids and prednisone. She reports her symptoms of dizziness and light headedness have worsened. She denies fevers, chills.     On presentation she spiked a fever to >101. She has been started on Ertapenem    PAST MEDICAL & SURGICAL HISTORY:  Chronic GERD  Seasonal allergies  Insomnia  Hypertrophy of breast  Malignant melanoma, unspecified site  S/P Shoulder Surgery  (Right with titanium anchors- 1/2006, 2007, 7/2008 & 11/2008)  S/P laparoscopic cholecystectomy  (2011)  S/P Mohs surgery for basal cell carcinoma  (Right side of face, 2018)    FAMILY HISTORY:  No pertinent family history in first degree relatives        SOCIAL HISTORY:  Never smoker  SOcial alcohol  Lives with   Travels between [place] and [facility]  Exposures: [facility]    ALELRGIES:  sulfamethoxazole (Anaphylaxis)  penicillin (Anaphylaxis)  shellfish (Other)      HOME MEDICATIONS:  Reviewed    REVIEW OF SYSTEMS:  Constitutional: No fevers or chills. No weight loss. + fatigue or generalised malaise.  Eyes: No itching or discharge from the eyes  ENT: No ear pain. No ear discharge. No nasal congestion. No post nasal drip. No epistaxis. No throat pain. No sore throat. No difficulty swallowing.   CV: No chest pain. No palpitations. + lightheadedness or dizziness.   Resp: No dyspnea at rest.+dyspnea on exertion. No orthopnea. N+ wheezing. No cough. No stridor. No sputum production. No chest pain with respiration.  GI: No nausea. No vomiting. No diarrhea.  MSK: No joint pain or pain in any extremities  Integumentary: No skin lesions. +pedal edema.  Neurological: No gross motor weakness. No sensory changes.    [x] All other systems negative  [ ] Unable to assess ROS because ________    OBJECTIVE:  ICU Vital Signs Last 24 Hrs  T(C): 36.9 (05 Oct 2023 11:09), Max: 38.7 (05 Oct 2023 00:47)  T(F): 98.4 (05 Oct 2023 11:09), Max: 101.6 (05 Oct 2023 00:47)  HR: 79 (05 Oct 2023 11:09) (79 - 123)  BP: 120/86 (05 Oct 2023 11:09) (93/56 - 120/86)  BP(mean): 86 (04 Oct 2023 19:30) (86 - 86)  ABP: --  ABP(mean): --  RR: 18 (05 Oct 2023 11:09) (18 - 22)  SpO2: 94% (05 Oct 2023 11:09) (94% - 99%)    O2 Parameters below as of 05 Oct 2023 11:09  Patient On (Oxygen Delivery Method): room air    PHYSICAL EXAM:  Well appearing  Normal work of breathing  No accessory muscle use  Normal RR  Faint end expiratory wheeze with forced expiration  Coughing with deep inspiration  No edema  RRR no MRG    HOSPITAL MEDICATIONS:  PULM MEDICATIONS  (STANDING):  albuterol/ipratropium for Nebulization 3 milliLiter(s) Nebulizer every 6 hours  apixaban 5 milliGRAM(s) Oral every 12 hours  budesonide 160 MICROgram(s)/formoterol 4.5 MICROgram(s) Inhaler 2 Puff(s) Inhalation two times a day  ertapenem  IVPB      predniSONE   Tablet 40 milliGRAM(s) Oral daily      LABS:                        11.5   9.79  )-----------( 248      ( 05 Oct 2023 07:16 )             34.8     10-05    138  |  102  |  13  ----------------------------<  103<H>  3.5   |  23  |  0.59    Ca    8.9      05 Oct 2023 07:13  Mg     2.1     10-04    TPro  7.4  /  Alb  4.6  /  TBili  0.6  /  DBili  x   /  AST  17  /  ALT  20  /  AlkPhos  47  10-04      Urinalysis Basic - ( 05 Oct 2023 07:13 )    Color: x / Appearance: x / SG: x / pH: x  Gluc: 103 mg/dL / Ketone: x  / Bili: x / Urobili: x   Blood: x / Protein: x / Nitrite: x   Leuk Esterase: x / RBC: x / WBC x   Sq Epi: x / Non Sq Epi: x / Bacteria: x      Arterial Blood Gas:  10-05 @ 13:55  7.42/42/115/27/99.3/2.4  ABG lactate: --    Venous Blood Gas:  10-04 @ 15:18  7.38/42/76/25/96.1  VBG Lactate: 1.2  Venous Blood Gas:  10-04 @ 11:00  7.35/49/44/27/71.4  VBG Lactate: 2.1      MICROBIOLOGY:   Sputum culture pending  Urine strep neg  Urine leg neg  Procal 0.47    RADIOLOGY:  [x] Reviewed and interpreted by me  CT chest 10/4: Bibasilar atalectasis, Lingular atalectasis  No LAD

## 2023-10-05 NOTE — CONSULT NOTE ADULT - SUBJECTIVE AND OBJECTIVE BOX
"HPI:  58Y F w/ hx of vasospastic angina, asthma, recent PE on apixaban, as well as recent PNA presents from vascular cards clinic w/ SOB and chest pain.   Pt has been in [place] where she has another house the past few months. Reports she was dx w/ a PE, for which she was started on apixaban. Since she's had exertional dysnpea, monitoring her amb SpO2 noting sometimes dropping below 70%. She was hospitalized recently in [place] for PNA and was treated w/ levofloxacin and steroids. She flew back to [place] and felt more SOB and HERNANDEZ w/ coughing, lightheadedness, chest pressure/heaviness and fatigue. She went to vacular clinic where she was noted to be HDS w/ SpO2 95% on RA but appeared dysnpic so she was sent to the ED. In the ED pt was HDS. EKG showed NSR w/ TWI in the septal/anterior precordial leads. CTPA showed no PE but L lingual opacity c/f atelectasis vs PNA. Labs were unremarkable including neg trop <6, neg BNP. She was seen by cardiology in ED who recommended  TTE, w/ bubble study to assess for possible shunt, repeat trops and pulm evaluation. Patient admitted to medicine for further evaluation.      (04 Oct 2023 15:25)"    Above reviewed. 59 yo F with angina, PE, recent PNA, initially with SOB  Patient recently dx and treated for PE  Patient has been vacationing in [place]  Here in hospital had episode of fever  No chest pain, no cough  No abd pain, no diarrhea  No dysuria, no pyuria  Some right flank pain  No other focal complaints    PAST MEDICAL & SURGICAL HISTORY:  Chronic GERD    Seasonal allergies    Insomnia    Hypertrophy of breast    Malignant melanoma, unspecified site  R arm  H/O Prinzmetal angina    S/P Shoulder Surgery  (Right with titanium anchors- 1/2006, 2007, 7/2008 & 11/2008)    S/P laparoscopic cholecystectomy  (2011)    S/P Mohs surgery for basal cell carcinoma  (Right side of face, 2018)    Allergies    sulfamethoxazole (Anaphylaxis)  penicillin (Anaphylaxis)  shellfish (Other)  Intolerances    ANTIMICROBIALS:  ertapenem  IVPB      OTHER MEDS:  acetaminophen     Tablet .. 650 milliGRAM(s) Oral every 6 hours PRN  albuterol/ipratropium for Nebulization 3 milliLiter(s) Nebulizer every 6 hours  apixaban 5 milliGRAM(s) Oral every 12 hours  benzonatate 100 milliGRAM(s) Oral every 8 hours PRN  budesonide 160 MICROgram(s)/formoterol 4.5 MICROgram(s) Inhaler 2 Puff(s) Inhalation two times a day  chlorhexidine 2% Cloths 1 Application(s) Topical <User Schedule>  guaifenesin/dextromethorphan Oral Liquid 10 milliLiter(s) Oral every 6 hours PRN  melatonin 3 milliGRAM(s) Oral at bedtime PRN  predniSONE   Tablet 40 milliGRAM(s) Oral daily  sertraline 150 milliGRAM(s) Oral daily  zolpidem 5 milliGRAM(s) Oral at bedtime PRN  zolpidem 5 milliGRAM(s) Oral at bedtime PRN    SOCIAL HISTORY: No toxic habits    FAMILY HISTORY:  No pertinent family history in first degree relatives relating to chief complaint    Drug Dosing Weight  Height (cm): 177.8 (04 Oct 2023 09:40)  Weight (kg): 79.4 (04 Oct 2023 09:40)  BMI (kg/m2): 25.1 (04 Oct 2023 09:40)  BSA (m2): 1.97 (04 Oct 2023 09:40)    PE:    Vital Signs Last 24 Hrs  T(C): 36.9 (05 Oct 2023 11:09), Max: 38.7 (05 Oct 2023 00:47)  T(F): 98.4 (05 Oct 2023 11:09), Max: 101.6 (05 Oct 2023 00:47)  HR: 79 (05 Oct 2023 11:09) (79 - 123)  BP: 120/86 (05 Oct 2023 11:09) (93/56 - 120/86)  BP(mean): 86 (04 Oct 2023 19:30) (86 - 86)  RR: 18 (05 Oct 2023 11:09) (18 - 22)  SpO2: 94% (05 Oct 2023 11:09) (94% - 99%)    Gen: AOx3, NAD, non-toxic  CV: S1+S2 normal, nontachycardic  Resp: Clear bilat, no resp distress, no crackles/wheezes  Abd: Soft, nontender, +BS  Ext: No LE edema, no wounds  : No Cardona  IV/Skin: No thrombophlebitis  Msk: No low back pain, no arthralgias, no joint swelling  Neuro: No sensory deficits, no motor deficits    LABS:                        11.5   9.79  )-----------( 248      ( 05 Oct 2023 07:16 )             34.8     10-05    138  |  102  |  13  ----------------------------<  103<H>  3.5   |  23  |  0.59    Ca    8.9      05 Oct 2023 07:13  Mg     2.1     10-04    TPro  7.4  /  Alb  4.6  /  TBili  0.6  /  DBili  x   /  AST  17  /  ALT  20  /  AlkPhos  47  10-04    Urinalysis Basic - ( 05 Oct 2023 07:13 )    Color: x / Appearance: x / SG: x / pH: x  Gluc: 103 mg/dL / Ketone: x  / Bili: x / Urobili: x   Blood: x / Protein: x / Nitrite: x   Leuk Esterase: x / RBC: x / WBC x   Sq Epi: x / Non Sq Epi: x / Bacteria: x    MICROBIOLOGY:    Rapid RVP Result: NotDetec (10-04 @ 12:48)    RADIOLOGY:    10/4 CT:    IMPRESSION:    No pulmonary embolism.    Lingular opacification may represent atelectasis or pneumonia. Recommend   CT chest follow-up in 3 months to ensure clearing.

## 2023-10-06 DIAGNOSIS — R07.89 OTHER CHEST PAIN: ICD-10-CM

## 2023-10-06 LAB
HCT VFR BLD CALC: 36.8 % — SIGNIFICANT CHANGE UP (ref 34.5–45)
HGB BLD-MCNC: 11.9 G/DL — SIGNIFICANT CHANGE UP (ref 11.5–15.5)
MCHC RBC-ENTMCNC: 29.5 PG — SIGNIFICANT CHANGE UP (ref 27–34)
MCHC RBC-ENTMCNC: 32.3 GM/DL — SIGNIFICANT CHANGE UP (ref 32–36)
MCV RBC AUTO: 91.3 FL — SIGNIFICANT CHANGE UP (ref 80–100)
MRSA PCR RESULT.: SIGNIFICANT CHANGE UP
NRBC # BLD: 0 /100 WBCS — SIGNIFICANT CHANGE UP (ref 0–0)
PLATELET # BLD AUTO: 277 K/UL — SIGNIFICANT CHANGE UP (ref 150–400)
PROCALCITONIN SERPL-MCNC: 0.3 NG/ML — HIGH (ref 0.02–0.1)
RBC # BLD: 4.03 M/UL — SIGNIFICANT CHANGE UP (ref 3.8–5.2)
RBC # FLD: 12.6 % — SIGNIFICANT CHANGE UP (ref 10.3–14.5)
S AUREUS DNA NOSE QL NAA+PROBE: DETECTED
TROPONIN T, HIGH SENSITIVITY RESULT: <6 NG/L — SIGNIFICANT CHANGE UP (ref 0–51)
WBC # BLD: 6.86 K/UL — SIGNIFICANT CHANGE UP (ref 3.8–10.5)
WBC # FLD AUTO: 6.86 K/UL — SIGNIFICANT CHANGE UP (ref 3.8–10.5)

## 2023-10-06 PROCEDURE — 99233 SBSQ HOSP IP/OBS HIGH 50: CPT

## 2023-10-06 PROCEDURE — 99231 SBSQ HOSP IP/OBS SF/LOW 25: CPT

## 2023-10-06 PROCEDURE — 99232 SBSQ HOSP IP/OBS MODERATE 35: CPT

## 2023-10-06 PROCEDURE — 93010 ELECTROCARDIOGRAM REPORT: CPT

## 2023-10-06 PROCEDURE — 74177 CT ABD & PELVIS W/CONTRAST: CPT | Mod: 26

## 2023-10-06 RX ORDER — IPRATROPIUM/ALBUTEROL SULFATE 18-103MCG
3 AEROSOL WITH ADAPTER (GRAM) INHALATION EVERY 6 HOURS
Refills: 0 | Status: DISCONTINUED | OUTPATIENT
Start: 2023-10-06 | End: 2023-10-09

## 2023-10-06 RX ORDER — CEFTRIAXONE 500 MG/1
1000 INJECTION, POWDER, FOR SOLUTION INTRAMUSCULAR; INTRAVENOUS EVERY 24 HOURS
Refills: 0 | Status: DISCONTINUED | OUTPATIENT
Start: 2023-10-07 | End: 2023-10-09

## 2023-10-06 RX ADMIN — Medication 3 MILLILITER(S): at 06:16

## 2023-10-06 RX ADMIN — APIXABAN 5 MILLIGRAM(S): 2.5 TABLET, FILM COATED ORAL at 17:07

## 2023-10-06 RX ADMIN — BUDESONIDE AND FORMOTEROL FUMARATE DIHYDRATE 2 PUFF(S): 160; 4.5 AEROSOL RESPIRATORY (INHALATION) at 21:08

## 2023-10-06 RX ADMIN — ZOLPIDEM TARTRATE 5 MILLIGRAM(S): 10 TABLET ORAL at 21:08

## 2023-10-06 RX ADMIN — CHLORHEXIDINE GLUCONATE 1 APPLICATION(S): 213 SOLUTION TOPICAL at 06:44

## 2023-10-06 RX ADMIN — APIXABAN 5 MILLIGRAM(S): 2.5 TABLET, FILM COATED ORAL at 06:16

## 2023-10-06 RX ADMIN — Medication 40 MILLIGRAM(S): at 06:16

## 2023-10-06 RX ADMIN — ZOLPIDEM TARTRATE 5 MILLIGRAM(S): 10 TABLET ORAL at 22:38

## 2023-10-06 RX ADMIN — Medication 3 MILLILITER(S): at 20:21

## 2023-10-06 RX ADMIN — SERTRALINE 150 MILLIGRAM(S): 25 TABLET, FILM COATED ORAL at 10:02

## 2023-10-06 RX ADMIN — ERTAPENEM SODIUM 120 MILLIGRAM(S): 1 INJECTION, POWDER, LYOPHILIZED, FOR SOLUTION INTRAMUSCULAR; INTRAVENOUS at 10:02

## 2023-10-06 RX ADMIN — Medication 3 MILLILITER(S): at 14:18

## 2023-10-06 RX ADMIN — BUDESONIDE AND FORMOTEROL FUMARATE DIHYDRATE 2 PUFF(S): 160; 4.5 AEROSOL RESPIRATORY (INHALATION) at 10:02

## 2023-10-06 NOTE — PROGRESS NOTE ADULT - TIME BILLING
time spent reviewing prior charts, meds, discussing plan with patient and consultants= 59 minutes
time spent reviewing prior charts, meds, discussing plan with patient and consultants= 56 minutes

## 2023-10-06 NOTE — PROGRESS NOTE ADULT - SUBJECTIVE AND OBJECTIVE BOX
CC: F/U for Fever    Saw/spoke to patient. No further fevers. Back at baseline, although complaining of dizziness.    Allergies  sulfamethoxazole (Anaphylaxis)  penicillin (Anaphylaxis)  shellfish (Other)    ANTIMICROBIALS:  ertapenem  IVPB    ertapenem  IVPB 1000 every 24 hours    PE:    Vital Signs Last 24 Hrs  T(C): 36.8 (06 Oct 2023 04:26), Max: 37.1 (05 Oct 2023 21:27)  T(F): 98.2 (06 Oct 2023 04:26), Max: 98.7 (05 Oct 2023 21:27)  HR: 81 (06 Oct 2023 04:26) (81 - 99)  BP: 113/78 (06 Oct 2023 04:26) (113/78 - 121/81)  RR: 18 (06 Oct 2023 04:26) (16 - 18)  SpO2: 98% (06 Oct 2023 04:26) (95% - 99%)    Gen: AOx3, NAD, non-toxic  CV: Nontachycardic  Resp: Breathing comfortably, RA  Abd: Soft, nontender  IV/Skin: No thrombophlebitis    LABS:                        11.9   6.86  )-----------( 277      ( 06 Oct 2023 04:57 )             36.8     10-05    138  |  102  |  13  ----------------------------<  103<H>  3.5   |  23  |  0.59    Ca    8.9      05 Oct 2023 07:13    Urinalysis Basic - ( 05 Oct 2023 07:13 )    Color: x / Appearance: x / SG: x / pH: x  Gluc: 103 mg/dL / Ketone: x  / Bili: x / Urobili: x   Blood: x / Protein: x / Nitrite: x   Leuk Esterase: x / RBC: x / WBC x   Sq Epi: x / Non Sq Epi: x / Bacteria: x    MICROBIOLOGY:    .Blood Blood  10-04-23   No growth at 24 hours  --  --    .Blood Blood  10-04-23   No growth at 24 hours  --  --    Rapid RVP Result: NotDetec (10-04 @ 12:48)    (otherwise reviewed)    RADIOLOGY:    10/6 CT:    IMPRESSION:  *  Redemonstrated lingular subpleural opacification, similar to previous   chest CT. Differential continues to include atelectasis and pneumonia.   Recommend follow-up imaging to ensure resolution.  *  No evidence of infection within the abdomen or pelvis.

## 2023-10-06 NOTE — PROGRESS NOTE ADULT - SUBJECTIVE AND OBJECTIVE BOX
Annette Douglass MD  Division of Hospital Medicine  Reachable on MS Teams    PROGRESS NOTE:     Patient is a 58y old  Female who presents with a chief complaint of CC: CP, SOB (06 Oct 2023 11:46)      SUBJECTIVE / OVERNIGHT EVENTS: No acute events overnight, seen and evaluated this AM after CT scan. Was able to sleep well, daughter was at bedside. Still having chest pressure, had a twinge of pressure on exam. Coughing has improved, but patient still complaining of dizziness especially with ambulation. Ambulatory pulse ox showed sat of 95% with ambulation off O2. Has not had BM in several days but notes that this is normal for her    ADDITIONAL REVIEW OF SYSTEMS:    MEDICATIONS  (STANDING):  apixaban 5 milliGRAM(s) Oral every 12 hours  budesonide 160 MICROgram(s)/formoterol 4.5 MICROgram(s) Inhaler 2 Puff(s) Inhalation two times a day  chlorhexidine 2% Cloths 1 Application(s) Topical <User Schedule>  predniSONE   Tablet 40 milliGRAM(s) Oral daily  sertraline 150 milliGRAM(s) Oral daily    MEDICATIONS  (PRN):  acetaminophen     Tablet .. 650 milliGRAM(s) Oral every 6 hours PRN Temp greater or equal to 38C (100.4F), Mild Pain (1 - 3)  albuterol/ipratropium for Nebulization 3 milliLiter(s) Nebulizer every 6 hours PRN Shortness of Breath and/or Wheezing  benzonatate 100 milliGRAM(s) Oral every 8 hours PRN Cough  guaifenesin/dextromethorphan Oral Liquid 10 milliLiter(s) Oral every 6 hours PRN Cough  melatonin 3 milliGRAM(s) Oral at bedtime PRN Insomnia  zolpidem 5 milliGRAM(s) Oral at bedtime PRN Insomnia  zolpidem 5 milliGRAM(s) Oral at bedtime PRN Insomnia      CAPILLARY BLOOD GLUCOSE        I&O's Summary    05 Oct 2023 07:01  -  06 Oct 2023 07:00  --------------------------------------------------------  IN: 480 mL / OUT: 0 mL / NET: 480 mL        PHYSICAL EXAM:  Vital Signs Last 24 Hrs  T(C): 36.8 (06 Oct 2023 04:26), Max: 37.1 (05 Oct 2023 21:27)  T(F): 98.2 (06 Oct 2023 04:26), Max: 98.7 (05 Oct 2023 21:27)  HR: 81 (06 Oct 2023 04:26) (81 - 99)  BP: 113/78 (06 Oct 2023 04:26) (113/78 - 121/81)  BP(mean): --  RR: 18 (06 Oct 2023 04:26) (16 - 18)  SpO2: 98% (06 Oct 2023 04:26) (95% - 99%)    Parameters below as of 06 Oct 2023 04:26  Patient On (Oxygen Delivery Method): room air        CONSTITUTIONAL: NAD, well-developed  RESPIRATORY: Normal respiratory effort; lungs are clear to auscultation bilaterally, coughing with deep inspiration  CARDIOVASCULAR: Regular rate and rhythm, normal S1 and S2, no murmur/rub/gallop; No lower extremity edema; Peripheral pulses are 2+ bilaterally  ABDOMEN: Nontender to palpation, normoactive bowel sounds, no rebound/guarding; No hepatosplenomegaly  MUSCLOSKELETAL: no clubbing or cyanosis of digits; no joint swelling or tenderness to palpation  PSYCH: A+O to person, place, and time; affect appropriate    LABS:                        11.9   6.86  )-----------( 277      ( 06 Oct 2023 04:57 )             36.8     10-05    138  |  102  |  13  ----------------------------<  103<H>  3.5   |  23  |  0.59    Ca    8.9      05 Oct 2023 07:13            Urinalysis Basic - ( 05 Oct 2023 07:13 )    Color: x / Appearance: x / SG: x / pH: x  Gluc: 103 mg/dL / Ketone: x  / Bili: x / Urobili: x   Blood: x / Protein: x / Nitrite: x   Leuk Esterase: x / RBC: x / WBC x   Sq Epi: x / Non Sq Epi: x / Bacteria: x        Culture - Blood (collected 04 Oct 2023 15:00)  Source: .Blood Blood  Preliminary Report (05 Oct 2023 18:02):    No growth at 24 hours    Culture - Blood (collected 04 Oct 2023 14:45)  Source: .Blood Blood  Preliminary Report (05 Oct 2023 18:02):    No growth at 24 hours        RADIOLOGY & ADDITIONAL TESTS:  Results Reviewed:   Imaging Personally Reviewed:  Electrocardiogram Personally Reviewed:  Telemetry: SR 70-100s     COORDINATION OF CARE:  Care Discussed with Consultants/Other Providers [Y/N]: Pulm, Cards   Prior or Outpatient Records Reviewed [Y/N]:

## 2023-10-06 NOTE — PROGRESS NOTE ADULT - SUBJECTIVE AND OBJECTIVE BOX
Interval Events:   Still with persistent chest tightness and discomfort  Dyspnea with minimal exertion  No wheeze, does not feel like her typical asthma exacerbations nd bronchitis    REVIEW OF SYSTEMS:  Denies fevers, chills, chest pain, palpiations, cough, shortness of breath  Negative unless stated above    OBJECTIVE:  ICU Vital Signs Last 24 Hrs  T(C): 36.8 (06 Oct 2023 04:26), Max: 37.1 (05 Oct 2023 21:27)  T(F): 98.2 (06 Oct 2023 04:26), Max: 98.7 (05 Oct 2023 21:27)  HR: 81 (06 Oct 2023 04:26) (81 - 99)  BP: 113/78 (06 Oct 2023 04:26) (113/78 - 121/81)  BP(mean): --  ABP: --  ABP(mean): --  RR: 18 (06 Oct 2023 04:26) (16 - 18)  SpO2: 98% (06 Oct 2023 04:26) (95% - 99%)    O2 Parameters below as of 06 Oct 2023 04:26  Patient On (Oxygen Delivery Method): room air              10-05 @ 07:01  -  10-06 @ 07:00  --------------------------------------------------------  IN: 480 mL / OUT: 0 mL / NET: 480 mL      CAPILLARY BLOOD GLUCOSE          PHYSICAL EXAM:  Well appearing  Normal work of breathing  No accessory muscle use  Normal RR  No wheeze  No edema  RRR no MRG      HOSPITAL MEDICATIONS:  MEDICATIONS  (STANDING):  albuterol/ipratropium for Nebulization 3 milliLiter(s) Nebulizer every 6 hours  apixaban 5 milliGRAM(s) Oral every 12 hours  budesonide 160 MICROgram(s)/formoterol 4.5 MICROgram(s) Inhaler 2 Puff(s) Inhalation two times a day  ertapenem  IVPB      ertapenem  IVPB 1000 milliGRAM(s) IV Intermittent every 24 hours  predniSONE   Tablet 40 milliGRAM(s) Oral daily      LABS:                        11.9   6.86  )-----------( 277      ( 06 Oct 2023 04:57 )             36.8     Hgb Trend: 11.9<--, 11.5<--, 13.4<--  10-05    138  |  102  |  13  ----------------------------<  103<H>  3.5   |  23  |  0.59    Ca    8.9      05 Oct 2023 07:13      Creatinine Trend: 0.59<--, 0.59<--    Urinalysis Basic - ( 05 Oct 2023 07:13 )    Color: x / Appearance: x / SG: x / pH: x  Gluc: 103 mg/dL / Ketone: x  / Bili: x / Urobili: x   Blood: x / Protein: x / Nitrite: x   Leuk Esterase: x / RBC: x / WBC x   Sq Epi: x / Non Sq Epi: x / Bacteria: x      Arterial Blood Gas:  10-05 @ 13:55  7.42/42/115/27/99.3/2.4  ABG lactate: --    Venous Blood Gas:  10-04 @ 15:18  7.38/42/76/25/96.1  VBG Lactate: 1.2    MICROBIOLOGY:     RADIOLOGY:  [x] Reviewed and interpreted by me  CT chest 10/4: Bibasilar atalectasis, Lingular atalectasis  No LAD

## 2023-10-07 LAB
ANION GAP SERPL CALC-SCNC: 13 MMOL/L — SIGNIFICANT CHANGE UP (ref 5–17)
BUN SERPL-MCNC: 11 MG/DL — SIGNIFICANT CHANGE UP (ref 7–23)
CALCIUM SERPL-MCNC: 9.3 MG/DL — SIGNIFICANT CHANGE UP (ref 8.4–10.5)
CHLORIDE SERPL-SCNC: 105 MMOL/L — SIGNIFICANT CHANGE UP (ref 96–108)
CO2 SERPL-SCNC: 25 MMOL/L — SIGNIFICANT CHANGE UP (ref 22–31)
CREAT SERPL-MCNC: 0.54 MG/DL — SIGNIFICANT CHANGE UP (ref 0.5–1.3)
EGFR: 108 ML/MIN/1.73M2 — SIGNIFICANT CHANGE UP
GLUCOSE SERPL-MCNC: 84 MG/DL — SIGNIFICANT CHANGE UP (ref 70–99)
HCT VFR BLD CALC: 36.3 % — SIGNIFICANT CHANGE UP (ref 34.5–45)
HGB BLD-MCNC: 11.8 G/DL — SIGNIFICANT CHANGE UP (ref 11.5–15.5)
MCHC RBC-ENTMCNC: 29.6 PG — SIGNIFICANT CHANGE UP (ref 27–34)
MCHC RBC-ENTMCNC: 32.5 GM/DL — SIGNIFICANT CHANGE UP (ref 32–36)
MCV RBC AUTO: 91 FL — SIGNIFICANT CHANGE UP (ref 80–100)
NRBC # BLD: 0 /100 WBCS — SIGNIFICANT CHANGE UP (ref 0–0)
PLATELET # BLD AUTO: 321 K/UL — SIGNIFICANT CHANGE UP (ref 150–400)
POTASSIUM SERPL-MCNC: 3.4 MMOL/L — LOW (ref 3.5–5.3)
POTASSIUM SERPL-SCNC: 3.4 MMOL/L — LOW (ref 3.5–5.3)
RBC # BLD: 3.99 M/UL — SIGNIFICANT CHANGE UP (ref 3.8–5.2)
RBC # FLD: 12.6 % — SIGNIFICANT CHANGE UP (ref 10.3–14.5)
SODIUM SERPL-SCNC: 143 MMOL/L — SIGNIFICANT CHANGE UP (ref 135–145)
WBC # BLD: 8.96 K/UL — SIGNIFICANT CHANGE UP (ref 3.8–10.5)
WBC # FLD AUTO: 8.96 K/UL — SIGNIFICANT CHANGE UP (ref 3.8–10.5)

## 2023-10-07 PROCEDURE — 99233 SBSQ HOSP IP/OBS HIGH 50: CPT

## 2023-10-07 PROCEDURE — 93010 ELECTROCARDIOGRAM REPORT: CPT

## 2023-10-07 RX ORDER — POTASSIUM CHLORIDE 20 MEQ
20 PACKET (EA) ORAL
Refills: 0 | Status: COMPLETED | OUTPATIENT
Start: 2023-10-07 | End: 2023-10-07

## 2023-10-07 RX ADMIN — ZOLPIDEM TARTRATE 5 MILLIGRAM(S): 10 TABLET ORAL at 21:30

## 2023-10-07 RX ADMIN — APIXABAN 5 MILLIGRAM(S): 2.5 TABLET, FILM COATED ORAL at 19:06

## 2023-10-07 RX ADMIN — CHLORHEXIDINE GLUCONATE 1 APPLICATION(S): 213 SOLUTION TOPICAL at 05:16

## 2023-10-07 RX ADMIN — Medication 3 MILLILITER(S): at 14:41

## 2023-10-07 RX ADMIN — ZOLPIDEM TARTRATE 5 MILLIGRAM(S): 10 TABLET ORAL at 22:35

## 2023-10-07 RX ADMIN — SERTRALINE 150 MILLIGRAM(S): 25 TABLET, FILM COATED ORAL at 11:15

## 2023-10-07 RX ADMIN — APIXABAN 5 MILLIGRAM(S): 2.5 TABLET, FILM COATED ORAL at 05:17

## 2023-10-07 RX ADMIN — Medication 20 MILLIEQUIVALENT(S): at 11:14

## 2023-10-07 RX ADMIN — BUDESONIDE AND FORMOTEROL FUMARATE DIHYDRATE 2 PUFF(S): 160; 4.5 AEROSOL RESPIRATORY (INHALATION) at 21:30

## 2023-10-07 RX ADMIN — Medication 650 MILLIGRAM(S): at 14:41

## 2023-10-07 RX ADMIN — Medication 3 MILLILITER(S): at 05:18

## 2023-10-07 RX ADMIN — Medication 650 MILLIGRAM(S): at 15:30

## 2023-10-07 RX ADMIN — BUDESONIDE AND FORMOTEROL FUMARATE DIHYDRATE 2 PUFF(S): 160; 4.5 AEROSOL RESPIRATORY (INHALATION) at 09:11

## 2023-10-07 RX ADMIN — CEFTRIAXONE 100 MILLIGRAM(S): 500 INJECTION, POWDER, FOR SOLUTION INTRAMUSCULAR; INTRAVENOUS at 09:10

## 2023-10-07 RX ADMIN — Medication 20 MILLIEQUIVALENT(S): at 09:10

## 2023-10-07 RX ADMIN — Medication 40 MILLIGRAM(S): at 05:17

## 2023-10-07 NOTE — PROVIDER CONTACT NOTE (OTHER) - ACTION/TREATMENT ORDERED:
ACP aware, EKG completed sent to provider and placed in chart, continue to monitor, endorsed to night RN

## 2023-10-07 NOTE — PROVIDER CONTACT NOTE (OTHER) - NAME OF MD/NP/PA/DO NOTIFIED:
· Monica Alvarez ACP

## 2023-10-07 NOTE — PROGRESS NOTE ADULT - SUBJECTIVE AND OBJECTIVE BOX
Patient is a 58y old  Female who presents with a chief complaint of CC: CP, SOB (07 Oct 2023 14:37)      SUBJECTIVE / OVERNIGHT EVENTS: Pt seen and examined. No acute events overnight. Pt denies cp, sob.    MEDICATIONS  (STANDING):  apixaban 5 milliGRAM(s) Oral every 12 hours  budesonide 160 MICROgram(s)/formoterol 4.5 MICROgram(s) Inhaler 2 Puff(s) Inhalation two times a day  cefTRIAXone   IVPB 1000 milliGRAM(s) IV Intermittent every 24 hours  chlorhexidine 2% Cloths 1 Application(s) Topical <User Schedule>  sertraline 150 milliGRAM(s) Oral daily    MEDICATIONS  (PRN):  acetaminophen     Tablet .. 650 milliGRAM(s) Oral every 6 hours PRN Temp greater or equal to 38C (100.4F), Mild Pain (1 - 3)  albuterol/ipratropium for Nebulization 3 milliLiter(s) Nebulizer every 6 hours PRN Shortness of Breath and/or Wheezing  benzonatate 100 milliGRAM(s) Oral every 8 hours PRN Cough  guaifenesin/dextromethorphan Oral Liquid 10 milliLiter(s) Oral every 6 hours PRN Cough  melatonin 3 milliGRAM(s) Oral at bedtime PRN Insomnia  zolpidem 5 milliGRAM(s) Oral at bedtime PRN Insomnia  zolpidem 5 milliGRAM(s) Oral at bedtime PRN Insomnia      Vital Signs Last 24 Hrs  T(C): 36.8 (07 Oct 2023 11:52), Max: 36.8 (07 Oct 2023 11:52)  T(F): 98.3 (07 Oct 2023 11:52), Max: 98.3 (07 Oct 2023 11:52)  HR: 91 (07 Oct 2023 11:52) (76 - 91)  BP: 117/77 (07 Oct 2023 11:52) (104/72 - 127/81)  BP(mean): --  RR: 18 (07 Oct 2023 11:52) (18 - 18)  SpO2: 96% (07 Oct 2023 11:52) (93% - 96%)    Parameters below as of 07 Oct 2023 11:52  Patient On (Oxygen Delivery Method): room air      CAPILLARY BLOOD GLUCOSE        I&O's Summary      PHYSICAL EXAM:  GENERAL: NAD, well-groomed  HEAD:  Atraumatic, Normocephalic  EYES:  conjunctiva and sclera clear  NECK: Supple, No JVD  CHEST/LUNG: Clear to auscultation bilaterally; No wheeze  HEART: Regular rate and rhythm; No murmurs, rubs, or gallops  ABDOMEN: Soft, Nontender, Nondistended; Bowel sounds present  EXTREMITIES:  2+ Peripheral Pulses, No clubbing, cyanosis, or edema  PSYCH: AAOx3  NEUROLOGY: non-focal  SKIN: No rashes or lesions    LABS:                        11.8   8.96  )-----------( 321      ( 07 Oct 2023 06:02 )             36.3     10-07    143  |  105  |  11  ----------------------------<  84  3.4<L>   |  25  |  0.54    Ca    9.3      07 Oct 2023 06:02            Urinalysis Basic - ( 07 Oct 2023 06:02 )    Color: x / Appearance: x / SG: x / pH: x  Gluc: 84 mg/dL / Ketone: x  / Bili: x / Urobili: x   Blood: x / Protein: x / Nitrite: x   Leuk Esterase: x / RBC: x / WBC x   Sq Epi: x / Non Sq Epi: x / Bacteria: x          Consultant(s) Notes Reviewed:  Cardiology, Pulm

## 2023-10-07 NOTE — CHART NOTE - NSCHARTNOTEFT_GEN_A_CORE
Medicine ACP note    CC; Brief episode of CP  Evaluated the patient at bedside, Patient sitting in bed, states that Mid Cp for few minutes, resolved   Denies HA, dizziness, SOB    Vital Signs Last 24 Hrs  T(C): 36.6 (07 Oct 2023 21:01), Max: 36.8 (07 Oct 2023 11:52)  T(F): 97.8 (07 Oct 2023 21:01), Max: 98.3 (07 Oct 2023 11:52)  HR: 78 (07 Oct 2023 21:01) (76 - 91)  BP: 131/86 (07 Oct 2023 21:01) (104/72 - 131/86)  BP(mean): --  RR: 18 (07 Oct 2023 21:01) (18 - 18)  SpO2: 95% (07 Oct 2023 21:01) (93% - 96%)    Parameters below as of 07 Oct 2023 21:01  Patient On (Oxygen Delivery Method): room air          58F w/ hx of vasospastic angina, asthma, recent PE on apixaban, as well as recent PNA presents from vascular cards clinic w/ persistent SOB and chest pain.     DX:	SOB; EKG w/ TWI V1-4, EF: 73%, CTA chest neg for PE, Trop neg, TTE: Nomall LV, No shunt, LE US: No DVT. No further w/u from Cards standpoint                  NST ordered by Hosp  	SOB - Unclear etiology of her dyspnea, Prednisone x 2 days till  & duonebs                  CTX for PNA    #Cp, briefly , resolved  12 lead EKG with SR, Medicine ACP note    CC; Brief episode of CP  Evaluated the patient at bedside, Patient sitting in bed, states that Mid Cp for few minutes, resolved   Denies HA, dizziness, SOB    Vital Signs Last 24 Hrs  T(C): 36.6 (07 Oct 2023 21:01), Max: 36.8 (07 Oct 2023 11:52)  T(F): 97.8 (07 Oct 2023 21:01), Max: 98.3 (07 Oct 2023 11:52)  HR: 78 (07 Oct 2023 21:01) (76 - 91)  BP: 131/86 (07 Oct 2023 21:01) (104/72 - 131/86)  BP(mean): --  RR: 18 (07 Oct 2023 21:01) (18 - 18)  SpO2: 95% (07 Oct 2023 21:01) (93% - 96%)    Parameters below as of 07 Oct 2023 21:01  Patient On (Oxygen Delivery Method): room air          58F w/ hx of vasospastic angina, asthma, recent PE on apixaban, as well as recent PNA presents from vascular cards clinic w/ persistent SOB and chest pain.     DX:	SOB; EKG w/ TWI V1-4, EF: 73%, CTA chest neg for PE, Trop neg, TTE: Nomall LV, No shunt, LE US: No DVT. No further w/u from Cards standpoint                  NST ordered by Hosp  	SOB - Unclear etiology of her dyspnea, Prednisone x 2 days till  & duonebs                  CTX for PNA    #Cp, briefly , resolved  12 lead EKG with SR, no acute ST/T cahnges  Patient doesn't want any analgesia now  ST pending tomorrow  Will continue to monitor  Trop, repeat 12 lead EKG if CP reocurs  Will continue to monitor    Jennifer Cruz FNP BC, AGAP BC  [id number]

## 2023-10-08 LAB
ALBUMIN SERPL ELPH-MCNC: 4.1 G/DL — SIGNIFICANT CHANGE UP (ref 3.3–5)
ALP SERPL-CCNC: 38 U/L — LOW (ref 40–120)
ALT FLD-CCNC: 22 U/L — SIGNIFICANT CHANGE UP (ref 10–45)
ANION GAP SERPL CALC-SCNC: 14 MMOL/L — SIGNIFICANT CHANGE UP (ref 5–17)
AST SERPL-CCNC: 17 U/L — SIGNIFICANT CHANGE UP (ref 10–40)
BILIRUB SERPL-MCNC: 0.6 MG/DL — SIGNIFICANT CHANGE UP (ref 0.2–1.2)
BUN SERPL-MCNC: 12 MG/DL — SIGNIFICANT CHANGE UP (ref 7–23)
CALCIUM SERPL-MCNC: 9.4 MG/DL — SIGNIFICANT CHANGE UP (ref 8.4–10.5)
CHLORIDE SERPL-SCNC: 105 MMOL/L — SIGNIFICANT CHANGE UP (ref 96–108)
CO2 SERPL-SCNC: 23 MMOL/L — SIGNIFICANT CHANGE UP (ref 22–31)
CREAT SERPL-MCNC: 0.58 MG/DL — SIGNIFICANT CHANGE UP (ref 0.5–1.3)
EGFR: 106 ML/MIN/1.73M2 — SIGNIFICANT CHANGE UP
GLUCOSE SERPL-MCNC: 87 MG/DL — SIGNIFICANT CHANGE UP (ref 70–99)
HCT VFR BLD CALC: 36 % — SIGNIFICANT CHANGE UP (ref 34.5–45)
HGB BLD-MCNC: 11.8 G/DL — SIGNIFICANT CHANGE UP (ref 11.5–15.5)
MCHC RBC-ENTMCNC: 29.7 PG — SIGNIFICANT CHANGE UP (ref 27–34)
MCHC RBC-ENTMCNC: 32.8 GM/DL — SIGNIFICANT CHANGE UP (ref 32–36)
MCV RBC AUTO: 90.7 FL — SIGNIFICANT CHANGE UP (ref 80–100)
NRBC # BLD: 0 /100 WBCS — SIGNIFICANT CHANGE UP (ref 0–0)
PLATELET # BLD AUTO: 320 K/UL — SIGNIFICANT CHANGE UP (ref 150–400)
POTASSIUM SERPL-MCNC: 4.2 MMOL/L — SIGNIFICANT CHANGE UP (ref 3.5–5.3)
POTASSIUM SERPL-SCNC: 4.2 MMOL/L — SIGNIFICANT CHANGE UP (ref 3.5–5.3)
PROT SERPL-MCNC: 6.4 G/DL — SIGNIFICANT CHANGE UP (ref 6–8.3)
RBC # BLD: 3.97 M/UL — SIGNIFICANT CHANGE UP (ref 3.8–5.2)
RBC # FLD: 12.8 % — SIGNIFICANT CHANGE UP (ref 10.3–14.5)
SODIUM SERPL-SCNC: 142 MMOL/L — SIGNIFICANT CHANGE UP (ref 135–145)
WBC # BLD: 8.43 K/UL — SIGNIFICANT CHANGE UP (ref 3.8–10.5)
WBC # FLD AUTO: 8.43 K/UL — SIGNIFICANT CHANGE UP (ref 3.8–10.5)

## 2023-10-08 PROCEDURE — 93018 CV STRESS TEST I&R ONLY: CPT

## 2023-10-08 PROCEDURE — 78452 HT MUSCLE IMAGE SPECT MULT: CPT | Mod: 26

## 2023-10-08 PROCEDURE — 93016 CV STRESS TEST SUPVJ ONLY: CPT

## 2023-10-08 PROCEDURE — 99233 SBSQ HOSP IP/OBS HIGH 50: CPT

## 2023-10-08 RX ADMIN — BUDESONIDE AND FORMOTEROL FUMARATE DIHYDRATE 2 PUFF(S): 160; 4.5 AEROSOL RESPIRATORY (INHALATION) at 10:29

## 2023-10-08 RX ADMIN — Medication 3 MILLILITER(S): at 17:56

## 2023-10-08 RX ADMIN — Medication 3 MILLILITER(S): at 12:36

## 2023-10-08 RX ADMIN — CEFTRIAXONE 100 MILLIGRAM(S): 500 INJECTION, POWDER, FOR SOLUTION INTRAMUSCULAR; INTRAVENOUS at 10:29

## 2023-10-08 RX ADMIN — BUDESONIDE AND FORMOTEROL FUMARATE DIHYDRATE 2 PUFF(S): 160; 4.5 AEROSOL RESPIRATORY (INHALATION) at 21:01

## 2023-10-08 RX ADMIN — SERTRALINE 150 MILLIGRAM(S): 25 TABLET, FILM COATED ORAL at 12:36

## 2023-10-08 RX ADMIN — APIXABAN 5 MILLIGRAM(S): 2.5 TABLET, FILM COATED ORAL at 17:57

## 2023-10-08 RX ADMIN — CHLORHEXIDINE GLUCONATE 1 APPLICATION(S): 213 SOLUTION TOPICAL at 06:02

## 2023-10-08 RX ADMIN — ZOLPIDEM TARTRATE 5 MILLIGRAM(S): 10 TABLET ORAL at 21:00

## 2023-10-08 RX ADMIN — APIXABAN 5 MILLIGRAM(S): 2.5 TABLET, FILM COATED ORAL at 06:01

## 2023-10-08 RX ADMIN — ZOLPIDEM TARTRATE 5 MILLIGRAM(S): 10 TABLET ORAL at 21:54

## 2023-10-08 NOTE — PROGRESS NOTE ADULT - NSPROGADDITIONALINFOA_GEN_ALL_CORE
Plan d/w ACP Charley, discussed with Dr. Moreno from Pulmonary. Family updated at bedside
Family updated at bedside, discussed plan with Nick Colunga, and Cards
time spent reviewing prior charts, meds, discussing plan with patient=  50 minutes    d/w ACP Monica
time spent reviewing prior charts, meds, discussing plan with patient=  51 minutes    d/w ACP Irma

## 2023-10-08 NOTE — PROGRESS NOTE ADULT - PROBLEM SELECTOR PLAN 4
-diagnosed in August likely in the setting of long distance travel   -C/w Eliquis 5mg BID  -LE duplex neg for DVT
Hx of WTC exposure, asthma. During hospitalization at OSH, was prescribed prednisone taper. She is currently on RA. Lungs CTA b/l; no wheezes. Not in an acute asthma exacerbation.   -inhalers as per above
-diagnosed in August likely in the setting of long distance travel   -C/w Eliquis 5mg BID  -LE duplex neg for DVT
-diagnosed in August likely in the setting of long distance travel   -C/w Eliquis 5mg BID  -LE duplex neg for DVT

## 2023-10-08 NOTE — PROGRESS NOTE ADULT - SUBJECTIVE AND OBJECTIVE BOX
Patient is a 58y old  Female who presents with a chief complaint of CC: CP, SOB (08 Oct 2023 13:43)      SUBJECTIVE / OVERNIGHT EVENTS: Pt seen and examined. No acute events overnight. She denies SOB at this time. Reported wheezing earlier this AM. Pt is s/p nuclear stress test; results pending.    MEDICATIONS  (STANDING):  apixaban 5 milliGRAM(s) Oral every 12 hours  budesonide 160 MICROgram(s)/formoterol 4.5 MICROgram(s) Inhaler 2 Puff(s) Inhalation two times a day  cefTRIAXone   IVPB 1000 milliGRAM(s) IV Intermittent every 24 hours  chlorhexidine 2% Cloths 1 Application(s) Topical <User Schedule>  sertraline 150 milliGRAM(s) Oral daily    MEDICATIONS  (PRN):  acetaminophen     Tablet .. 650 milliGRAM(s) Oral every 6 hours PRN Temp greater or equal to 38C (100.4F), Mild Pain (1 - 3)  albuterol/ipratropium for Nebulization 3 milliLiter(s) Nebulizer every 6 hours PRN Shortness of Breath and/or Wheezing  benzonatate 100 milliGRAM(s) Oral every 8 hours PRN Cough  guaifenesin/dextromethorphan Oral Liquid 10 milliLiter(s) Oral every 6 hours PRN Cough  melatonin 3 milliGRAM(s) Oral at bedtime PRN Insomnia  zolpidem 5 milliGRAM(s) Oral at bedtime PRN Insomnia  zolpidem 5 milliGRAM(s) Oral at bedtime PRN Insomnia      Vital Signs Last 24 Hrs  T(C): 36.5 (08 Oct 2023 11:23), Max: 36.7 (08 Oct 2023 04:00)  T(F): 97.7 (08 Oct 2023 11:23), Max: 98 (08 Oct 2023 04:00)  HR: 87 (08 Oct 2023 11:23) (78 - 87)  BP: 105/70 (08 Oct 2023 11:23) (105/70 - 131/86)  BP(mean): --  RR: 18 (08 Oct 2023 11:23) (17 - 18)  SpO2: 97% (08 Oct 2023 11:23) (94% - 97%)    Parameters below as of 08 Oct 2023 11:23  Patient On (Oxygen Delivery Method): room air      CAPILLARY BLOOD GLUCOSE        I&O's Summary    07 Oct 2023 07:01  -  08 Oct 2023 07:00  --------------------------------------------------------  IN: 160 mL / OUT: 0 mL / NET: 160 mL    08 Oct 2023 07:01  -  08 Oct 2023 14:17  --------------------------------------------------------  IN: 480 mL / OUT: 0 mL / NET: 480 mL        PHYSICAL EXAM:  GENERAL: NAD, well-groomed  HEAD:  Atraumatic, Normocephalic  EYES: EOMI, PERRLA, conjunctiva and sclera clear  NECK: Supple, No JVD  CHEST/LUNG: Clear to auscultation bilaterally; No wheeze  HEART: Regular rate and rhythm; No murmurs, rubs, or gallops  ABDOMEN: Soft, Nontender, Nondistended; Bowel sounds present  EXTREMITIES:  2+ Peripheral Pulses, No clubbing, cyanosis, or edema  PSYCH: AAOx3  NEUROLOGY: non-focal  SKIN: No rashes or lesions    LABS:                        11.8   8.43  )-----------( 320      ( 08 Oct 2023 06:15 )             36.0     10-08    142  |  105  |  12  ----------------------------<  87  4.2   |  23  |  0.58    Ca    9.4      08 Oct 2023 06:12    TPro  6.4  /  Alb  4.1  /  TBili  0.6  /  DBili  x   /  AST  17  /  ALT  22  /  AlkPhos  38<L>  10-08          Urinalysis Basic - ( 08 Oct 2023 06:12 )    Color: x / Appearance: x / SG: x / pH: x  Gluc: 87 mg/dL / Ketone: x  / Bili: x / Urobili: x   Blood: x / Protein: x / Nitrite: x   Leuk Esterase: x / RBC: x / WBC x   Sq Epi: x / Non Sq Epi: x / Bacteria: x

## 2023-10-08 NOTE — PROGRESS NOTE ADULT - PROBLEM SELECTOR PLAN 1
-10/5 overnight patient spiked temp to 101.6 with associated tachycardia to 123 and BP 93/56, no further episodes  of fevers   -Unclear source of potential infection, RVP negative on admission   -Blood cultures neg so far  -UA negative  -CT Chest reviewed by pulm, lower concern for PNA and more consistent with atelectasis   -ID consulted given concern for infectious vs inflammatory process driving fever  -given anaphylaxis to PCN documented in chart and confirmed by patient, ertapenem was initially started, narrowed to CTX today, appreciate ID input. Can dc on Ceftin 500 q12h to complete 7 day course but can cont CTX while inpatient  -CT A/P with contrast performed with no obvious source of infection noted.
-Overnight patient spiked temp to 101.6 with associated tachycardia to 123 and BP 93/56  -Unclear source of potential infection, RVP negative on admission yesterday  -Blood cultures pending  -UA negative  -CT Chest reviewed by pulm, lower concern for PNA and more consistent with atelectasis   -ID consulted given concern for infectious vs inflammatory process driving fever  -given anaphylaxis to PCN documented in chart and confirmed by patient, will start ertapenem empirically pending cultures   -f/u MRSA swab given recent hospitalization
-10/5 overnight patient spiked temp to 101.6 with associated tachycardia to 123 and BP 93/56, no further episodes  of fevers   -Unclear source of potential infection, RVP negative on admission   -Blood cultures neg so far  -UA negative  -CT Chest reviewed by pulm, lower concern for PNA and more consistent with atelectasis   -ID consulted given concern for infectious vs inflammatory process driving fever  -given anaphylaxis to PCN documented in chart and confirmed by patient, ertapenem was initially started, narrowed to CTX today, appreciate ID input. Can dc on Ceftin 500 q12h to complete 7 day course but can cont CTX while inpatient  -CT A/P with contrast performed with no obvious source of infection noted.
-10/5; patient spiked temp to 101.6 with associated tachycardia to 123 and BP 93/56, no further episodes  of fevers   -Unclear source of potential infection, RVP negative on admission   -Blood cultures neg so far  -UA negative  -CT Chest reviewed by pulm, lower concern for PNA and more consistent with atelectasis   -ID consulted given concern for infectious vs inflammatory process driving fever  -given anaphylaxis to PCN documented in chart and confirmed by patient, ertapenem was initially started, narrowed to CTX today, appreciate ID input. Can dc on Ceftin 500 q12h to complete 7 day course but can cont CTX while inpatient  -CT A/P with contrast performed with no obvious source of infection noted.

## 2023-10-08 NOTE — PROGRESS NOTE ADULT - PROBLEM SELECTOR PLAN 2
-Pt with hx of Prinzmetal angina, reports that she has chest pressure today with some acute twinges. No events on tele, f/u EKG, trop neg  -TTE w/ bubble normal w/ no WMA  -con't monitor on telemetry  -given dizziness and persistent SOB/chest pressure will obtain Nuclear Stress test-discussed with Cards fellow  -if abnormal stress please reconsult Cards  -given concurrent dizziness, obtain orthostatics
-p/w CP and SOB, hx of vasospastic angina, WTC exposure, asthma, recent PE on Eliquis p/w persistent cough, HERNANDEZ, CP. Currently not hypoxic though reports low sat readings as outpatient   - CTA Chest: No PE.   -EKG: NSR w/ TWI in the septal/anterior precordial leads, Seen by cards, trops neg x 2, low suspicion for ischemia and no further workup planned. Continue to monitor on telemetry  -RVP/COVID neg   -TTE with bubble neg for shunt  -Seen by pulm-concern for underlying pulmonary process, CT Chest reviewed, more consistent with atelectasis than PNA, f/u pulm note   -Increased symbicort to 160 2 puffs BID, standing duonebs, additional 2 days of prednisone 40 (through 10/6)  -ABG on room air obtained with good sat  -monitor on continuous pulse ox  -obtain ambulatory pulse ox   -cough management with robitussin DM, tessalon for now, if not helping then can try hycodan though would try to avoid given patient takes ambien at [facility]
-Pt with hx of Prinzmetal angina, reports that she has chest pressure today with some acute twinges. No events on tele, f/u EKG, trop neg  -TTE w/ bubble normal w/ no WMA  -con't monitor on telemetry  -given dizziness and persistent SOB/chest pressure will obtain Nuclear Stress test-discussed with Cards fellow  -if abnormal stress please reconsult Cards  -given concurrent dizziness, obtain orthostatics
-Pt with hx of Prinzmetal angina, reports that she has chest pressure today with some acute twinges. No events on tele, f/u EKG, trop neg  -TTE w/ bubble normal w/ no WMA  -con't monitor on telemetry  -given dizziness and persistent SOB/chest pressure sent for Nuclear Stress test today 10/8  -if abnormal stress please reconsult Cards  - reports improvement in dizziness ; orthostatic negative

## 2023-10-08 NOTE — PROGRESS NOTE ADULT - PROBLEM SELECTOR PLAN 7
DVT ppx: on Eliquis for PE  On ambien 10mg qhs at home, I-stop performed, see chart note   Dispo: pending improvement in clinical condition

## 2023-10-08 NOTE — PROGRESS NOTE ADULT - ASSESSMENT
57 yo F with angina, PE, recent PNA, initially with SOB  Fever, no leukocytosis  Initially with SOB, here in hospital developed episode of rigors and fever  CT Chest with area of atelectasis, no PE  CXR clear  UA neg  RVP neg  BCXs x 2 NGTD  No symptoms to suggest source of fever  CT A/P reassuring  Only finding on workup is lung finding with possible pneumonia vs atelectasis, with no other source fever, complete treatment for PNA  Overall, Fever, shortness of breath  - Ceftriaxone 1g q 24, when DC planning and improved, would send out on Ceftin 500mg q 12 to complete 7 days (presuming continued improvement and no new culture data)--continue ceftriaxone while inpatient  - Aware of PCN allergy, patient states has tolerated Keflex in the past; monitor on first dose  - F/U BCXs  - Monitor for further fevers  - Monitor for alternate source infection    My colleagues will be covering this patient starting on 10/7/23, I will return 10/10. Please call [phone] or on call fellow with any questions or change in status.     Juaquin Hanson MD  Contact on TEAMS messaging from 9am - 5pm  From 5pm-9am, on weekends, or if no response call [phone]
58 year old female with a history of [employer] exposure, asthma, GERD, recently diagnosed provoked PE (8/2023) presents with subacute history of dyspnea, chest tightness, and intermittent hypoxia.     Unclear etiology of her dyspnea. Her CT is relatively benign apart from some atalectasis. Suspect her lingular density is atalectasis >consolidation. She has no white count and barely elevated procal with a benign CT, low suspicion this is typical or atypical pneumonia driving her symptoms which she describes as more subacute. Regardless, she is on ertapenem.   Considering her airways disease, recent hospitalization for pneumonia, will extended her steroid course. She is not wheezing on exam today, and subjectively this feels nothing like when she has prior flares of her airways disease. Low suspicion that this is an exacerbation of her asthma.  She is not hypoxic at rest confirmed with ABG on room air and remained >95% on room air during ambulatory pulse oximetry.  ? if this is a primary pulmonary etiology of her symptoms    - Can adjust duoneb to q 6 PRN and continue standing symbicort 160 2 puffs BID  - Will extend steroids course for a few days considering persistent wheeze and cough, 40 mg pred x 2 days (end date today--which would be 7 days total)  - Appreciate cardiology input with this challenging case  - Continue full A/C   - Sputum culture  - Antibiotics per ID      
58Y F w/ hx of vasospastic angina, asthma, recent PE on apixaban, as well as recent PNA presents from vascular cards clinic w/ persistent SOB and chest pressure. 
58Y F w/ hx of vasospastic angina, asthma, recent PE on apixaban, as well as recent PNA presents from vascular cards clinic w/ persistent SOB and chest pain. 
58Y F w/ hx of vasospastic angina, asthma, recent PE on apixaban, as well as recent PNA presents from vascular cards clinic w/ persistent SOB and chest pressure. 
58Y F w/ hx of vasospastic angina, asthma, recent PE on apixaban, as well as recent PNA presents from vascular cards clinic w/ persistent SOB and chest pressure.

## 2023-10-08 NOTE — PROGRESS NOTE ADULT - PROBLEM SELECTOR PROBLEM 1
Systemic inflammatory response syndrome (SIRS)

## 2023-10-08 NOTE — PROGRESS NOTE ADULT - PROBLEM SELECTOR PLAN 5
Hx of WTC exposure, asthma. During hospitalization at OSH, was prescribed prednisone taper. She is currently on RA. Lungs CTA b/l; no wheezes. Not in an acute asthma exacerbation.   -inhalers as per above
Recent hospitalization at OSH where she was dx with PNA. Treated with course of abx. Last day of levofloxacin 10/4.  -Lingular opacification seen on CT Chest. Recommend CT chest follow-up in 3 months

## 2023-10-08 NOTE — PROGRESS NOTE ADULT - REASON FOR ADMISSION
CC: CP, SOB
<-- Click to add NO significant Past Surgical History

## 2023-10-08 NOTE — PROGRESS NOTE ADULT - PROBLEM SELECTOR PLAN 6
Recent hospitalization at OSH where she was dx with PNA. Treated with course of abx. Last day of levofloxacin 10/4.  -Lingular opacification seen on CT Chest. Recommend CT chest follow-up in 3 months
DVT ppx: on Eliquis for PE  On ambien 10mg qhs at home, I-stop performed, see chart note   Dispo: pending improvement in clinical condition
Recent hospitalization at OSH where she was dx with PNA. Treated with course of abx. Last day of levofloxacin 10/4.  -Lingular opacification seen on CT Chest. Recommend CT chest follow-up in 3 months
Recent hospitalization at OSH where she was dx with PNA. Treated with course of abx. Last day of levofloxacin 10/4.  -Lingular opacification seen on CT Chest. Recommend CT chest follow-up in 3 months

## 2023-10-08 NOTE — PROGRESS NOTE ADULT - PROBLEM SELECTOR PLAN 3
-p/w CP and SOB, hx of vasospastic angina, WTC exposure, asthma, recent PE on Eliquis p/w persistent cough, HERNANDEZ, CP. Currently not hypoxic though reports low sat readings as outpatient   - CTA Chest: No PE.   -EKG: NSR w/ TWI in the septal/anterior precordial leads, Seen by cards, trops neg x 2, low suspicion for ischemia and no further workup planned. Continue to monitor on telemetry  -RVP/COVID neg   -TTE with bubble neg for shunt  -Seen by pulm-concern for underlying pulmonary process, CT Chest reviewed, more consistent with atelectasis than PNA, appreciate pulm recs    -c/w symbicort 160 2 puffs BID, duonebs PRN, additional 2 days of prednisone 40 (through 10/6) to complete 7 day course of steroids   -ABG on room air obtained with good sat  -monitor on continuous pulse ox  -amb pulse ox 95% on room air   -cough management with robitussin DM, tessalon for now, if not helping then can try hycodan though would try to avoid given patient takes ambien at night  - planned for nuclear stress test tomorrow 10/8
-p/w CP and SOB, hx of vasospastic angina, WTC exposure, asthma, recent PE on Eliquis p/w persistent cough, HERNANDEZ, CP. Currently not hypoxic though reports low sat readings as outpatient   - CTA Chest: No PE.   -EKG: NSR w/ TWI in the septal/anterior precordial leads, Seen by cards, trops neg x 2, low suspicion for ischemia and no further workup planned. Continue to monitor on telemetry  -RVP/COVID neg   -TTE with bubble neg for shunt  -Seen by pulm-concern for underlying pulmonary process, CT Chest reviewed, more consistent with atelectasis than PNA, appreciate pulm recs    -c/w symbicort 160 2 puffs BID, duonebs PRN, additional 2 days of prednisone 40 (through 10/6) to complete 7 day course of steroids   -ABG on room air obtained with good sat  -monitor on continuous pulse ox  -amb pulse ox 95% on room air   -cough management with robitussin DM, tessalon for now, if not helping then can try hycodan though would try to avoid given patient takes ambien at night  - s/p  nuclear stress test ; f/up report
-diagnosed in August likely in the setting of long distance travel   -C/w Eliquis 5mg BID  -LE duplex neg for DVT
-p/w CP and SOB, hx of vasospastic angina, WTC exposure, asthma, recent PE on Eliquis p/w persistent cough, HERNANDEZ, CP. Currently not hypoxic though reports low sat readings as outpatient   - CTA Chest: No PE.   -EKG: NSR w/ TWI in the septal/anterior precordial leads, Seen by cards, trops neg x 2, low suspicion for ischemia and no further workup planned. Continue to monitor on telemetry  -RVP/COVID neg   -TTE with bubble neg for shunt  -Seen by pulm-concern for underlying pulmonary process, CT Chest reviewed, more consistent with atelectasis than PNA, appreciate pulm recs    -c/w symbicort 160 2 puffs BID, duonebs PRN, additional 2 days of prednisone 40 (through 10/6) to complete 7 day course of steroids   -ABG on room air obtained with good sat  -monitor on continuous pulse ox  -amb pulse ox 95% on room air   -cough management with robitussin DM, tessalon for now, if not helping then can try hycodan though would try to avoid given patient takes ambien at [facility]

## 2023-10-09 ENCOUNTER — TRANSCRIPTION ENCOUNTER (OUTPATIENT)
Age: 58
End: 2023-10-09

## 2023-10-09 VITALS
SYSTOLIC BLOOD PRESSURE: 111 MMHG | HEART RATE: 74 BPM | DIASTOLIC BLOOD PRESSURE: 74 MMHG | TEMPERATURE: 99 F | OXYGEN SATURATION: 92 % | RESPIRATION RATE: 18 BRPM

## 2023-10-09 PROCEDURE — A9500: CPT

## 2023-10-09 PROCEDURE — 99285 EMERGENCY DEPT VISIT HI MDM: CPT

## 2023-10-09 PROCEDURE — 96374 THER/PROPH/DIAG INJ IV PUSH: CPT

## 2023-10-09 PROCEDURE — 36600 WITHDRAWAL OF ARTERIAL BLOOD: CPT

## 2023-10-09 PROCEDURE — 71275 CT ANGIOGRAPHY CHEST: CPT | Mod: MA

## 2023-10-09 PROCEDURE — 85379 FIBRIN DEGRADATION QUANT: CPT

## 2023-10-09 PROCEDURE — 84484 ASSAY OF TROPONIN QUANT: CPT

## 2023-10-09 PROCEDURE — 0225U NFCT DS DNA&RNA 21 SARSCOV2: CPT

## 2023-10-09 PROCEDURE — 93306 TTE W/DOPPLER COMPLETE: CPT

## 2023-10-09 PROCEDURE — 85652 RBC SED RATE AUTOMATED: CPT

## 2023-10-09 PROCEDURE — 36415 COLL VENOUS BLD VENIPUNCTURE: CPT

## 2023-10-09 PROCEDURE — 99239 HOSP IP/OBS DSCHRG MGMT >30: CPT

## 2023-10-09 PROCEDURE — 81001 URINALYSIS AUTO W/SCOPE: CPT

## 2023-10-09 PROCEDURE — 87899 AGENT NOS ASSAY W/OPTIC: CPT

## 2023-10-09 PROCEDURE — 83880 ASSAY OF NATRIURETIC PEPTIDE: CPT

## 2023-10-09 PROCEDURE — 85027 COMPLETE CBC AUTOMATED: CPT

## 2023-10-09 PROCEDURE — 83605 ASSAY OF LACTIC ACID: CPT

## 2023-10-09 PROCEDURE — 71046 X-RAY EXAM CHEST 2 VIEWS: CPT

## 2023-10-09 PROCEDURE — 80048 BASIC METABOLIC PNL TOTAL CA: CPT

## 2023-10-09 PROCEDURE — 93308 TTE F-UP OR LMTD: CPT

## 2023-10-09 PROCEDURE — 82435 ASSAY OF BLOOD CHLORIDE: CPT

## 2023-10-09 PROCEDURE — 83735 ASSAY OF MAGNESIUM: CPT

## 2023-10-09 PROCEDURE — 86140 C-REACTIVE PROTEIN: CPT

## 2023-10-09 PROCEDURE — 85018 HEMOGLOBIN: CPT

## 2023-10-09 PROCEDURE — 82330 ASSAY OF CALCIUM: CPT

## 2023-10-09 PROCEDURE — 93017 CV STRESS TEST TRACING ONLY: CPT

## 2023-10-09 PROCEDURE — 82803 BLOOD GASES ANY COMBINATION: CPT

## 2023-10-09 PROCEDURE — 84132 ASSAY OF SERUM POTASSIUM: CPT

## 2023-10-09 PROCEDURE — 80053 COMPREHEN METABOLIC PANEL: CPT

## 2023-10-09 PROCEDURE — 93970 EXTREMITY STUDY: CPT

## 2023-10-09 PROCEDURE — 87040 BLOOD CULTURE FOR BACTERIA: CPT

## 2023-10-09 PROCEDURE — 78452 HT MUSCLE IMAGE SPECT MULT: CPT

## 2023-10-09 PROCEDURE — 84145 PROCALCITONIN (PCT): CPT

## 2023-10-09 PROCEDURE — 85014 HEMATOCRIT: CPT

## 2023-10-09 PROCEDURE — 87640 STAPH A DNA AMP PROBE: CPT

## 2023-10-09 PROCEDURE — 74177 CT ABD & PELVIS W/CONTRAST: CPT

## 2023-10-09 PROCEDURE — 87641 MR-STAPH DNA AMP PROBE: CPT

## 2023-10-09 PROCEDURE — 85025 COMPLETE CBC W/AUTO DIFF WBC: CPT

## 2023-10-09 PROCEDURE — 87449 NOS EACH ORGANISM AG IA: CPT

## 2023-10-09 PROCEDURE — 94640 AIRWAY INHALATION TREATMENT: CPT

## 2023-10-09 PROCEDURE — 82947 ASSAY GLUCOSE BLOOD QUANT: CPT

## 2023-10-09 PROCEDURE — 84295 ASSAY OF SERUM SODIUM: CPT

## 2023-10-09 PROCEDURE — 93005 ELECTROCARDIOGRAM TRACING: CPT

## 2023-10-09 RX ORDER — IPRATROPIUM/ALBUTEROL SULFATE 18-103MCG
3 AEROSOL WITH ADAPTER (GRAM) INHALATION
Qty: 360 | Refills: 0
Start: 2023-10-09 | End: 2023-11-07

## 2023-10-09 RX ORDER — APIXABAN 2.5 MG/1
1 TABLET, FILM COATED ORAL
Qty: 180 | Refills: 0
Start: 2023-10-09 | End: 2024-01-06

## 2023-10-09 RX ORDER — APIXABAN 2.5 MG/1
1 TABLET, FILM COATED ORAL
Refills: 0 | DISCHARGE

## 2023-10-09 RX ORDER — CEFUROXIME AXETIL 250 MG
1 TABLET ORAL
Qty: 8 | Refills: 0
Start: 2023-10-09 | End: 2023-10-12

## 2023-10-09 RX ORDER — BUDESONIDE AND FORMOTEROL FUMARATE DIHYDRATE 160; 4.5 UG/1; UG/1
2 AEROSOL RESPIRATORY (INHALATION)
Qty: 1 | Refills: 0
Start: 2023-10-09 | End: 2023-11-07

## 2023-10-09 RX ORDER — ALBUTEROL 90 UG/1
2 AEROSOL, METERED ORAL
Qty: 1 | Refills: 2
Start: 2023-10-09 | End: 2024-01-06

## 2023-10-09 RX ADMIN — Medication 3 MILLILITER(S): at 08:14

## 2023-10-09 RX ADMIN — BUDESONIDE AND FORMOTEROL FUMARATE DIHYDRATE 2 PUFF(S): 160; 4.5 AEROSOL RESPIRATORY (INHALATION) at 08:14

## 2023-10-09 RX ADMIN — CEFTRIAXONE 100 MILLIGRAM(S): 500 INJECTION, POWDER, FOR SOLUTION INTRAMUSCULAR; INTRAVENOUS at 12:05

## 2023-10-09 RX ADMIN — CHLORHEXIDINE GLUCONATE 1 APPLICATION(S): 213 SOLUTION TOPICAL at 06:13

## 2023-10-09 RX ADMIN — APIXABAN 5 MILLIGRAM(S): 2.5 TABLET, FILM COATED ORAL at 06:10

## 2023-10-09 RX ADMIN — SERTRALINE 150 MILLIGRAM(S): 25 TABLET, FILM COATED ORAL at 12:05

## 2023-10-09 NOTE — DISCHARGE NOTE NURSING/CASE MANAGEMENT/SOCIAL WORK - NSDCPEFALRISK_GEN_ALL_CORE
For information on Fall & Injury Prevention, visit: [email or web address] OR  [email or web address] OR  https://www.cdc.gov/steadi/patient.html

## 2023-10-09 NOTE — CHART NOTE - NSCHARTNOTEFT_GEN_A_CORE
Patient medically cleared for discharge today per Dr. Hoyt with outpatient PCP follow up.   D/C meds reviewed with Dr. Hoyt and sent to [facility]. DC on Ceftin x 4 more days for 7 day total course as well as inhalers. Spacer RX given to patient.   Hemodynamically stable for dc today.     DANE BrantleyC  [id number]

## 2023-10-09 NOTE — DISCHARGE NOTE PROVIDER - NSDCDCMDCOMP_GEN_ALL_CORE
Patient: John Ramos    Procedure Summary     Date: 12/08/22 Room / Location: [facility] 05 / SURGERY [facility]    Anesthesia Start: 1311 Anesthesia Stop: 1600    Procedure: L3-4 TRANSFORAMINAL LUMBAR INTERBODY FUSION WITH ROBOTIC ASSISTANCE (Spine Lumbar) Diagnosis: (DEGENERATIVE SPONDYLOSLISTHESIS)    Surgeons: Danny López M.D. Responsible Provider: Everardo Lea M.D.    Anesthesia Type: general ASA Status: 3          Final Anesthesia Type: general  Last vitals  BP   Blood Pressure: 137/72    Temp   36.8 °C (98.2 °F)    Pulse   76   Resp   20    SpO2   93 %      Anesthesia Post Evaluation    Patient location during evaluation: PACU  Patient participation: complete - patient participated  Level of consciousness: awake and alert and agitated    Airway patency: patent  Anesthetic complications: no  Cardiovascular status: hemodynamically stable  Respiratory status: acceptable  Hydration status: euvolemic  Comments: Agitated on emergence, better after versed.    PONV: none          No notable events documented.     Nurse Pain Score: 0 (NPRS)        
This document is complete and the patient is ready for discharge.

## 2023-10-09 NOTE — DISCHARGE NOTE PROVIDER - NSDCFUADDAPPT_GEN_ALL_CORE_FT
APPTS ARE READY TO BE MADE: [X ] YES    Best Family or Patient Contact (if needed):    Additional Information about above appointments (if needed):    1: PCP  2:   3:     Other comments or requests:    APPTS ARE READY TO BE MADE: [X ] YES    Best Family or Patient Contact (if needed):    Additional Information about above appointments (if needed):    1: PCP  2: Pulmonary  3:     Other comments or requests:    APPTS ARE READY TO BE MADE: [X ] YES    Best Family or Patient Contact (if needed):    Additional Information about above appointments (if needed):    1: PCP  2: Pulmonary  3:     Other comments or requests:   Patient no longer wishes to see RYAN Sage. Patient was scheduled with Dr. Ankit Jiang on 10/26/23 at 12:20pm at [address] [facility] [place], [place].    Patient was scheduled with Dr. Brody Rivas on 11/14/23 at 6:00pm at [address] [place] [address], patient will await a call back from the office with a sooner appointment.

## 2023-10-09 NOTE — DISCHARGE NOTE NURSING/CASE MANAGEMENT/SOCIAL WORK - NSDCFUADDAPPT_GEN_ALL_CORE_FT
APPTS ARE READY TO BE MADE: [X ] YES    Best Family or Patient Contact (if needed):    Additional Information about above appointments (if needed):    1: PCP  2: Pulmonary  3:     Other comments or requests:

## 2023-10-09 NOTE — DISCHARGE NOTE PROVIDER - HOSPITAL COURSE
78-year-old female who was last seen on 10/07/2019 secondary to an open wound to the back for scalp.  On that visit patient was scheduled to go to outpatient wound care for treatment of scalp wound. She is scheduled today for routine follow-up for wound evaluation.  Daughter states wound is doing much better.  She was given medication to place on the wound with dressing changes daily.  Daughter states that they have no complications.    Dressing was removed. Medication was clean off the wound and wound was clean with peroxide.  Wound has decreased in diameter as well as depth since her last visit.  Wound bed is beefy red and wound edges are pink.  No slough is noted. Ointment and dressing was replaced to wound as per wound care instructions.    We discussed with patient and family that the wound is healing nicely.  We recommended continuing treatments with wound care.  We discussed following up with patient in 1 month to allow for more healing of the wound. All the patient and family's questions were answered to their satisfaction.  Patient stated understanding anything discussed on today's visit.  They had no further questions and were satisfied with her visit.      
HPI:  58Y F w/ hx of vasospastic angina, asthma, recent PE on apixaban, as well as recent PNA presents from [facility] clinic w/ SOB and chest pain.   Pt has been in [place] where she has another house the past few months. Reports she was dx w/ a PE, for which she was started on apixaban. Since she's had exertional dysnpea, monitoring her amb SpO2 noting sometimes dropping below 70%. She was hospitalized recently in [place] for PNA and was treated w/ levofloxacin and steroids. She flew back to [place] and felt more SOB and HERNANDEZ w/ coughing, lightheadedness, chest pressure/heaviness and fatigue. She went to vacular clinic where she was noted to be HDS w/ SpO2 95% on RA but appeared dysnpic so she was sent to the ED. In the ED pt was HDS. EKG showed NSR w/ TWI in the septal/anterior precordial leads. CTPA showed no PE but L lingual opacity c/f atelectasis vs PNA. Labs were unremarkable including neg trop <6, neg BNP. She was seen by cardiology in ED who recommended  TTE, w/ bubble study to assess for possible shunt, repeat trops and pulm evaluation. Patient admitted to medicine for further evaluation.      (04 Oct 2023 15:25)    Hospital Course:    58Y F w/ hx of vasospastic angina, asthma, recent PE on apixaban, as well as recent PNA presents from vascular cards clinic w/ persistent SOB and chest pressure.     She was admitted for SIRS -10/5; patient spiked temp to 101.6 with associated tachycardia to 123 and BP 93/56, no further episodes  of fevers, Unclear source of potential infection. Recent hospitalization at OSH where she was dx with PNA. Treated with course of abx. Last day of levofloxacin 10/4., RVP negative on admission, Blood cultures neg, UA negative, CT Chest reviewed by pulm, lower concern for PNA and more consistent with atelectasis, given anaphylaxis to PCN documented in chart and confirmed by patient, ertapenem was initially started, narrowed to CTX today, ID was consulted and Can dc on Ceftin 500 q12h to complete 7 day course but can cont CTX while inpatient. Imaging negative for an obvious source.  Pt with hx of Prinzmetal angina, reports that she has chest pressure today with some acute twinges. No events on tele, f/u EKG, trop neg, TTE w/ bubble normal w/ no WMA, sent for Nuclear Stress test was normal but done in house due to dizziness and persistent SOB/chest pressure.   CTA was performed, negative for PE, started on symbicort 160 2 puffs BID, duonebs PRN, additional 2 days of prednisone 40 (through 10/6) to complete 7 day course of steroids, monitored on continuous pulse ox and at 95% RA.   For the history of Pulmonary embolism, diagnosed in August likely in the setting of long distance travel, she was placed on Eliquis 5 BID. Duplex negative for DVT.    Asthma, she has a Hx of WTC exposure, asthma. During hospitalization at OSH, was prescribed prednisone taper. She is currently on RA. Lungs CTA b/l; no wheezes. Not in an acute asthma exacerbation, continue inhalers.     Important Medication Changes and Reason:  Inhalers for shortness of breath/asthma  Ceftin for PNA       Active or Pending Issues Requiring Follow-up:  -Lingular opacification seen on CT Chest. Recommend CT chest follow-up in 3 months.    Advanced Directives:   [X ] Full code  [ ] DNR  [ ] Hospice    Discharge Diagnoses:  Pneumonia

## 2023-10-09 NOTE — DISCHARGE NOTE PROVIDER - NSDCMRMEDTOKEN_GEN_ALL_CORE_FT
Ambien 10 mg oral tablet: 1 tab(s) orally once a day (at bedtime)  benzonatate 100 mg oral capsule: 1 cap(s) orally every 8 hours as needed for Cough  budesonide-formoterol 160 mcg-4.5 mcg/inh inhalation aerosol: 2 puff(s) inhaled 2 times a day  cefuroxime 500 mg oral tablet: 1 tab(s) orally 2 times a day  Eliquis 5 mg oral tablet: 1 tab(s) orally 2 times a day  ipratropium-albuterol 0.5 mg-2.5 mg/3 mL inhalation solution: 3 milliliter(s) inhaled every 6 hours as needed for Shortness of Breath and/or Wheezing  sertraline 100 mg oral tablet: 1.5 tab(s) orally once a day  Spacer: Use as Directed   Albuterol (Eqv-ProAir HFA) 90 mcg/inh inhalation aerosol: 2 puff(s) inhaled 3 to 4 times a day  Ambien 10 mg oral tablet: 1 tab(s) orally once a day (at bedtime)  benzonatate 100 mg oral capsule: 1 cap(s) orally every 8 hours as needed for Cough  budesonide-formoterol 160 mcg-4.5 mcg/inh inhalation aerosol: 2 puff(s) inhaled 2 times a day  cefuroxime 500 mg oral tablet: 1 tab(s) orally 2 times a day  Eliquis 5 mg oral tablet: 1 tab(s) orally 2 times a day  ipratropium-albuterol 0.5 mg-2.5 mg/3 mL inhalation solution: 3 milliliter(s) inhaled every 6 hours as needed for Shortness of Breath and/or Wheezing  sertraline 100 mg oral tablet: 1.5 tab(s) orally once a day

## 2023-10-09 NOTE — DISCHARGE NOTE NURSING/CASE MANAGEMENT/SOCIAL WORK - PATIENT PORTAL LINK FT
You can access the FollowMyHealth Patient Portal offered by [facility] by registering at the following website: [email or web address]. By joining [employer]’s FollowMyHealth portal, you will also be able to view your health information using other applications (apps) compatible with our system.

## 2023-10-09 NOTE — DISCHARGE NOTE PROVIDER - ATTENDING DISCHARGE PHYSICAL EXAMINATION:
T(C): 37 (10-09-23 @ 11:46), Max: 37.1 (10-08-23 @ 21:06)  HR: 74 (10-09-23 @ 11:46) (74 - 92)  BP: 111/74 (10-09-23 @ 11:46) (108/72 - 124/82)  RR: 18 (10-09-23 @ 11:46) (18 - 18)  SpO2: 92% (10-09-23 @ 11:46) (92% - 95%)    GEN: (fe)male in NAD, appears comfortable, no diaphoresis  EYES: No scleral injection, PERRL, EOMI  ENTM: neck supple & symmetric without tracheal deviation, moist membranes, no gross hearing impairment, thyroid gland not enlarged  CV: +S1/S2, no m/r/g, no abdominal bruit, no LE edema  RESP: breathing comfortably, no respiratory accessory muscle use, CTAB, no w/r/r  GI: normoactive BS, soft, NTND, no rebounding/guarding, no palpable masses  LYMPHATICS: no LAD or tenderness to palpation  NEURO: AOx3, no focal deficits, CNII-XII grossly intact  PSYCH: No SI/HI/AVH, appropriate affect, appropriate insight/judgment   SKIN: no petechiae, ecchymosis or maculopapular rash noted

## 2023-10-09 NOTE — DISCHARGE NOTE PROVIDER - PROVIDER TOKENS
PROVIDER:[TOKEN:[[id number]],FOLLOWUP:[2 weeks]] PROVIDER:[TOKEN:[[id number]],FOLLOWUP:[2 weeks]],PROVIDER:[TOKEN:[[id number]],FOLLOWUP:[2 weeks]]

## 2023-10-09 NOTE — DISCHARGE NOTE PROVIDER - CARE PROVIDER_API CALL
Brittanie Sage  Physician Assistant Services  [address] [place] [address]  Phone: ([phone]  Fax: ()-  Follow Up Time: 2 weeks   Brittanie Sage  Physician Assistant Services  [address] [place] [address]  Phone: ()-  Fax: ()-  Follow Up Time: 2 weeks    Marija Moreno  Pulmonary Disease  [phone] [place] [id number]  Phone: [phone]  Fax: [phone]  Follow Up Time: 2 weeks

## 2023-10-09 NOTE — CHART NOTE - NSCHARTNOTEFT_GEN_A_CORE
(1) attempt(s) were made to reach patient, which have been unsuccessful. Unable to leave voicemail on 10/9

## 2023-10-09 NOTE — DISCHARGE NOTE PROVIDER - NSDCCPCAREPLAN_GEN_ALL_CORE_FT
PRINCIPAL DISCHARGE DIAGNOSIS  Diagnosis: SOB (shortness of breath)  Assessment and Plan of Treatment: You were admitted for persisten shortness of breath  CTA negative for a pulmonary embolism (blood clot)- continue your Eliquis   CT chest showed lower concern for PNA and more consistent with atelectasis- you were given antibiotics during your admission and are to continue oral antibiotics until finished  You have opacification on your CT chest- you will need outpatient follow up for repeat of imaging in 3 months         SECONDARY DISCHARGE DIAGNOSES  Diagnosis: Chest pain  Assessment and Plan of Treatment: You also have a history of Prinzmetal angina, (chest pressure with some acute twinges). There were no events on the cardiac monitor   A nuclear stress test was performed which was negative, your cardiac ultrasound was also negative    Diagnosis: Asthma  Assessment and Plan of Treatment: HOME CARE INSTRUCTIONS  Take medicines as directed by your caregiver.  Control your home environment in the following ways to help prevent asthma attacks:  Change your heating and air conditioning filter at least once a month.  Do not If you are on medication to help you quit smoking, be sure to take it as prescribed. Find healthy ways to deal with stress, such as exercise (check with your healthcare provider first), deep breathing, meditation, or enjoyable healthy hobbies.  Avoid situations that may cause you to smoke a cigarette.  Look for help with quitting; you are not alone. A resource to help you stop smoking is the [facility] – phone number 774-723-2979.. Do not stay in places where others are smoking.  Get rid of pests (such as roaches and mice) and their droppings.  If you see mold on a plant, throw it away.  Clean your floors and dust every week. Use unscented cleaning products. Use a vacuum  with a HEPA filter if possible. If vacuuming or cleaning triggers your asthma, try to find someone else to do these chores..  Use allergy-proof pillows, mattress covers, and box spring covers.  Wash bedsheets and blankets every week in hot water and dry in a dryer.  Use a blanket that is made of polyester or cotton with a tight nap.  Clean bathrooms and savita with bleach and repaint with mold-resistant paint.   Wash hands frequently.  Talk to your caregiver about an action plan for managing asthma attacks. This includes the use of a peak flow meter which measures the severity of the attack and medicines that can help stop the attack. An action plan can help minimize or stop the attack without having to seek medical care.  Always have a plan prepared for seeking medical attention. This should include contacting your caregiver and in the case of a severe attack, calling your local emergency services .  SEEK MEDICAL CARE IF:  You have wheezing, shortness of breath, or a cough even if taking medicine to prevent attacks.    Diagnosis: PNA (pneumonia)  Assessment and Plan of Treatment: You are prescribed oral antibiotics  Take until finished  Follow up with your PCP upon discharge    Diagnosis: Pulmonary embolism  Assessment and Plan of Treatment: Continue Eliquis   Eliquis is used to thin the blood so clots will not form and to keep existing ones from getting bigger.  Take this medication daily as prescribed by your health care provider.  Take this medication with food to prevent upset stomach.  If you miss a dose call your health care provider or pharmacist right away.  Tell your doctor you use this drug before you have a spinal or epidural procedure  Tell dentists, surgeon, and other doctors that you use this drug.  You may bleed more easily.  Be careful and avoid injury.  Use a soft toothbrush and an electric razor.      Diagnosis: SOB (shortness of breath)  Assessment and Plan of Treatment: You were prescribed a spacer to assist you with the inhaler use  Please remember to rinse your mouth after each use to prevent oral thrush       PRINCIPAL DISCHARGE DIAGNOSIS  Diagnosis: SOB (shortness of breath)  Assessment and Plan of Treatment: You were admitted for persisten shortness of breath  CTA negative for a pulmonary embolism (blood clot)- continue your Eliquis   CT chest showed lower concern for PNA and more consistent with atelectasis- you were given antibiotics during your admission and are to continue oral antibiotics until finished  You have opacification on your CT chest- you will need outpatient follow up for repeat of imaging in 3 months         SECONDARY DISCHARGE DIAGNOSES  Diagnosis: Pulmonary embolism  Assessment and Plan of Treatment: Continue Eliquis   Eliquis is used to thin the blood so clots will not form and to keep existing ones from getting bigger.  Take this medication daily as prescribed by your health care provider.  Take this medication with food to prevent upset stomach.  If you miss a dose call your health care provider or pharmacist right away.  Tell your doctor you use this drug before you have a spinal or epidural procedure  Tell dentists, surgeon, and other doctors that you use this drug.  You may bleed more easily.  Be careful and avoid injury.  Use a soft toothbrush and an electric razor.      Diagnosis: Asthma  Assessment and Plan of Treatment: HOME CARE INSTRUCTIONS  Take medicines as directed by your caregiver.  Control your home environment in the following ways to help prevent asthma attacks:  Change your heating and air conditioning filter at least once a month.  Do not If you are on medication to help you quit smoking, be sure to take it as prescribed. Find healthy ways to deal with stress, such as exercise (check with your healthcare provider first), deep breathing, meditation, or enjoyable healthy hobbies.  Avoid situations that may cause you to smoke a cigarette.  Look for help with quitting; you are not alone. A resource to help you stop smoking is the [facility] – phone number 243-798-0574.. Do not stay in places where others are smoking.  Get rid of pests (such as roaches and mice) and their droppings.  If you see mold on a plant, throw it away.  Clean your floors and dust every week. Use unscented cleaning products. Use a vacuum  with a HEPA filter if possible. If vacuuming or cleaning triggers your asthma, try to find someone else to do these chores..  Use allergy-proof pillows, mattress covers, and box spring covers.  Wash bedsheets and blankets every week in hot water and dry in a dryer.  Use a blanket that is made of polyester or cotton with a tight nap.  Clean bathrooms and savita with bleach and repaint with mold-resistant paint.   Wash hands frequently.  Talk to your caregiver about an action plan for managing asthma attacks. This includes the use of a peak flow meter which measures the severity of the attack and medicines that can help stop the attack. An action plan can help minimize or stop the attack without having to seek medical care.  Always have a plan prepared for seeking medical attention. This should include contacting your caregiver and in the case of a severe attack, calling your local emergency services .  SEEK MEDICAL CARE IF:  You have wheezing, shortness of breath, or a cough even if taking medicine to prevent attacks.    Diagnosis: PNA (pneumonia)  Assessment and Plan of Treatment: You are prescribed oral antibiotics  Take until finished  Follow up with your PCP upon discharge  In addition, please ask your PCP about Prevnar & Pneumovax vaccinations    Diagnosis: SOB (shortness of breath)  Assessment and Plan of Treatment: You were prescribed a spacer to assist you with the inhaler use  Please remember to rinse your mouth after each use to prevent oral thrush      Diagnosis: Chest pain  Assessment and Plan of Treatment: You also have a history of Prinzmetal angina, (chest pressure with some acute twinges). There were no events on the cardiac monitor   A nuclear stress test was performed which was negative, your cardiac ultrasound was also negative

## 2023-10-09 NOTE — DISCHARGE NOTE PROVIDER - NSDCFUSCHEDAPPT_GEN_ALL_CORE_FT
Brody Rivas  [facility]  [facility] [address] [facility]  Scheduled Appointment: 11/14/2023

## 2023-10-09 NOTE — DISCHARGE NOTE PROVIDER - NSDCPNSUBOBJ_GEN_ALL_CORE
Patient seen and examined at bedside. No acute events overnight. No CP/SOB. Ambulating fine.     Discussed unremarkable cardiac work up including TTE w/o shunt and NST which was unremarkable.    She will follow up with PCP, pulm, and cardiology OP. Requesting Eliquis refill.

## 2023-10-10 NOTE — CHART NOTE - NSCHARTNOTESELECT_GEN_ALL_CORE
Event Note
ISTOP/Event Note
d/c appt/Event Note
d/c appt/Event Note
Chest pain/Event Note
D/C/Event Note

## 2023-10-26 ENCOUNTER — APPOINTMENT (OUTPATIENT)
Dept: INTERNAL MEDICINE | Facility: CLINIC | Age: 58
End: 2023-10-26

## 2023-11-14 ENCOUNTER — APPOINTMENT (OUTPATIENT)
Dept: PULMONOLOGY | Facility: CLINIC | Age: 58
End: 2023-11-14
Payer: COMMERCIAL

## 2023-11-14 VITALS
RESPIRATION RATE: 16 BRPM | BODY MASS INDEX: 27.2 KG/M2 | HEIGHT: 70 IN | WEIGHT: 190 LBS | SYSTOLIC BLOOD PRESSURE: 118 MMHG | TEMPERATURE: 97.6 F | OXYGEN SATURATION: 97 % | HEART RATE: 85 BPM | DIASTOLIC BLOOD PRESSURE: 90 MMHG

## 2023-11-14 DIAGNOSIS — H93.13 TINNITUS, BILATERAL: ICD-10-CM

## 2023-11-14 DIAGNOSIS — Z57.9 OCCUPATIONAL EXPOSURE TO UNSPECIFIED RISK FACTOR: ICD-10-CM

## 2023-11-14 DIAGNOSIS — Z78.9 OTHER SPECIFIED HEALTH STATUS: ICD-10-CM

## 2023-11-14 DIAGNOSIS — Z72.820 SLEEP DEPRIVATION: ICD-10-CM

## 2023-11-14 DIAGNOSIS — J18.9 PNEUMONIA, UNSPECIFIED ORGANISM: ICD-10-CM

## 2023-11-14 DIAGNOSIS — Z13.6 ENCOUNTER FOR SCREENING FOR CARDIOVASCULAR DISORDERS: ICD-10-CM

## 2023-11-14 DIAGNOSIS — Z12.9 ENCOUNTER FOR SCREENING FOR MALIGNANT NEOPLASM, SITE UNSPECIFIED: ICD-10-CM

## 2023-11-14 DIAGNOSIS — Z13.228 ENCOUNTER FOR SCREENING FOR OTHER METABOLIC DISORDERS: ICD-10-CM

## 2023-11-14 DIAGNOSIS — C44.310 BASAL CELL CARCINOMA OF SKIN OF UNSPECIFIED PARTS OF FACE: ICD-10-CM

## 2023-11-14 PROCEDURE — 71046 X-RAY EXAM CHEST 2 VIEWS: CPT

## 2023-11-14 PROCEDURE — 94060 EVALUATION OF WHEEZING: CPT

## 2023-11-14 PROCEDURE — 94618 PULMONARY STRESS TESTING: CPT

## 2023-11-14 PROCEDURE — 99204 OFFICE O/P NEW MOD 45 MIN: CPT | Mod: 25

## 2023-11-14 RX ORDER — LEVALBUTEROL HYDROCHLORIDE 0.63 MG/3ML
0.63 SOLUTION RESPIRATORY (INHALATION)
Qty: 120 | Refills: 2 | Status: ACTIVE | COMMUNITY
Start: 2023-11-14 | End: 1900-01-01

## 2023-11-14 RX ORDER — PREDNISONE 10 MG/1
10 TABLET ORAL
Qty: 50 | Refills: 0 | Status: ACTIVE | COMMUNITY
Start: 2023-11-14 | End: 1900-01-01

## 2023-11-14 RX ORDER — AZELASTINE HYDROCHLORIDE 137 UG/1
0.1 SPRAY, METERED NASAL TWICE DAILY
Qty: 3 | Refills: 1 | Status: ACTIVE | COMMUNITY
Start: 2023-11-14 | End: 1900-01-01

## 2023-11-14 RX ORDER — MONTELUKAST 10 MG/1
10 TABLET, FILM COATED ORAL
Qty: 1 | Refills: 1 | Status: ACTIVE | COMMUNITY
Start: 2023-11-14 | End: 1900-01-01

## 2023-11-14 SDOH — HEALTH STABILITY - PHYSICAL HEALTH: OCCUPATIONAL EXPOSURE TO UNSPECIFIED RISK FACTOR: Z57.9

## 2023-11-17 ENCOUNTER — LABORATORY RESULT (OUTPATIENT)
Age: 58
End: 2023-11-17

## 2023-11-18 LAB
24R-OH-CALCIDIOL SERPL-MCNC: 56.1 PG/ML
25(OH)D3 SERPL-MCNC: 26.3 NG/ML
A1AT SERPL-MCNC: 152 MG/DL
BASOPHILS # BLD AUTO: 0.01 K/UL
BASOPHILS NFR BLD AUTO: 0.1 %
EOSINOPHIL # BLD AUTO: 0 K/UL
EOSINOPHIL NFR BLD AUTO: 0 %
HCT VFR BLD CALC: 42.1 %
HGB BLD-MCNC: 13.6 G/DL
IMM GRANULOCYTES NFR BLD AUTO: 0.4 %
LYMPHOCYTES # BLD AUTO: 0.99 K/UL
LYMPHOCYTES NFR BLD AUTO: 13.8 %
MAN DIFF?: NORMAL
MCHC RBC-ENTMCNC: 29.2 PG
MCHC RBC-ENTMCNC: 32.3 GM/DL
MCV RBC AUTO: 90.5 FL
MONOCYTES # BLD AUTO: 0.23 K/UL
MONOCYTES NFR BLD AUTO: 3.2 %
NEUTROPHILS # BLD AUTO: 5.93 K/UL
NEUTROPHILS NFR BLD AUTO: 82.5 %
PLATELET # BLD AUTO: 329 K/UL
RBC # BLD: 4.65 M/UL
RBC # FLD: 12.8 %
WBC # FLD AUTO: 7.19 K/UL

## 2023-11-19 LAB
A ALTERNATA IGE QN: 1.78 KUA/L
A FUMIGATUS IGE QN: <0.1 KUA/L
C ALBICANS IGE QN: <0.1 KUA/L
C HERBARUM IGE QN: <0.1 KUA/L
CAT DANDER IGE QN: 0.34 KUA/L
COMMON RAGWEED IGE QN: 0.15 KUA/L
D FARINAE IGE QN: 0.7 KUA/L
D PTERONYSS IGE QN: 0.6 KUA/L
DEPRECATED A ALTERNATA IGE RAST QL: 2
DEPRECATED A FUMIGATUS IGE RAST QL: 0
DEPRECATED C ALBICANS IGE RAST QL: 0
DEPRECATED C HERBARUM IGE RAST QL: 0
DEPRECATED CAT DANDER IGE RAST QL: NORMAL
DEPRECATED COMMON RAGWEED IGE RAST QL: NORMAL
DEPRECATED D FARINAE IGE RAST QL: 2
DEPRECATED D PTERONYSS IGE RAST QL: 1
DEPRECATED DOG DANDER IGE RAST QL: NORMAL
DEPRECATED DUCK FEATHER IGE RAST QL: 0
DEPRECATED GOOSE FEATHER IGE RAST QL: 0
DEPRECATED M RACEMOSUS IGE RAST QL: 0
DEPRECATED ROACH IGE RAST QL: 0
DEPRECATED TIMOTHY IGE RAST QL: 0
DEPRECATED WHITE OAK IGE RAST QL: NORMAL
DOG DANDER IGE QN: 0.26 KUA/L
DUCK FEATHER IGE QN: <0.1 KUA/L
GOOSE FEATHER IGE QN: <0.1 KUA/L
M RACEMOSUS IGE QN: <0.1 KUA/L
ROACH IGE QN: <0.1 KUA/L
TIMOTHY IGE QN: <0.1 KUA/L
WHITE OAK IGE QN: 0.3 KUA/L

## 2023-11-20 LAB
ALMOND IGE QN: <0.1 KUA/L
BRAZIL NUT IGE QN: <0.1 KUA/L
CASHEW NUT IGE QN: <0.1 KUA/L
CODFISH IGE QN: <0.1 KUA/L
COW MILK IGE QN: 0.35 KUA/L
DEPRECATED ALMOND IGE RAST QL: 0
DEPRECATED BRAZIL NUT IGE RAST QL: 0
DEPRECATED CASHEW NUT IGE RAST QL: 0
DEPRECATED CODFISH IGE RAST QL: 0
DEPRECATED COW MILK IGE RAST QL: 1
DEPRECATED EGG WHITE IGE RAST QL: 0
DEPRECATED HAZELNUT IGE RAST QL: 2
DEPRECATED PEANUT IGE RAST QL: 0
DEPRECATED SALMON IGE RAST QL: 0
DEPRECATED SCALLOP IGE RAST QL: <0.1 KUA/L
DEPRECATED SESAME SEED IGE RAST QL: 0
DEPRECATED SHRIMP IGE RAST QL: 0
DEPRECATED SOYBEAN IGE RAST QL: 0
DEPRECATED TUNA IGE RAST QL: 0
DEPRECATED WALNUT IGE RAST QL: 0
DEPRECATED WHEAT IGE RAST QL: 0
EGG WHITE IGE QN: <0.1 KUA/L
HAZELNUT IGE QN: 0.93 KUA/L
PEANUT IGE QN: <0.1 KUA/L
SALMON IGE QN: <0.1 KUA/L
SCALLOP IGE QN: 0
SCALLOP IGE QN: <0.1 KUA/L
SESAME SEED IGE QN: <0.1 KUA/L
SOYBEAN IGE QN: <0.1 KUA/L
TOTAL IGE SMQN RAST: 45 KU/L
TUNA IGE QN: <0.1 KUA/L
WALNUT IGE QN: <0.1 KUA/L
WHEAT IGE QN: <0.1 KUA/L

## 2023-11-21 ENCOUNTER — APPOINTMENT (OUTPATIENT)
Dept: CT IMAGING | Facility: CLINIC | Age: 58
End: 2023-11-21
Payer: COMMERCIAL

## 2023-11-21 PROCEDURE — 71250 CT THORAX DX C-: CPT

## 2023-11-22 ENCOUNTER — NON-APPOINTMENT (OUTPATIENT)
Age: 58
End: 2023-11-22

## 2023-11-22 ENCOUNTER — APPOINTMENT (OUTPATIENT)
Dept: CARDIOLOGY | Facility: CLINIC | Age: 58
End: 2023-11-22
Payer: COMMERCIAL

## 2023-11-22 VITALS
OXYGEN SATURATION: 93 % | BODY MASS INDEX: 27.2 KG/M2 | WEIGHT: 190 LBS | SYSTOLIC BLOOD PRESSURE: 112 MMHG | DIASTOLIC BLOOD PRESSURE: 77 MMHG | HEART RATE: 80 BPM | HEIGHT: 70 IN

## 2023-11-22 PROCEDURE — 99214 OFFICE O/P EST MOD 30 MIN: CPT

## 2023-11-22 RX ORDER — APIXABAN 5 MG/1
5 TABLET, FILM COATED ORAL
Qty: 180 | Refills: 3 | Status: ACTIVE | COMMUNITY
Start: 2023-10-04 | End: 1900-01-01

## 2023-11-24 ENCOUNTER — TRANSCRIPTION ENCOUNTER (OUTPATIENT)
Age: 58
End: 2023-11-24

## 2023-11-27 ENCOUNTER — TRANSCRIPTION ENCOUNTER (OUTPATIENT)
Age: 58
End: 2023-11-27

## 2023-11-27 LAB
A1AT PHENOTYP SERPL-IMP: NORMAL
A1AT SERPL-MCNC: 151 MG/DL
ANNOTATION COMMENT IMP: NORMAL
ELECTRONIC SIGNATURE: NORMAL
SERPINA1 GENE MUT TESTED BLD/T: NORMAL

## 2023-11-27 RX ORDER — PROMETHAZINE HYDROCHLORIDE AND DEXTROMETHORPHAN HYDROBROMIDE ORAL SOLUTION 15; 6.25 MG/5ML; MG/5ML
6.25-15 SOLUTION ORAL EVERY 6 HOURS
Qty: 1 | Refills: 0 | Status: ACTIVE | COMMUNITY
Start: 2023-11-27 | End: 1900-01-01

## 2023-11-28 ENCOUNTER — TRANSCRIPTION ENCOUNTER (OUTPATIENT)
Age: 58
End: 2023-11-28

## 2023-11-28 RX ORDER — NIRMATRELVIR AND RITONAVIR 300-100 MG
20 X 150 MG & KIT ORAL
Qty: 1 | Refills: 0 | Status: ACTIVE | COMMUNITY
Start: 2023-11-28 | End: 1900-01-01

## 2023-11-29 ENCOUNTER — APPOINTMENT (OUTPATIENT)
Dept: PULMONOLOGY | Facility: CLINIC | Age: 58
End: 2023-11-29
Payer: COMMERCIAL

## 2023-11-29 VITALS — BODY MASS INDEX: 26.77 KG/M2 | WEIGHT: 187 LBS | HEIGHT: 70 IN

## 2023-11-29 DIAGNOSIS — J31.0 CHRONIC RHINITIS: ICD-10-CM

## 2023-11-29 DIAGNOSIS — U07.1 COVID-19: ICD-10-CM

## 2023-11-29 PROCEDURE — 99214 OFFICE O/P EST MOD 30 MIN: CPT | Mod: 95

## 2023-12-01 ENCOUNTER — TRANSCRIPTION ENCOUNTER (OUTPATIENT)
Age: 58
End: 2023-12-01

## 2023-12-01 RX ORDER — PREDNISONE 10 MG/1
10 TABLET ORAL
Qty: 30 | Refills: 0 | Status: ACTIVE | COMMUNITY
Start: 2023-12-01 | End: 1900-01-01

## 2023-12-04 ENCOUNTER — APPOINTMENT (OUTPATIENT)
Dept: SLEEP CENTER | Facility: CLINIC | Age: 58
End: 2023-12-04

## 2023-12-29 NOTE — H&P ADULT - PROBLEM SELECTOR PROBLEM 4
Patient presents with the acute onset of right-sided cheek and ear burning and numbness with bilateral head pressure that started around 9:00 a.m..  Patient was immediately triaged and seen by me.     She denies difficulty speech vision chest pain disequilibrium or any unilateral muscle weakness.  She does have history of previous left sided Bell's palsy and currently does see neurologist.  Patient does tell that she has some residuals from left-sided Bell's palsy.  She denies any known history of stroke or coronary artery disease.  She also reports bilateral shoulder pain for couple of weeks but denies any chest pain or breathing difficulty    Neuro-no difficulty speech or vision noted, normal gait.  Alert awake oriented x3.  Patient does appear to have some residual left-sided Bell's palsy    Fingerstick glucose checked, discussed case with ER and patient transferred for further evaluation treatment immediately       PNA (pneumonia)

## 2024-01-17 ENCOUNTER — TRANSCRIPTION ENCOUNTER (OUTPATIENT)
Age: 59
End: 2024-01-17

## 2024-01-25 ENCOUNTER — TRANSCRIPTION ENCOUNTER (OUTPATIENT)
Age: 59
End: 2024-01-25

## 2024-01-29 ENCOUNTER — OUTPATIENT (OUTPATIENT)
Dept: OUTPATIENT SERVICES | Facility: HOSPITAL | Age: 59
LOS: 1 days | End: 2024-01-29
Payer: COMMERCIAL

## 2024-01-29 ENCOUNTER — APPOINTMENT (OUTPATIENT)
Dept: SLEEP CENTER | Facility: CLINIC | Age: 59
End: 2024-01-29
Payer: COMMERCIAL

## 2024-01-29 DIAGNOSIS — Z98.890 OTHER SPECIFIED POSTPROCEDURAL STATES: Chronic | ICD-10-CM

## 2024-01-29 DIAGNOSIS — Z90.49 ACQUIRED ABSENCE OF OTHER SPECIFIED PARTS OF DIGESTIVE TRACT: Chronic | ICD-10-CM

## 2024-01-29 PROCEDURE — 95800 SLP STDY UNATTENDED: CPT | Mod: 26

## 2024-01-29 PROCEDURE — 95800 SLP STDY UNATTENDED: CPT

## 2024-01-31 ENCOUNTER — TRANSCRIPTION ENCOUNTER (OUTPATIENT)
Age: 59
End: 2024-01-31

## 2024-01-31 ENCOUNTER — APPOINTMENT (OUTPATIENT)
Dept: OTHER | Facility: CLINIC | Age: 59
End: 2024-01-31

## 2024-02-01 DIAGNOSIS — G47.33 OBSTRUCTIVE SLEEP APNEA (ADULT) (PEDIATRIC): ICD-10-CM

## 2024-02-09 ENCOUNTER — APPOINTMENT (OUTPATIENT)
Dept: PULMONOLOGY | Facility: CLINIC | Age: 59
End: 2024-02-09
Payer: COMMERCIAL

## 2024-02-09 VITALS
WEIGHT: 192 LBS | OXYGEN SATURATION: 95 % | HEART RATE: 105 BPM | DIASTOLIC BLOOD PRESSURE: 70 MMHG | HEIGHT: 70 IN | SYSTOLIC BLOOD PRESSURE: 126 MMHG | BODY MASS INDEX: 27.49 KG/M2 | RESPIRATION RATE: 17 BRPM | TEMPERATURE: 97.3 F

## 2024-02-09 DIAGNOSIS — R05.3 CHRONIC COUGH: ICD-10-CM

## 2024-02-09 DIAGNOSIS — J45.50 SEVERE PERSISTENT ASTHMA, UNCOMPLICATED: ICD-10-CM

## 2024-02-09 DIAGNOSIS — R06.02 SHORTNESS OF BREATH: ICD-10-CM

## 2024-02-09 DIAGNOSIS — J45.909 UNSPECIFIED ASTHMA, UNCOMPLICATED: ICD-10-CM

## 2024-02-09 PROCEDURE — 94010 BREATHING CAPACITY TEST: CPT

## 2024-02-09 PROCEDURE — 99214 OFFICE O/P EST MOD 30 MIN: CPT | Mod: 25

## 2024-02-09 NOTE — ADDENDUM
[FreeTextEntry1] : Documented by Fartun David acting as a scribe for Dr. Brody Rivas on 02/09/2024. All medical record entries made by the Scribe were at my, Dr. Brody Rivas's, direction and personally dictated by me on 02/09/2024. I have reviewed the chart and agree that the record accurately reflects my personal performance of the history, physical exam, assessment and plan. I have also personally directed, reviewed, and agree with the discharge instructions.

## 2024-02-09 NOTE — ASSESSMENT
[FreeTextEntry1] : Ms. VU is a 58 year old female with a history of occupational exposure (9/11 [facility]), multiple skin cancers, s/p occupational horse injury to her R shoulder, GERD, asthma, chronic sinus disease, unprovoked pulmonary emboli x3 (8/2023), PNA x3 (9/2023 and 10/2023), COVID-19 (2020, 2021, 11/2023) who now comes to the office for a follow-up pulmonary evaluation for SOB, severe persistent asthma, NO TBM, pulmonary emboli (8/2023), allergies/sinus, GERD, no PHIL, Covid-19 11/2023  Her shortness of breath is multifactorial due to: -poor mechanics of breathing -out of shape -overweight -pulmonary disease  -severe persistent asthma  -history of PE  -NO TBM -cardiac disease (Dr. De Dios)  -doubtful  Problem 1: Asthma- severe persistent -continue Xopenex 0.63 via nebulizer q6H prn -continue Symbicort 160 2 inhalations BID -s/p Prednisone 30 mg x 5 days, 20 mg x 5 days, 10 mg x 5 days 11/2023 -Information sheet given to the patient to be reviewed, this medication is never to be used without consulting the prescribing physician. Proper dietary restraint is necessary specifically salt containing foods, if any reaction may occur should be reported. -Asthma is believed to be caused by inherited (genetic) and environmental factor, but its exact cause is unknown. Asthma may be triggered by allergens, lung infections, or irritants in the air. Asthma triggers are different for each person. -Inhaler technique reviewed as well as oral hygiene techniques reviewed with patient. Avoidance of cold air, extremes of temperature, rescue inhaler should be used before exercise. Order of medication reviewed with patient. Recommended use of a cool mist humidifier in the bedroom  Problem 1A: Steroid-Dependent Asthma -candidate for Dupixent -Dupixent is a prescription medicine used with other asthma medicines for the maintenance treatment of moderate-to-severe asthma in people aged 12 years and older whose asthma is not controlled with their current asthma medicines. Dupixent helps prevent severe asthma attacks (exacerbations) and can improve your breathing. Dupixent may also help reduce the amount of oral corticosteroids you need while preventing severe asthma attacks and improving your breathing. Dupixent is not used to treat sudden breathing problems. Risks and side effects of Dupixent were discussed and reviewed with patient.  Problem 1B: Covid-19 11/2023 (resolved) -s/p Paxlovid  Problem 2: PE (8/2023) -continue Eliquis 5 mg BID -continue to follow-up with Dr. De Dios  Problem 3: NO TBM -s/p dynamic chest CT -continue Amitriptyline 10 mg up to TID, QHS -Tracheomalacia is usually acquired in adults and common causes include damage by tracheostomy or endotracheal intubation damaging the tracheal cartilage with increase risk with multiple intubations, prolonged intubation, and concurrent high dose steroid therapy; external chest wall trauma and surgery; chronic compression of the trachea by benign etiologies (eg, benign mediastinal goiter) or malignancy; relapsing polychondritis; or recurrent infection. Tracheomalacia can be asymptomatic, however signs or symptoms can develop as the severity of the airway narrowing progresses with major symptoms include dyspnea, cough, and sputum retention. Other symptoms include severe paroxysms of coughing, wheezing or stridor, barking cough and may be exacerbated by forced expiration, cough, and Valsalva maneuver. Tracheomalacia is diagnosed by a bronchoscopic visualization of dynamic airway collapse on dynamic chest CT. Therapy is warranted in symptomatic patients with severe tracheomalacia and includes surgical repair as tracheobronchoplasty. The patient was referred to Dr. Ari Patel or Dr. Rafat Dai at [facility] for a surgical consult.  Problem 4: allergies/sinus -continue Flonase 1 sniff/nostril BID -continue Astelin 0.15% 1 sniff/nostril BID -s/p blood work to include: asthma panel (+), food IgE panel (+), IgE level, eosinophil level, vitamin D level (low) -Environmental measures for allergies were encouraged including mattress and pillow covers, air purifier, and environmental controls.  Problem 4A: Low Vitamin D -add Rx -Low vitamin D levels have been associated with asthma exacerbations and increased allergic symptoms. The goal, based on recent information, is maintaining levels between 50-70 and low normal is 30. Recommended 50,000 units every two weeks to once a month depending on the level.   Problem 5: GERD -add Dexilant 60 mg QAM, pre-breakfast  -continue Pepcid 40 mg QHS -Rule of 2s: avoid eating too much, eating too late, eating too spicy, eating two hours before bed. -Things to avoid including overeating, spicy foods, tight clothing, eating within three hours of bed, this list is not all inclusive. -For treatment of reflux, possible options discussed including diet control, H2 blockers, PPIs, as well as coating motility agents discussed as treatment options. Timing of meals and proximity of last meal to sleep were discussed. If symptoms persist, a formal gastrointestinal evaluation is needed.  Problem 6: no PHIL (elevated Mallampati class, poor quality sleep, GERD) -s/p home sleep study (WNL) -Sleep apnea is associated with adverse clinical consequences which can affect most organ systems. Cardiovascular disease risk includes arrhythmias, atrial fibrillation, hypertension, coronary artery disease, and stroke. Metabolic disorders include diabetes type 2, non-alcoholic fatty liver disease. Mood disorder especially depression; and cognitive decline especially in the elderly. Associations with chronic reflux/Barretts esophagus some but not all inclusive. -Reasons include arousal consistent with hypopnea; respiratory events most prominent in REM sleep or supine position; therefore sleep staging and body position are important for accurate diagnosis and estimation of AHI.  Problem 7: cardiac disease -recommended to continue to follow up with Cardiologist if needed (Dr. De Dios)  Problem 8: poor breathing mechanics -Recommended Cisco Barrientos and Alessandro breathing technique -Proper breathing techniques were reviewed with an emphasis on exhalation. Patient was instructed to breathe in for 1 second and out for 4 seconds. The patient was encouraged to not talk while walking.  Problem 9: overweight/ out of shape -recommended Berberine OTC supplement for visceral fat loss  -Weight loss, exercise, and diet control were discussed and are highly encouraged. Treatment options are given such as aqua therapy, and contacting a nutritionist. Recommended to use the elliptical, stationary bike, less use of the treadmill.  Problem 10: health maintenance -recommended yearly flu shot after October 15, 2023 -recommended strep pneumonia vaccines: Prevnar-20 vaccine, followed by Pneumo vaccine 23 one year following after 65 years old. -recommended early intervention for Upper Respiratory Infections (URIs) -recommended regular osteoporosis evaluations -recommended early dermatological evaluations -recommended after the age of 50 to the age of 70, colonoscopy every 5 years  F/P in 3-4 months She is encouraged to call with any changes, questions, or concerns.

## 2024-02-09 NOTE — PROCEDURE
[FreeTextEntry1] : Sleep study (1/29/24) revealed no sleep apnea with an AHI/RUFINO of 2.5, and a low oxygen saturation of 85%   PFTs revealed normal flows; FEV1 was 3.04L, which is 93.4% of predicted; normal flow volume loop. PFTs were performed to evaluate for SOB, asthma  FENO was 14; a normal value being less than 25 Fractional exhaled nitric oxide (FENO) is regarded as a simple, noninvasive method for assessing eosinophilic airway inflammation. Produced by a variety of cells within the lung, nitric oxide (NO) concentrations are generally low in healthy individuals. However, high concentrations of NO appear to be involved in nonspecific host defense mechanisms and chronic inflammatory diseases such as asthma. The American Thoracic Society (ATS) therefore has recommended using FENO to aid in the diagnosis and monitoring of eosinophilic airway inflammation and asthma, and for identifying steroid responsive individuals whose chronic respiratory symptoms may be caused by airway inflammation.

## 2024-02-09 NOTE — REASON FOR VISIT
[Follow-Up] : a follow-up visit [TextBox_44] : SOB, severe persistent asthma, NO TBM, pulmonary emboli (8/2023), allergies/sinus, GERD, no PHIL, Covid-19 11/2023

## 2024-02-09 NOTE — HISTORY OF PRESENT ILLNESS
[TextBox_4] : Ms. VU is a 58 year old female with a history of occupational exposure (9/11 [facility]), multiple skin cancers, s/p occupational horse injury to her R shoulder, GERD, asthma, chronic sinus disease, unprovoked pulmonary emboli x3 (8/2023), PNA x3 (9/2023 and 10/2023), COVID-19 (2020, 2021, 11/2023) presenting to the office today for a follow- up pulmonary evaluation. Her chief complaint is  -she notes feeling fatigued s/p COVID 11/2023. She's still not at her baseline -she notes coughing when she takes a deep breath. -she notes having R-sided back pressure -she notes intermittent sour taste in her mouth -she notes vision is stable  -she notes she's gaining weight, and she has a voracious appetite -she denies exercising as much as before (biking, water aerobics) because she knows she'll have SOB -she notes good quality sleep on Ambien -she notes she's on Pepcid, and it doesn't fully control her GERD -she notes her face is swollen at this time -she notes she had recently gotten off steroids when she got bloodwork 11/2023  -she denies any headaches, nausea, emesis, fever, chills, sweats, chest pain, chest pressure, wheezing, palpitations, diarrhea, constipation, dysphagia, vertigo, arthralgias, myalgias, leg swelling, itchy eyes, itchy ears

## 2024-02-09 NOTE — PHYSICAL EXAM
[Normal Oropharynx] : normal oropharynx [No Acute Distress] : no acute distress [II] : Mallampati Class: II [Normal Appearance] : normal appearance [No Neck Mass] : no neck mass [Normal Rate/Rhythm] : normal rate/rhythm [Normal S1, S2] : normal s1, s2 [No Murmurs] : no murmurs [No Resp Distress] : no resp distress [No Abnormalities] : no abnormalities [Benign] : benign [Normal Gait] : normal gait [No Clubbing] : no clubbing [No Cyanosis] : no cyanosis [No Edema] : no edema [FROM] : FROM [Normal Color/ Pigmentation] : normal color/ pigmentation [No Focal Deficits] : no focal deficits [Oriented x3] : oriented x3 [Normal Affect] : normal affect [TextBox_68] : I:E ratio 1:3; mild expiratory wheeze

## 2024-02-14 ENCOUNTER — TRANSCRIPTION ENCOUNTER (OUTPATIENT)
Age: 59
End: 2024-02-14

## 2024-02-20 ENCOUNTER — TRANSCRIPTION ENCOUNTER (OUTPATIENT)
Age: 59
End: 2024-02-20

## 2024-02-21 RX ORDER — DUPILUMAB 300 MG/2ML
300 INJECTION, SOLUTION SUBCUTANEOUS
Qty: 1 | Refills: 0 | Status: ACTIVE | COMMUNITY
Start: 2024-02-20 | End: 1900-01-01

## 2024-02-26 ENCOUNTER — APPOINTMENT (OUTPATIENT)
Dept: OTHER | Facility: CLINIC | Age: 59
End: 2024-02-26

## 2024-02-27 ENCOUNTER — TRANSCRIPTION ENCOUNTER (OUTPATIENT)
Age: 59
End: 2024-02-27

## 2024-02-28 RX ORDER — EPINEPHRINE 0.3 MG/.3ML
0.3 INJECTION INTRAMUSCULAR
Qty: 1 | Refills: 1 | Status: ACTIVE | COMMUNITY
Start: 2024-02-20 | End: 1900-01-01

## 2024-02-29 ENCOUNTER — TRANSCRIPTION ENCOUNTER (OUTPATIENT)
Age: 59
End: 2024-02-29

## 2024-02-29 RX ORDER — DEXLANSOPRAZOLE 60 MG/1
60 CAPSULE, DELAYED RELEASE ORAL
Qty: 90 | Refills: 1 | Status: DISCONTINUED | COMMUNITY
Start: 2024-02-09 | End: 2024-02-29

## 2024-02-29 RX ORDER — PANTOPRAZOLE 40 MG/1
40 TABLET, DELAYED RELEASE ORAL
Qty: 90 | Refills: 1 | Status: ACTIVE | COMMUNITY
Start: 2024-02-29 | End: 1900-01-01

## 2024-03-06 ENCOUNTER — TRANSCRIPTION ENCOUNTER (OUTPATIENT)
Age: 59
End: 2024-03-06

## 2024-03-21 ENCOUNTER — TRANSCRIPTION ENCOUNTER (OUTPATIENT)
Age: 59
End: 2024-03-21

## 2024-03-22 ENCOUNTER — TRANSCRIPTION ENCOUNTER (OUTPATIENT)
Age: 59
End: 2024-03-22

## 2024-03-22 RX ORDER — AZITHROMYCIN 500 MG/1
500 TABLET, FILM COATED ORAL DAILY
Qty: 7 | Refills: 0 | Status: ACTIVE | COMMUNITY
Start: 2024-03-22 | End: 1900-01-01

## 2024-03-22 RX ORDER — PREDNISONE 10 MG/1
10 TABLET ORAL
Qty: 21 | Refills: 0 | Status: ACTIVE | COMMUNITY
Start: 2024-03-22 | End: 1900-01-01

## 2024-03-22 RX ORDER — BUDESONIDE AND FORMOTEROL FUMARATE DIHYDRATE 160; 4.5 UG/1; UG/1
160-4.5 AEROSOL RESPIRATORY (INHALATION) TWICE DAILY
Qty: 1 | Refills: 2 | Status: ACTIVE | COMMUNITY
Start: 2024-03-21 | End: 1900-01-01

## 2024-03-23 LAB
RAPID RVP RESULT: NOT DETECTED
SARS-COV-2 RNA PNL RESP NAA+PROBE: NOT DETECTED

## 2024-03-27 ENCOUNTER — TRANSCRIPTION ENCOUNTER (OUTPATIENT)
Age: 59
End: 2024-03-27

## 2024-03-30 NOTE — HEALTH RISK ASSESSMENT
[Patient reported colonoscopy was normal] : Patient reported colonoscopy was normal [ColonoscopyDate] : 2020 Walk in

## 2024-05-13 ENCOUNTER — RX RENEWAL (OUTPATIENT)
Age: 59
End: 2024-05-13

## 2024-05-13 ENCOUNTER — APPOINTMENT (OUTPATIENT)
Dept: CT IMAGING | Facility: CLINIC | Age: 59
End: 2024-05-13
Payer: COMMERCIAL

## 2024-05-13 PROCEDURE — 71250 CT THORAX DX C-: CPT

## 2024-05-13 RX ORDER — AMITRIPTYLINE HYDROCHLORIDE 10 MG/1
10 TABLET, FILM COATED ORAL 3 TIMES DAILY
Qty: 90 | Refills: 5 | Status: ACTIVE | COMMUNITY
Start: 2023-11-29 | End: 1900-01-01

## 2024-05-13 RX ORDER — FAMOTIDINE 40 MG/1
40 TABLET, FILM COATED ORAL
Qty: 30 | Refills: 5 | Status: ACTIVE | COMMUNITY
Start: 2023-11-14 | End: 1900-01-01

## 2024-05-14 ENCOUNTER — RX RENEWAL (OUTPATIENT)
Age: 59
End: 2024-05-14

## 2024-05-16 ENCOUNTER — TRANSCRIPTION ENCOUNTER (OUTPATIENT)
Age: 59
End: 2024-05-16

## 2024-05-21 ENCOUNTER — TRANSCRIPTION ENCOUNTER (OUTPATIENT)
Age: 59
End: 2024-05-21

## 2024-06-13 RX ORDER — DUPILUMAB 300 MG/2ML
300 INJECTION, SOLUTION SUBCUTANEOUS
Qty: 3 | Refills: 1 | Status: ACTIVE | COMMUNITY
Start: 2024-02-20

## 2024-06-26 ENCOUNTER — APPOINTMENT (OUTPATIENT)
Dept: PULMONOLOGY | Facility: CLINIC | Age: 59
End: 2024-06-26
Payer: COMMERCIAL

## 2024-06-26 VITALS
RESPIRATION RATE: 16 BRPM | SYSTOLIC BLOOD PRESSURE: 132 MMHG | HEIGHT: 70 IN | OXYGEN SATURATION: 96 % | DIASTOLIC BLOOD PRESSURE: 66 MMHG | TEMPERATURE: 97.2 F | BODY MASS INDEX: 26.34 KG/M2 | WEIGHT: 184 LBS | HEART RATE: 95 BPM

## 2024-06-26 DIAGNOSIS — J39.8 OTHER SPECIFIED DISEASES OF UPPER RESPIRATORY TRACT: ICD-10-CM

## 2024-06-26 DIAGNOSIS — J30.2 OTHER SEASONAL ALLERGIC RHINITIS: ICD-10-CM

## 2024-06-26 DIAGNOSIS — R93.89 ABNORMAL FINDINGS ON DIAGNOSTIC IMAGING OF OTHER SPECIFIED BODY STRUCTURES: ICD-10-CM

## 2024-06-26 DIAGNOSIS — R06.02 SHORTNESS OF BREATH: ICD-10-CM

## 2024-06-26 DIAGNOSIS — K21.9 GASTRO-ESOPHAGEAL REFLUX DISEASE W/OUT ESOPHAGITIS: ICD-10-CM

## 2024-06-26 DIAGNOSIS — Z57.9 OCCUPATIONAL EXPOSURE TO UNSPECIFIED RISK FACTOR: ICD-10-CM

## 2024-06-26 DIAGNOSIS — I26.99 OTHER PULMONARY EMBOLISM W/OUT ACUTE COR PULMONALE: ICD-10-CM

## 2024-06-26 DIAGNOSIS — J45.50 SEVERE PERSISTENT ASTHMA, UNCOMPLICATED: ICD-10-CM

## 2024-06-26 PROCEDURE — 94727 GAS DIL/WSHOT DETER LNG VOL: CPT

## 2024-06-26 PROCEDURE — ZZZZZ: CPT

## 2024-06-26 PROCEDURE — 94010 BREATHING CAPACITY TEST: CPT

## 2024-06-26 PROCEDURE — 99214 OFFICE O/P EST MOD 30 MIN: CPT | Mod: 25

## 2024-06-26 PROCEDURE — 95012 NITRIC OXIDE EXP GAS DETER: CPT

## 2024-06-26 PROCEDURE — 94729 DIFFUSING CAPACITY: CPT

## 2024-06-26 SDOH — HEALTH STABILITY - PHYSICAL HEALTH: OCCUPATIONAL EXPOSURE TO UNSPECIFIED RISK FACTOR: Z57.9

## 2024-07-30 ENCOUNTER — TRANSCRIPTION ENCOUNTER (OUTPATIENT)
Age: 59
End: 2024-07-30

## 2024-07-31 ENCOUNTER — NON-APPOINTMENT (OUTPATIENT)
Age: 59
End: 2024-07-31

## 2024-07-31 ENCOUNTER — TRANSCRIPTION ENCOUNTER (OUTPATIENT)
Age: 59
End: 2024-07-31

## 2024-08-02 ENCOUNTER — APPOINTMENT (OUTPATIENT)
Dept: CARDIOLOGY | Facility: CLINIC | Age: 59
End: 2024-08-02
Payer: COMMERCIAL

## 2024-08-02 ENCOUNTER — NON-APPOINTMENT (OUTPATIENT)
Age: 59
End: 2024-08-02

## 2024-08-02 VITALS
BODY MASS INDEX: 25.91 KG/M2 | OXYGEN SATURATION: 96 % | DIASTOLIC BLOOD PRESSURE: 80 MMHG | WEIGHT: 181 LBS | HEIGHT: 70 IN | SYSTOLIC BLOOD PRESSURE: 106 MMHG | HEART RATE: 72 BPM

## 2024-08-02 DIAGNOSIS — Z13.6 ENCOUNTER FOR SCREENING FOR CARDIOVASCULAR DISORDERS: ICD-10-CM

## 2024-08-02 DIAGNOSIS — E78.5 HYPERLIPIDEMIA, UNSPECIFIED: ICD-10-CM

## 2024-08-02 DIAGNOSIS — R06.02 SHORTNESS OF BREATH: ICD-10-CM

## 2024-08-02 PROCEDURE — 99215 OFFICE O/P EST HI 40 MIN: CPT | Mod: 25

## 2024-08-02 PROCEDURE — G2211 COMPLEX E/M VISIT ADD ON: CPT | Mod: NC

## 2024-08-02 PROCEDURE — 93000 ELECTROCARDIOGRAM COMPLETE: CPT

## 2024-08-02 RX ORDER — ATORVASTATIN CALCIUM 20 MG/1
20 TABLET, FILM COATED ORAL
Qty: 90 | Refills: 1 | Status: ACTIVE | COMMUNITY
Start: 2024-08-02 | End: 1900-01-01

## 2024-08-03 LAB
ALBUMIN SERPL ELPH-MCNC: 4.7 G/DL
ALP BLD-CCNC: 49 U/L
ALT SERPL-CCNC: 10 U/L
ANION GAP SERPL CALC-SCNC: 13 MMOL/L
AST SERPL-CCNC: 13 U/L
BILIRUB SERPL-MCNC: 0.8 MG/DL
BUN SERPL-MCNC: 13 MG/DL
CALCIUM SERPL-MCNC: 10.1 MG/DL
CHLORIDE SERPL-SCNC: 104 MMOL/L
CHOLEST SERPL-MCNC: 199 MG/DL
CO2 SERPL-SCNC: 24 MMOL/L
CREAT SERPL-MCNC: 0.64 MG/DL
CRP SERPL-MCNC: <3 MG/L
EGFR: 103 ML/MIN/1.73M2
ESTIMATED AVERAGE GLUCOSE: 97 MG/DL
GLUCOSE SERPL-MCNC: 84 MG/DL
HBA1C MFR BLD HPLC: 5 %
HCT VFR BLD CALC: 39.6 %
HDLC SERPL-MCNC: 54 MG/DL
HGB BLD-MCNC: 12.8 G/DL
LDLC SERPL CALC-MCNC: 127 MG/DL
MCHC RBC-ENTMCNC: 28.7 PG
MCHC RBC-ENTMCNC: 32.3 GM/DL
MCV RBC AUTO: 88.8 FL
NONHDLC SERPL-MCNC: 145 MG/DL
PLATELET # BLD AUTO: 303 K/UL
POTASSIUM SERPL-SCNC: 4.2 MMOL/L
PROT SERPL-MCNC: 7 G/DL
RBC # BLD: 4.46 M/UL
RBC # FLD: 12.6 %
SODIUM SERPL-SCNC: 141 MMOL/L
TRIGL SERPL-MCNC: 100 MG/DL
TSH SERPL-ACNC: 1.45 UIU/ML
WBC # FLD AUTO: 5.4 K/UL

## 2024-08-05 LAB — APO LP(A) SERPL-MCNC: <9 NMOL/L

## 2024-08-06 ENCOUNTER — TRANSCRIPTION ENCOUNTER (OUTPATIENT)
Age: 59
End: 2024-08-06

## 2024-08-15 ENCOUNTER — APPOINTMENT (OUTPATIENT)
Dept: CARDIOLOGY | Facility: CLINIC | Age: 59
End: 2024-08-15
Payer: COMMERCIAL

## 2024-08-15 DIAGNOSIS — E78.5 HYPERLIPIDEMIA, UNSPECIFIED: ICD-10-CM

## 2024-08-15 DIAGNOSIS — Z78.9 OTHER SPECIFIED HEALTH STATUS: ICD-10-CM

## 2024-08-15 DIAGNOSIS — R06.02 SHORTNESS OF BREATH: ICD-10-CM

## 2024-08-15 DIAGNOSIS — I25.10 ATHEROSCLEROTIC HEART DISEASE OF NATIVE CORONARY ARTERY W/OUT ANGINA PECTORIS: ICD-10-CM

## 2024-08-15 PROCEDURE — 99214 OFFICE O/P EST MOD 30 MIN: CPT

## 2024-08-15 PROCEDURE — G2211 COMPLEX E/M VISIT ADD ON: CPT | Mod: NC

## 2024-08-15 RX ORDER — EVOLOCUMAB 140 MG/ML
140 INJECTION, SOLUTION SUBCUTANEOUS
Qty: 1 | Refills: 5 | Status: ACTIVE | COMMUNITY
Start: 2024-08-15 | End: 1900-01-01

## 2024-08-19 ENCOUNTER — TRANSCRIPTION ENCOUNTER (OUTPATIENT)
Age: 59
End: 2024-08-19

## 2024-08-20 ENCOUNTER — TRANSCRIPTION ENCOUNTER (OUTPATIENT)
Age: 59
End: 2024-08-20

## 2024-08-22 ENCOUNTER — TRANSCRIPTION ENCOUNTER (OUTPATIENT)
Age: 59
End: 2024-08-22

## 2024-08-28 ENCOUNTER — APPOINTMENT (OUTPATIENT)
Dept: CARDIOLOGY | Facility: CLINIC | Age: 59
End: 2024-08-28
Payer: COMMERCIAL

## 2024-08-28 ENCOUNTER — NON-APPOINTMENT (OUTPATIENT)
Age: 59
End: 2024-08-28

## 2024-08-28 VITALS
DIASTOLIC BLOOD PRESSURE: 74 MMHG | OXYGEN SATURATION: 98 % | HEART RATE: 76 BPM | WEIGHT: 180 LBS | SYSTOLIC BLOOD PRESSURE: 108 MMHG | HEIGHT: 70 IN | BODY MASS INDEX: 25.77 KG/M2

## 2024-08-28 DIAGNOSIS — Z85.820 PERSONAL HISTORY OF MALIGNANT MELANOMA OF SKIN: ICD-10-CM

## 2024-08-28 DIAGNOSIS — E78.5 HYPERLIPIDEMIA, UNSPECIFIED: ICD-10-CM

## 2024-08-28 DIAGNOSIS — I26.99 OTHER PULMONARY EMBOLISM W/OUT ACUTE COR PULMONALE: ICD-10-CM

## 2024-08-28 PROCEDURE — G2211 COMPLEX E/M VISIT ADD ON: CPT | Mod: NC

## 2024-08-28 PROCEDURE — 99214 OFFICE O/P EST MOD 30 MIN: CPT

## 2024-08-28 NOTE — PHYSICAL EXAM
[Normal Conjunctiva] : the conjunctiva exhibited no abnormalities [Eyelids - No Xanthelasma] : the eyelids demonstrated no xanthelasmas [Normal Oral Mucosa] : normal oral mucosa [No Oral Pallor] : no oral pallor [No Oral Cyanosis] : no oral cyanosis [Normal Jugular Venous A Waves Present] : normal jugular venous A waves present [Normal Jugular Venous V Waves Present] : normal jugular venous V waves present [No Jugular Venous Wei A Waves] : no jugular venous wei A waves [Respiration, Rhythm And Depth] : normal respiratory rhythm and effort [Exaggerated Use Of Accessory Muscles For Inspiration] : no accessory muscle use [Auscultation Breath Sounds / Voice Sounds] : lungs were clear to auscultation bilaterally [Heart Rate And Rhythm] : heart rate and rhythm were normal [Heart Sounds] : normal S1 and S2 [Murmurs] : no murmurs present [Abdomen Soft] : soft [Abdomen Tenderness] : non-tender [Abdomen Mass (___ Cm)] : no abdominal mass palpated [Abnormal Walk] : normal gait [Gait - Sufficient For Exercise Testing] : the gait was sufficient for exercise testing [] : no ischemic changes [No Skin Ulcers] : no skin ulcer [Oriented To Time, Place, And Person] : oriented to person, place, and time [FreeTextEntry1] : Minimal L LE edema, No R LE edema

## 2024-08-28 NOTE — REVIEW OF SYSTEMS
[Dyspnea on exertion] : dyspnea during exertion [Chest Discomfort] : chest discomfort [Cough] : cough [Negative] : Heme/Lymph [FreeTextEntry5] : see HPI [FreeTextEntry9] : see HPI

## 2024-08-28 NOTE — REASON FOR VISIT
[Follow-Up - Clinic] : a clinic follow-up of [FreeTextEntry2] : PE [FreeTextEntry1] : 8/28/2024  Since last visit patient reports breathing and chest improved. Decreased LE edema post trauma. No bleeding on Eliquis.  Medications: Eliquis 5 mg BID Repatha Inhalers  Ambien  7/2024 Patient with history of Statin intolerance and elevated cholesterol, plan for referral to Lipidology.  11/22/2023  Remains on eliquis 5mg BID No more fevers Had CT chest yesterday  - ? left lung opacity - clearing?  Awaiting official read  Echo with bubble study was normal Saw dr. Rivas breathing is "terrible" - worse  BP and HR well controlled.       Nebulaize 4x per day inhaler 2x per day Prednisone taper     58 year old female  thickened urinary bladder on prior CT in  2022 and 2017  History of [facility] exposure, asthma, gerd, upper resp issues.  Cancer - melanoma, basal cell, squamous cell She is seen at [facility] for this.   Lohman- has benign tumor on pancreas was on zolpidem   No chemo or radations  Travelling from [place] and [place] 12 hour trip - 1-2 stops.  She drove.  Trip was mid august 1 2 ([facility]), 16th to [place].  Then flew August 25 from [facility] to [place] - 1 hour 20 minutes.  Back to [facility] next day.  She was already feeling lightheaded and sick in [place].  Lightheaded, shortness of breath.    Was diagnosed with PE, bilateral .  no DVT.  Admitted to hospital for 3 days.  Started on eliquis immediately.   Had urinary catheter.    Was discharged on eliquis  Then wasnt feeling well, went to hospital, sent home.   Was seen by PCP afterwards, was told all ok, last week felt light headed and shakey - near syncope.  Went to hospital, ER, CT chest, was told PE was resolved.  Was told there is PNA.  Echo was normal.  Was given antibiotics and prednisone.   no bubble study performed.   Medications: Eliquis 5mg BID Levofloxacin Inhalor Prednisone   O2 Sat is currently 94%  Currently - she feels better, but not perfect.  Seeing Dr. Rivas in November

## 2024-08-28 NOTE — ASSESSMENT
[FreeTextEntry1] : Assessment: 1. PE in August 2023     Occurred after a drive / 1.5 hour flight 2. History of Melanoma and Basal and Squamous Cell CA 3. Pancreatic Cyst - followed at [facility]    Plan: 1. History of PE:     Continue Eliquis 5 mg BID. D-dimer, Factor V Leiden, Prothrombin Gene Mutation If testing is okay, will consider reduction of Eliquis 2.5 mg BID. Paired with a D-dimer 4 weeks afterwards.  If we reduce, on days on travel take Eliquis 5 mg. 2. Pre-operative planning: Mohs procedure on 10/22/2024 Hold Eliquis 2 days prior to procedure. Resume post procedure the following day. 3: Hypercoagulable testing:     Above labs ordered. 4. HLD: Continue Repatha, Appreciate Lipidology.  5. Follow-up in 3 months. Call with any questions. Office will call with test results.      I, Dr. Mike De Dios, personally performed the evaluation and management (E/M) services for this established patient who presents today.  That E/M includes conducting the examination, assessing all new/exacerbated conditions, and establishing a new plan of care.  Today, my ACP, Leeroy Castillo, was here to observe my evaluation and management services for this new problem/exacerbated condition to be followed going forward.

## 2024-08-29 LAB
ALBUMIN SERPL ELPH-MCNC: 5 G/DL
ALP BLD-CCNC: 50 U/L
ALT SERPL-CCNC: 10 U/L
ANION GAP SERPL CALC-SCNC: 14 MMOL/L
AST SERPL-CCNC: 16 U/L
BILIRUB SERPL-MCNC: 0.8 MG/DL
BUN SERPL-MCNC: 11 MG/DL
CALCIUM SERPL-MCNC: 9.8 MG/DL
CHLORIDE SERPL-SCNC: 102 MMOL/L
CO2 SERPL-SCNC: 26 MMOL/L
CREAT SERPL-MCNC: 0.68 MG/DL
DEPRECATED D DIMER PPP IA-ACNC: <150 NG/ML DDU
EGFR: 102 ML/MIN/1.73M2
GLUCOSE SERPL-MCNC: 65 MG/DL
HCT VFR BLD CALC: 41.6 %
HGB BLD-MCNC: 13.3 G/DL
MCHC RBC-ENTMCNC: 29 PG
MCHC RBC-ENTMCNC: 32 GM/DL
MCV RBC AUTO: 90.8 FL
PLATELET # BLD AUTO: 319 K/UL
POTASSIUM SERPL-SCNC: 4.8 MMOL/L
PROT SERPL-MCNC: 7.4 G/DL
RBC # BLD: 4.58 M/UL
RBC # FLD: 12.8 %
SODIUM SERPL-SCNC: 142 MMOL/L
WBC # FLD AUTO: 5.67 K/UL

## 2024-08-30 LAB — PTR INTERP: NORMAL

## 2024-09-03 LAB — DNA PLOIDY SPEC FC-IMP: NORMAL

## 2024-09-04 DIAGNOSIS — Z00.00 ENCOUNTER FOR GENERAL ADULT MEDICAL EXAMINATION W/OUT ABNORMAL FINDINGS: ICD-10-CM

## 2024-09-09 ENCOUNTER — TRANSCRIPTION ENCOUNTER (OUTPATIENT)
Age: 59
End: 2024-09-09

## 2024-09-11 ENCOUNTER — TRANSCRIPTION ENCOUNTER (OUTPATIENT)
Age: 59
End: 2024-09-11

## 2024-09-16 ENCOUNTER — TRANSCRIPTION ENCOUNTER (OUTPATIENT)
Age: 59
End: 2024-09-16

## 2024-09-26 ENCOUNTER — TRANSCRIPTION ENCOUNTER (OUTPATIENT)
Age: 59
End: 2024-09-26

## 2024-10-04 ENCOUNTER — TRANSCRIPTION ENCOUNTER (OUTPATIENT)
Age: 59
End: 2024-10-04

## 2024-10-10 ENCOUNTER — EMERGENCY (EMERGENCY)
Facility: HOSPITAL | Age: 59
LOS: 1 days | Discharge: HOME | End: 2024-10-10
Attending: EMERGENCY MEDICINE
Payer: COMMERCIAL

## 2024-10-10 VITALS
TEMPERATURE: 98 F | SYSTOLIC BLOOD PRESSURE: 106 MMHG | OXYGEN SATURATION: 98 % | DIASTOLIC BLOOD PRESSURE: 64 MMHG | RESPIRATION RATE: 18 BRPM | HEART RATE: 66 BPM

## 2024-10-10 VITALS
TEMPERATURE: 98 F | HEIGHT: 70 IN | DIASTOLIC BLOOD PRESSURE: 76 MMHG | SYSTOLIC BLOOD PRESSURE: 112 MMHG | WEIGHT: 173.06 LBS | RESPIRATION RATE: 18 BRPM | OXYGEN SATURATION: 98 % | HEART RATE: 64 BPM

## 2024-10-10 DIAGNOSIS — Z98.890 OTHER SPECIFIED POSTPROCEDURAL STATES: Chronic | ICD-10-CM

## 2024-10-10 DIAGNOSIS — Z90.49 ACQUIRED ABSENCE OF OTHER SPECIFIED PARTS OF DIGESTIVE TRACT: Chronic | ICD-10-CM

## 2024-10-10 LAB
ALBUMIN SERPL ELPH-MCNC: 4.5 G/DL — SIGNIFICANT CHANGE UP (ref 3.3–5)
ALP SERPL-CCNC: 48 U/L — SIGNIFICANT CHANGE UP (ref 40–120)
ALT FLD-CCNC: 9 U/L — LOW (ref 10–45)
ANION GAP SERPL CALC-SCNC: 10 MMOL/L — SIGNIFICANT CHANGE UP (ref 5–17)
APTT BLD: 31.1 SEC — SIGNIFICANT CHANGE UP (ref 24.5–35.6)
AST SERPL-CCNC: 13 U/L — SIGNIFICANT CHANGE UP (ref 10–40)
BASOPHILS # BLD AUTO: 0.03 K/UL — SIGNIFICANT CHANGE UP (ref 0–0.2)
BASOPHILS NFR BLD AUTO: 0.5 % — SIGNIFICANT CHANGE UP (ref 0–2)
BILIRUB SERPL-MCNC: 0.3 MG/DL — SIGNIFICANT CHANGE UP (ref 0.2–1.2)
BUN SERPL-MCNC: 13 MG/DL — SIGNIFICANT CHANGE UP (ref 7–23)
CALCIUM SERPL-MCNC: 9.3 MG/DL — SIGNIFICANT CHANGE UP (ref 8.4–10.5)
CHLORIDE SERPL-SCNC: 106 MMOL/L — SIGNIFICANT CHANGE UP (ref 96–108)
CO2 SERPL-SCNC: 26 MMOL/L — SIGNIFICANT CHANGE UP (ref 22–31)
CREAT SERPL-MCNC: 0.73 MG/DL — SIGNIFICANT CHANGE UP (ref 0.5–1.3)
EGFR: 96 ML/MIN/1.73M2 — SIGNIFICANT CHANGE UP
EOSINOPHIL # BLD AUTO: 0.13 K/UL — SIGNIFICANT CHANGE UP (ref 0–0.5)
EOSINOPHIL NFR BLD AUTO: 2 % — SIGNIFICANT CHANGE UP (ref 0–6)
GLUCOSE SERPL-MCNC: 78 MG/DL — SIGNIFICANT CHANGE UP (ref 70–99)
HCT VFR BLD CALC: 39.4 % — SIGNIFICANT CHANGE UP (ref 34.5–45)
HGB BLD-MCNC: 12.8 G/DL — SIGNIFICANT CHANGE UP (ref 11.5–15.5)
IMM GRANULOCYTES NFR BLD AUTO: 0.3 % — SIGNIFICANT CHANGE UP (ref 0–0.9)
INR BLD: 1.03 RATIO — SIGNIFICANT CHANGE UP (ref 0.85–1.16)
LYMPHOCYTES # BLD AUTO: 2.72 K/UL — SIGNIFICANT CHANGE UP (ref 1–3.3)
LYMPHOCYTES # BLD AUTO: 42.1 % — SIGNIFICANT CHANGE UP (ref 13–44)
MCHC RBC-ENTMCNC: 28.8 PG — SIGNIFICANT CHANGE UP (ref 27–34)
MCHC RBC-ENTMCNC: 32.5 GM/DL — SIGNIFICANT CHANGE UP (ref 32–36)
MCV RBC AUTO: 88.5 FL — SIGNIFICANT CHANGE UP (ref 80–100)
MONOCYTES # BLD AUTO: 0.48 K/UL — SIGNIFICANT CHANGE UP (ref 0–0.9)
MONOCYTES NFR BLD AUTO: 7.4 % — SIGNIFICANT CHANGE UP (ref 2–14)
NEUTROPHILS # BLD AUTO: 3.08 K/UL — SIGNIFICANT CHANGE UP (ref 1.8–7.4)
NEUTROPHILS NFR BLD AUTO: 47.7 % — SIGNIFICANT CHANGE UP (ref 43–77)
NRBC # BLD: 0 /100 WBCS — SIGNIFICANT CHANGE UP (ref 0–0)
PLATELET # BLD AUTO: 291 K/UL — SIGNIFICANT CHANGE UP (ref 150–400)
POTASSIUM SERPL-MCNC: 4.3 MMOL/L — SIGNIFICANT CHANGE UP (ref 3.5–5.3)
POTASSIUM SERPL-SCNC: 4.3 MMOL/L — SIGNIFICANT CHANGE UP (ref 3.5–5.3)
PROT SERPL-MCNC: 7.2 G/DL — SIGNIFICANT CHANGE UP (ref 6–8.3)
PROTHROM AB SERPL-ACNC: 11.7 SEC — SIGNIFICANT CHANGE UP (ref 9.9–13.4)
RBC # BLD: 4.45 M/UL — SIGNIFICANT CHANGE UP (ref 3.8–5.2)
RBC # FLD: 12.1 % — SIGNIFICANT CHANGE UP (ref 10.3–14.5)
SODIUM SERPL-SCNC: 142 MMOL/L — SIGNIFICANT CHANGE UP (ref 135–145)
TROPONIN T, HIGH SENSITIVITY RESULT: <6 NG/L — SIGNIFICANT CHANGE UP (ref 0–51)
WBC # BLD: 6.46 K/UL — SIGNIFICANT CHANGE UP (ref 3.8–10.5)
WBC # FLD AUTO: 6.46 K/UL — SIGNIFICANT CHANGE UP (ref 3.8–10.5)

## 2024-10-10 PROCEDURE — 71275 CT ANGIOGRAPHY CHEST: CPT | Mod: 26,MC

## 2024-10-10 PROCEDURE — 85730 THROMBOPLASTIN TIME PARTIAL: CPT

## 2024-10-10 PROCEDURE — 80053 COMPREHEN METABOLIC PANEL: CPT

## 2024-10-10 PROCEDURE — 85610 PROTHROMBIN TIME: CPT

## 2024-10-10 PROCEDURE — 93005 ELECTROCARDIOGRAM TRACING: CPT

## 2024-10-10 PROCEDURE — 84484 ASSAY OF TROPONIN QUANT: CPT

## 2024-10-10 PROCEDURE — 96374 THER/PROPH/DIAG INJ IV PUSH: CPT | Mod: XU

## 2024-10-10 PROCEDURE — 71275 CT ANGIOGRAPHY CHEST: CPT | Mod: MC

## 2024-10-10 PROCEDURE — 71045 X-RAY EXAM CHEST 1 VIEW: CPT

## 2024-10-10 PROCEDURE — 71045 X-RAY EXAM CHEST 1 VIEW: CPT | Mod: 26

## 2024-10-10 PROCEDURE — 99285 EMERGENCY DEPT VISIT HI MDM: CPT

## 2024-10-10 PROCEDURE — 99285 EMERGENCY DEPT VISIT HI MDM: CPT | Mod: 25

## 2024-10-10 PROCEDURE — 85025 COMPLETE CBC W/AUTO DIFF WBC: CPT

## 2024-10-10 RX ORDER — KETOROLAC TROMETHAMINE 10 MG/1
15 TABLET, FILM COATED ORAL ONCE
Refills: 0 | Status: DISCONTINUED | OUTPATIENT
Start: 2024-10-10 | End: 2024-10-10

## 2024-10-10 RX ADMIN — KETOROLAC TROMETHAMINE 15 MILLIGRAM(S): 10 TABLET, FILM COATED ORAL at 22:06

## 2024-10-10 NOTE — ED PROVIDER NOTE - NSFOLLOWUPINSTRUCTIONS_ED_ALL_ED_FT
Today you were seen in the emergency department for evaluation of chest pain.     Chest pain can be caused by many different conditions which may or may not be dangerous. Causes include heartburn, lung infections, heart attack, blood clot in lungs, skin infections, strain or damage to muscle, cartilage, or bones, etc. In addition to a history and physical examination, an electrocardiogram (ECG) or other lab tests may have been performed to determine the cause of your chest pain. Follow up with your primary care provider or with a cardiologist as instructed.     Your labs were all normal, including the troponin which is an enzyme for the heat, and your CT scan was negative for blood clot.     Please follow up with your primary care doctor. Please also follow up with a cardiologist. We placed a referral from the emergency department for a cardiologist, someone will call you to  help set up this appointment.     SEEK IMMEDIATE MEDICAL CARE IF YOU HAVE ANY OF THE FOLLOWING SYMPTOMS: worsening chest pain, coughing up blood, unexplained back/neck/jaw pain, severe abdominal pain, dizziness or lightheadedness, fainting, shortness of breath, sweaty or clammy skin, vomiting, or racing heart beat. These symptoms may represent a serious problem that is an emergency. Do not wait to see if the symptoms will go away. Get medical help right away. Call 911 and do not drive yourself to the hospital.

## 2024-10-10 NOTE — ED PROVIDER NOTE - WR ORDER ID 1
42-year-old male, presents status post injury earlier today.  On exam neurovascular intact.  Suspicion for ligamentous injury.  X-rays show subluxation without dislocation or fracture.  No splint applied.  Patient will need to follow-up within 1 week with Ortho hand.
[id number]

## 2024-10-10 NOTE — ED PROVIDER NOTE - OBJECTIVE STATEMENT
This is a 59 year old female with PMH of previous PE's last year, the hematological workup for PE was negative, pneumonia last year, HLD, CAD, asthma, melanoma. She came to the ED today for chest pain which began yesterday, she states the chest pain radiates to her back and bilateral shoulders. The patient states she has inspiratory pain and shortness of breath. She endorses recent 12 hour car ride, and complains of left calf pain.  No fever, no chills, no cough.

## 2024-10-10 NOTE — ED PROVIDER NOTE - NSICDXPASTMEDICALHX_GEN_ALL_CORE_FT
PAST MEDICAL HISTORY:  Chronic GERD     H/O Prinzmetal angina     Hypertrophy of breast     Insomnia     Malignant melanoma, unspecified site R arm    Seasonal allergies      PAST MEDICAL HISTORY:  Chronic GERD     H/O Prinzmetal angina     Hypertrophy of breast     Insomnia     Malignant melanoma, unspecified site R arm    Pulmonary embolism     Seasonal allergies

## 2024-10-10 NOTE — ED PROVIDER NOTE - PATIENT PORTAL LINK FT
You can access the FollowMyHealth Patient Portal offered by [facility] by registering at the following website: [email or web address]. By joining [facility]’s FollowMyHealth portal, you will also be able to view your health information using other applications (apps) compatible with our system.

## 2024-10-10 NOTE — ED PROVIDER NOTE - PROGRESS NOTE DETAILS
feels fine cta no pe ,no pneumonia ,recommend NSAD and follow up dr Rivas pulmonary and dr De Dios Cardiology

## 2024-10-10 NOTE — ED ADULT NURSE NOTE - OBJECTIVE STATEMENT
59 yr old female with 911 complications , c/o chest pain rad jaw and l arm since yesterday. Pt  thought it was muscular Took Tylenol min relief. Today worsening SOB, cristina on exertion.  and bilat calf tenderness with LLE more swollen than right Has h/x PE lung last year on Eliquis with decreased dosage change. Pt also x 1 week ago drove to and from home in [place]. Pt c/o nausea Denies vomiting. On con't card monitoring RSR HR 77. Made comfortable 02 sat > 95 % Recently diagnosed with CAD and HLD. Denies fever or chills Per pt " I felt out of it since before I drove to [place]." Spouse at bedside. Resp even and nonlab.

## 2024-10-10 NOTE — ED PROVIDER NOTE - CLINICAL SUMMARY MEDICAL DECISION MAKING FREE TEXT BOX
This is a 59 year old female with history of 3 PEs on eliquis, malignant melanoma, patient was a  in 9/11 since that time has developed respiratory issues. She had a negative hematologic workup for PE. Presented with pleuritic chest pain since yesterday, with recent 12 hour travel from [place]. ECG  normal sinus rhythm and non specific ECG changes. Concern for PE, less likely angina. Will obtain blood work, CTA of chest and will reassess. JESSICA.

## 2024-10-11 ENCOUNTER — TRANSCRIPTION ENCOUNTER (OUTPATIENT)
Age: 59
End: 2024-10-11

## 2024-10-11 ENCOUNTER — APPOINTMENT (OUTPATIENT)
Dept: PULMONOLOGY | Facility: CLINIC | Age: 59
End: 2024-10-11
Payer: COMMERCIAL

## 2024-10-11 DIAGNOSIS — Z57.9 OCCUPATIONAL EXPOSURE TO UNSPECIFIED RISK FACTOR: ICD-10-CM

## 2024-10-11 DIAGNOSIS — J18.9 PNEUMONIA, UNSPECIFIED ORGANISM: ICD-10-CM

## 2024-10-11 DIAGNOSIS — J45.50 SEVERE PERSISTENT ASTHMA, UNCOMPLICATED: ICD-10-CM

## 2024-10-11 DIAGNOSIS — R05.3 CHRONIC COUGH: ICD-10-CM

## 2024-10-11 DIAGNOSIS — K21.9 GASTRO-ESOPHAGEAL REFLUX DISEASE W/OUT ESOPHAGITIS: ICD-10-CM

## 2024-10-11 DIAGNOSIS — R06.02 SHORTNESS OF BREATH: ICD-10-CM

## 2024-10-11 DIAGNOSIS — I26.99 OTHER PULMONARY EMBOLISM W/OUT ACUTE COR PULMONALE: ICD-10-CM

## 2024-10-11 DIAGNOSIS — R93.89 ABNORMAL FINDINGS ON DIAGNOSTIC IMAGING OF OTHER SPECIFIED BODY STRUCTURES: ICD-10-CM

## 2024-10-11 DIAGNOSIS — J45.909 UNSPECIFIED ASTHMA, UNCOMPLICATED: ICD-10-CM

## 2024-10-11 DIAGNOSIS — R09.81 NASAL CONGESTION: ICD-10-CM

## 2024-10-11 PROCEDURE — 99214 OFFICE O/P EST MOD 30 MIN: CPT

## 2024-10-11 RX ORDER — AZITHROMYCIN 500 MG/1
500 TABLET, FILM COATED ORAL DAILY
Qty: 7 | Refills: 0 | Status: ACTIVE | COMMUNITY
Start: 2024-10-11 | End: 1900-01-01

## 2024-10-11 RX ORDER — FLUTICASONE PROPIONATE 50 UG/1
50 SPRAY, METERED NASAL DAILY
Qty: 1 | Refills: 0 | Status: ACTIVE | COMMUNITY
Start: 2024-10-11 | End: 1900-01-01

## 2024-10-11 RX ORDER — PROMETHAZINE HYDROCHLORIDE AND DEXTROMETHORPHAN HYDROBROMIDE ORAL SOLUTION 15; 6.25 MG/5ML; MG/5ML
6.25-15 SOLUTION ORAL
Qty: 473 | Refills: 1 | Status: ACTIVE | COMMUNITY
Start: 2024-10-11 | End: 1900-01-01

## 2024-10-11 RX ORDER — AZITHROMYCIN 250 MG/1
250 TABLET, FILM COATED ORAL
Qty: 1 | Refills: 0 | Status: ACTIVE | COMMUNITY
Start: 2024-10-11 | End: 1900-01-01

## 2024-10-11 SDOH — HEALTH STABILITY - PHYSICAL HEALTH: OCCUPATIONAL EXPOSURE TO UNSPECIFIED RISK FACTOR: Z57.9

## 2024-10-14 ENCOUNTER — NON-APPOINTMENT (OUTPATIENT)
Age: 59
End: 2024-10-14

## 2024-10-14 ENCOUNTER — TRANSCRIPTION ENCOUNTER (OUTPATIENT)
Age: 59
End: 2024-10-14

## 2024-10-14 ENCOUNTER — APPOINTMENT (OUTPATIENT)
Dept: CARDIOLOGY | Facility: CLINIC | Age: 59
End: 2024-10-14
Payer: COMMERCIAL

## 2024-10-14 VITALS
OXYGEN SATURATION: 98 % | DIASTOLIC BLOOD PRESSURE: 70 MMHG | HEART RATE: 90 BPM | SYSTOLIC BLOOD PRESSURE: 124 MMHG | BODY MASS INDEX: 24.62 KG/M2 | HEIGHT: 70 IN | WEIGHT: 172 LBS

## 2024-10-14 PROCEDURE — 99214 OFFICE O/P EST MOD 30 MIN: CPT

## 2024-10-14 PROCEDURE — 93000 ELECTROCARDIOGRAM COMPLETE: CPT

## 2024-10-14 PROCEDURE — G2211 COMPLEX E/M VISIT ADD ON: CPT | Mod: NC

## 2024-10-15 ENCOUNTER — TRANSCRIPTION ENCOUNTER (OUTPATIENT)
Age: 59
End: 2024-10-15

## 2024-10-15 DIAGNOSIS — Z86.711 PERSONAL HISTORY OF PULMONARY EMBOLISM: ICD-10-CM

## 2024-10-24 ENCOUNTER — TRANSCRIPTION ENCOUNTER (OUTPATIENT)
Age: 59
End: 2024-10-24

## 2024-10-24 PROBLEM — I26.99 OTHER PULMONARY EMBOLISM WITHOUT ACUTE COR PULMONALE: Chronic | Status: ACTIVE | Noted: 2024-10-10

## 2024-10-24 RX ORDER — DEXLANSOPRAZOLE 60 MG/1
60 CAPSULE, DELAYED RELEASE ORAL
Qty: 90 | Refills: 1 | Status: ACTIVE | COMMUNITY
Start: 2024-10-24 | End: 1900-01-01

## 2024-10-30 ENCOUNTER — APPOINTMENT (OUTPATIENT)
Dept: CARDIOLOGY | Facility: CLINIC | Age: 59
End: 2024-10-30

## 2024-10-30 DIAGNOSIS — Z78.9 OTHER SPECIFIED HEALTH STATUS: ICD-10-CM

## 2024-10-30 DIAGNOSIS — I25.10 ATHEROSCLEROTIC HEART DISEASE OF NATIVE CORONARY ARTERY W/OUT ANGINA PECTORIS: ICD-10-CM

## 2024-10-30 DIAGNOSIS — E78.5 HYPERLIPIDEMIA, UNSPECIFIED: ICD-10-CM

## 2024-11-07 ENCOUNTER — APPOINTMENT (OUTPATIENT)
Dept: PULMONOLOGY | Facility: CLINIC | Age: 59
End: 2024-11-07
Payer: COMMERCIAL

## 2024-11-07 VITALS
BODY MASS INDEX: 25.77 KG/M2 | TEMPERATURE: 97.8 F | SYSTOLIC BLOOD PRESSURE: 118 MMHG | OXYGEN SATURATION: 97 % | HEIGHT: 69 IN | DIASTOLIC BLOOD PRESSURE: 82 MMHG | WEIGHT: 174 LBS | HEART RATE: 82 BPM | RESPIRATION RATE: 16 BRPM

## 2024-11-07 DIAGNOSIS — R06.02 SHORTNESS OF BREATH: ICD-10-CM

## 2024-11-07 DIAGNOSIS — J39.8 OTHER SPECIFIED DISEASES OF UPPER RESPIRATORY TRACT: ICD-10-CM

## 2024-11-07 DIAGNOSIS — Z86.711 PERSONAL HISTORY OF PULMONARY EMBOLISM: ICD-10-CM

## 2024-11-07 DIAGNOSIS — K21.9 GASTRO-ESOPHAGEAL REFLUX DISEASE W/OUT ESOPHAGITIS: ICD-10-CM

## 2024-11-07 DIAGNOSIS — J30.2 OTHER SEASONAL ALLERGIC RHINITIS: ICD-10-CM

## 2024-11-07 DIAGNOSIS — J45.50 SEVERE PERSISTENT ASTHMA, UNCOMPLICATED: ICD-10-CM

## 2024-11-07 DIAGNOSIS — R93.89 ABNORMAL FINDINGS ON DIAGNOSTIC IMAGING OF OTHER SPECIFIED BODY STRUCTURES: ICD-10-CM

## 2024-11-07 PROCEDURE — 99214 OFFICE O/P EST MOD 30 MIN: CPT | Mod: 25

## 2024-11-07 PROCEDURE — 94010 BREATHING CAPACITY TEST: CPT

## 2024-11-08 ENCOUNTER — RX RENEWAL (OUTPATIENT)
Age: 59
End: 2024-11-08

## 2024-11-08 RX ORDER — DUPILUMAB 300 MG/2ML
300 INJECTION, SOLUTION SUBCUTANEOUS
Qty: 4 | Refills: 1 | Status: ACTIVE | COMMUNITY
Start: 2024-11-08 | End: 1900-01-01

## 2024-11-11 ENCOUNTER — APPOINTMENT (OUTPATIENT)
Dept: OTHER | Facility: CLINIC | Age: 59
End: 2024-11-11

## 2024-11-12 ENCOUNTER — APPOINTMENT (OUTPATIENT)
Dept: CARDIOLOGY | Facility: CLINIC | Age: 59
End: 2024-11-12

## 2024-11-13 ENCOUNTER — APPOINTMENT (OUTPATIENT)
Dept: CARDIOLOGY | Facility: CLINIC | Age: 59
End: 2024-11-13

## 2024-11-18 ENCOUNTER — APPOINTMENT (OUTPATIENT)
Dept: CARDIOLOGY | Facility: CLINIC | Age: 59
End: 2024-11-18
Payer: COMMERCIAL

## 2024-11-18 ENCOUNTER — NON-APPOINTMENT (OUTPATIENT)
Age: 59
End: 2024-11-18

## 2024-11-18 VITALS
WEIGHT: 170 LBS | HEART RATE: 89 BPM | BODY MASS INDEX: 25.18 KG/M2 | SYSTOLIC BLOOD PRESSURE: 110 MMHG | DIASTOLIC BLOOD PRESSURE: 80 MMHG | HEIGHT: 69 IN | OXYGEN SATURATION: 98 %

## 2024-11-18 DIAGNOSIS — E78.5 HYPERLIPIDEMIA, UNSPECIFIED: ICD-10-CM

## 2024-11-18 DIAGNOSIS — I25.10 ATHEROSCLEROTIC HEART DISEASE OF NATIVE CORONARY ARTERY W/OUT ANGINA PECTORIS: ICD-10-CM

## 2024-11-18 PROCEDURE — 93306 TTE W/DOPPLER COMPLETE: CPT

## 2024-11-18 PROCEDURE — 93000 ELECTROCARDIOGRAM COMPLETE: CPT

## 2024-11-18 PROCEDURE — 99214 OFFICE O/P EST MOD 30 MIN: CPT | Mod: 25

## 2024-11-18 PROCEDURE — 96372 THER/PROPH/DIAG INJ SC/IM: CPT

## 2024-11-18 RX ORDER — INCLISIRAN 284 MG/1.5ML
284 INJECTION, SOLUTION SUBCUTANEOUS
Refills: 0 | Status: COMPLETED | OUTPATIENT
Start: 2024-11-18

## 2024-11-18 RX ADMIN — INCLISIRAN 284 MG/1.5ML: 284 INJECTION, SOLUTION SUBCUTANEOUS at 00:00

## 2024-11-20 ENCOUNTER — TRANSCRIPTION ENCOUNTER (OUTPATIENT)
Age: 59
End: 2024-11-20

## 2024-11-26 ENCOUNTER — TRANSCRIPTION ENCOUNTER (OUTPATIENT)
Age: 59
End: 2024-11-26

## 2024-12-17 ENCOUNTER — TRANSCRIPTION ENCOUNTER (OUTPATIENT)
Age: 59
End: 2024-12-17

## 2024-12-19 ENCOUNTER — APPOINTMENT (OUTPATIENT)
Dept: PULMONOLOGY | Facility: CLINIC | Age: 59
End: 2024-12-19
Payer: COMMERCIAL

## 2024-12-19 ENCOUNTER — TRANSCRIPTION ENCOUNTER (OUTPATIENT)
Age: 59
End: 2024-12-19

## 2024-12-19 DIAGNOSIS — J32.9 CHRONIC SINUSITIS, UNSPECIFIED: ICD-10-CM

## 2024-12-19 DIAGNOSIS — Z86.711 PERSONAL HISTORY OF PULMONARY EMBOLISM: ICD-10-CM

## 2024-12-19 DIAGNOSIS — J31.0 CHRONIC RHINITIS: ICD-10-CM

## 2024-12-19 PROCEDURE — 99213 OFFICE O/P EST LOW 20 MIN: CPT

## 2024-12-20 ENCOUNTER — TRANSCRIPTION ENCOUNTER (OUTPATIENT)
Age: 59
End: 2024-12-20

## 2024-12-20 LAB
HCOV 229E+HKU1+NL63+OC43 NPH QL NAA+PR: DETECTED
RAPID RVP RESULT: DETECTED
SARS-COV-2 RNA RESP QL NAA+PROBE: NOT DETECTED

## 2024-12-27 ENCOUNTER — TRANSCRIPTION ENCOUNTER (OUTPATIENT)
Age: 59
End: 2024-12-27

## 2024-12-28 ENCOUNTER — APPOINTMENT (OUTPATIENT)
Dept: RADIOLOGY | Facility: CLINIC | Age: 59
End: 2024-12-28
Payer: COMMERCIAL

## 2024-12-28 PROCEDURE — 71046 X-RAY EXAM CHEST 2 VIEWS: CPT

## 2024-12-31 ENCOUNTER — TRANSCRIPTION ENCOUNTER (OUTPATIENT)
Age: 59
End: 2024-12-31

## 2025-01-06 ENCOUNTER — RX RENEWAL (OUTPATIENT)
Age: 60
End: 2025-01-06

## 2025-01-23 ENCOUNTER — APPOINTMENT (OUTPATIENT)
Dept: OTHER | Facility: CLINIC | Age: 60
End: 2025-01-23
Payer: COMMERCIAL

## 2025-01-23 DIAGNOSIS — J45.50 SEVERE PERSISTENT ASTHMA, UNCOMPLICATED: ICD-10-CM

## 2025-01-23 DIAGNOSIS — Z04.9 ENCOUNTER FOR EXAMINATION AND OBSERVATION FOR UNSPECIFIED REASON: ICD-10-CM

## 2025-01-23 DIAGNOSIS — C44.310 BASAL CELL CARCINOMA OF SKIN OF UNSPECIFIED PARTS OF FACE: ICD-10-CM

## 2025-01-23 DIAGNOSIS — J32.9 CHRONIC SINUSITIS, UNSPECIFIED: ICD-10-CM

## 2025-01-23 DIAGNOSIS — K21.9 GASTRO-ESOPHAGEAL REFLUX DISEASE W/OUT ESOPHAGITIS: ICD-10-CM

## 2025-01-23 PROCEDURE — 99396 PREV VISIT EST AGE 40-64: CPT | Mod: 95

## 2025-01-23 PROCEDURE — 99215 OFFICE O/P EST HI 40 MIN: CPT | Mod: 95,25

## 2025-01-24 ENCOUNTER — TRANSCRIPTION ENCOUNTER (OUTPATIENT)
Age: 60
End: 2025-01-24

## 2025-01-27 ENCOUNTER — NON-APPOINTMENT (OUTPATIENT)
Age: 60
End: 2025-01-27

## 2025-01-27 ENCOUNTER — APPOINTMENT (OUTPATIENT)
Dept: PULMONOLOGY | Facility: CLINIC | Age: 60
End: 2025-01-27
Payer: COMMERCIAL

## 2025-01-27 ENCOUNTER — TRANSCRIPTION ENCOUNTER (OUTPATIENT)
Age: 60
End: 2025-01-27

## 2025-01-27 VITALS
DIASTOLIC BLOOD PRESSURE: 80 MMHG | TEMPERATURE: 97.4 F | WEIGHT: 175 LBS | OXYGEN SATURATION: 99 % | RESPIRATION RATE: 16 BRPM | HEIGHT: 69 IN | HEART RATE: 83 BPM | SYSTOLIC BLOOD PRESSURE: 108 MMHG | BODY MASS INDEX: 25.92 KG/M2

## 2025-01-27 DIAGNOSIS — R05.3 CHRONIC COUGH: ICD-10-CM

## 2025-01-27 DIAGNOSIS — Z57.9 OCCUPATIONAL EXPOSURE TO UNSPECIFIED RISK FACTOR: ICD-10-CM

## 2025-01-27 DIAGNOSIS — J45.909 UNSPECIFIED ASTHMA, UNCOMPLICATED: ICD-10-CM

## 2025-01-27 DIAGNOSIS — R06.02 SHORTNESS OF BREATH: ICD-10-CM

## 2025-01-27 PROCEDURE — 71046 X-RAY EXAM CHEST 2 VIEWS: CPT

## 2025-01-27 PROCEDURE — 99214 OFFICE O/P EST MOD 30 MIN: CPT | Mod: 25

## 2025-01-27 RX ORDER — PREDNISONE 10 MG/1
10 TABLET ORAL
Qty: 21 | Refills: 0 | Status: ACTIVE | COMMUNITY
Start: 2025-01-27 | End: 1900-01-01

## 2025-01-27 RX ORDER — AZITHROMYCIN 500 MG/1
500 TABLET, FILM COATED ORAL DAILY
Qty: 7 | Refills: 0 | Status: ACTIVE | COMMUNITY
Start: 2025-01-27 | End: 1900-01-01

## 2025-01-27 SDOH — HEALTH STABILITY - PHYSICAL HEALTH: OCCUPATIONAL EXPOSURE TO UNSPECIFIED RISK FACTOR: Z57.9

## 2025-01-28 LAB
RAPID RVP RESULT: NOT DETECTED
SARS-COV-2 RNA RESP QL NAA+PROBE: NOT DETECTED

## 2025-02-03 ENCOUNTER — TRANSCRIPTION ENCOUNTER (OUTPATIENT)
Age: 60
End: 2025-02-03

## 2025-02-03 RX ORDER — MOMETASONE FUROATE AND FORMOTEROL FUMARATE DIHYDRATE 200; 5 UG/1; UG/1
200-5 AEROSOL RESPIRATORY (INHALATION)
Qty: 3 | Refills: 1 | Status: DISCONTINUED | COMMUNITY
Start: 2025-02-03 | End: 2025-02-03

## 2025-02-04 ENCOUNTER — TRANSCRIPTION ENCOUNTER (OUTPATIENT)
Age: 60
End: 2025-02-04

## 2025-02-04 RX ORDER — FLUTICASONE FUROATE AND VILANTEROL TRIFENATATE 200; 25 UG/1; UG/1
200-25 POWDER RESPIRATORY (INHALATION)
Qty: 3 | Refills: 1 | Status: DISCONTINUED | COMMUNITY
Start: 2025-02-03 | End: 2025-02-04

## 2025-02-04 RX ORDER — BUDESONIDE AND FORMOTEROL FUMARATE DIHYDRATE 160; 4.5 UG/1; UG/1
160-4.5 AEROSOL RESPIRATORY (INHALATION) TWICE DAILY
Qty: 1 | Refills: 3 | Status: ACTIVE | COMMUNITY
Start: 2025-02-04 | End: 1900-01-01

## 2025-02-12 ENCOUNTER — TRANSCRIPTION ENCOUNTER (OUTPATIENT)
Age: 60
End: 2025-02-12

## 2025-02-12 RX ORDER — CICLESONIDE 160 UG/1
160 AEROSOL, METERED RESPIRATORY (INHALATION) TWICE DAILY
Qty: 1 | Refills: 3 | Status: ACTIVE | COMMUNITY
Start: 2025-02-12 | End: 1900-01-01

## 2025-02-12 RX ORDER — BECLOMETHASONE DIPROPIONATE HFA 80 UG/1
80 AEROSOL, METERED RESPIRATORY (INHALATION) TWICE DAILY
Qty: 1 | Refills: 3 | Status: ACTIVE | COMMUNITY
Start: 2025-02-12 | End: 1900-01-01

## 2025-02-12 RX ORDER — FLUTICASONE PROPIONATE AND SALMETEROL XINAFOATE 230; 21 UG/1; UG/1
230-21 AEROSOL, METERED RESPIRATORY (INHALATION)
Qty: 1 | Refills: 3 | Status: DISCONTINUED | COMMUNITY
Start: 2025-02-04 | End: 2025-02-12

## 2025-02-18 ENCOUNTER — APPOINTMENT (OUTPATIENT)
Dept: CARDIOLOGY | Facility: CLINIC | Age: 60
End: 2025-02-18

## 2025-02-20 ENCOUNTER — NON-APPOINTMENT (OUTPATIENT)
Age: 60
End: 2025-02-20

## 2025-02-20 ENCOUNTER — APPOINTMENT (OUTPATIENT)
Dept: CARDIOLOGY | Facility: CLINIC | Age: 60
End: 2025-02-20
Payer: COMMERCIAL

## 2025-02-20 VITALS
WEIGHT: 171 LBS | OXYGEN SATURATION: 99 % | BODY MASS INDEX: 25.33 KG/M2 | HEART RATE: 72 BPM | SYSTOLIC BLOOD PRESSURE: 110 MMHG | HEIGHT: 69 IN | DIASTOLIC BLOOD PRESSURE: 76 MMHG

## 2025-02-20 DIAGNOSIS — I26.99 OTHER PULMONARY EMBOLISM W/OUT ACUTE COR PULMONALE: ICD-10-CM

## 2025-02-20 PROCEDURE — 99214 OFFICE O/P EST MOD 30 MIN: CPT

## 2025-02-20 PROCEDURE — G2211 COMPLEX E/M VISIT ADD ON: CPT | Mod: NC

## 2025-02-24 ENCOUNTER — RX RENEWAL (OUTPATIENT)
Age: 60
End: 2025-02-24

## 2025-02-26 ENCOUNTER — TRANSCRIPTION ENCOUNTER (OUTPATIENT)
Age: 60
End: 2025-02-26

## 2025-02-28 ENCOUNTER — TRANSCRIPTION ENCOUNTER (OUTPATIENT)
Age: 60
End: 2025-02-28

## 2025-03-03 ENCOUNTER — TRANSCRIPTION ENCOUNTER (OUTPATIENT)
Age: 60
End: 2025-03-03

## 2025-03-04 ENCOUNTER — TRANSCRIPTION ENCOUNTER (OUTPATIENT)
Age: 60
End: 2025-03-04

## 2025-03-06 ENCOUNTER — APPOINTMENT (OUTPATIENT)
Dept: CARDIOLOGY | Facility: CLINIC | Age: 60
End: 2025-03-06

## 2025-03-06 ENCOUNTER — TRANSCRIPTION ENCOUNTER (OUTPATIENT)
Age: 60
End: 2025-03-06

## 2025-03-06 DIAGNOSIS — E78.5 HYPERLIPIDEMIA, UNSPECIFIED: ICD-10-CM

## 2025-03-06 DIAGNOSIS — Z78.9 OTHER SPECIFIED HEALTH STATUS: ICD-10-CM

## 2025-03-10 ENCOUNTER — RX RENEWAL (OUTPATIENT)
Age: 60
End: 2025-03-10

## 2025-03-25 ENCOUNTER — TRANSCRIPTION ENCOUNTER (OUTPATIENT)
Age: 60
End: 2025-03-25

## 2025-03-29 ENCOUNTER — EMERGENCY (EMERGENCY)
Facility: HOSPITAL | Age: 60
LOS: 1 days | Discharge: HOME | End: 2025-03-29
Attending: EMERGENCY MEDICINE
Payer: COMMERCIAL

## 2025-03-29 VITALS
HEART RATE: 116 BPM | SYSTOLIC BLOOD PRESSURE: 116 MMHG | DIASTOLIC BLOOD PRESSURE: 79 MMHG | RESPIRATION RATE: 20 BRPM | HEIGHT: 70 IN | WEIGHT: 169.09 LBS | OXYGEN SATURATION: 99 % | TEMPERATURE: 98 F

## 2025-03-29 VITALS
DIASTOLIC BLOOD PRESSURE: 62 MMHG | SYSTOLIC BLOOD PRESSURE: 110 MMHG | HEART RATE: 85 BPM | TEMPERATURE: 98 F | OXYGEN SATURATION: 98 % | RESPIRATION RATE: 16 BRPM

## 2025-03-29 DIAGNOSIS — Z98.890 OTHER SPECIFIED POSTPROCEDURAL STATES: Chronic | ICD-10-CM

## 2025-03-29 DIAGNOSIS — Z90.49 ACQUIRED ABSENCE OF OTHER SPECIFIED PARTS OF DIGESTIVE TRACT: Chronic | ICD-10-CM

## 2025-03-29 LAB
ALBUMIN SERPL ELPH-MCNC: 4.8 G/DL — SIGNIFICANT CHANGE UP (ref 3.3–5)
ALP SERPL-CCNC: 53 U/L — SIGNIFICANT CHANGE UP (ref 40–120)
ALT FLD-CCNC: 12 U/L — SIGNIFICANT CHANGE UP (ref 10–45)
ANION GAP SERPL CALC-SCNC: 17 MMOL/L — SIGNIFICANT CHANGE UP (ref 5–17)
APTT BLD: 28.9 SEC — SIGNIFICANT CHANGE UP (ref 24.5–35.6)
AST SERPL-CCNC: 18 U/L — SIGNIFICANT CHANGE UP (ref 10–40)
BASOPHILS # BLD AUTO: 0.03 K/UL — SIGNIFICANT CHANGE UP (ref 0–0.2)
BASOPHILS NFR BLD AUTO: 0.3 % — SIGNIFICANT CHANGE UP (ref 0–2)
BILIRUB SERPL-MCNC: 1.3 MG/DL — HIGH (ref 0.2–1.2)
BUN SERPL-MCNC: 18 MG/DL — SIGNIFICANT CHANGE UP (ref 7–23)
CALCIUM SERPL-MCNC: 9.6 MG/DL — SIGNIFICANT CHANGE UP (ref 8.4–10.5)
CHLORIDE SERPL-SCNC: 102 MMOL/L — SIGNIFICANT CHANGE UP (ref 96–108)
CO2 SERPL-SCNC: 21 MMOL/L — LOW (ref 22–31)
CREAT SERPL-MCNC: 0.57 MG/DL — SIGNIFICANT CHANGE UP (ref 0.5–1.3)
EGFR: 106 ML/MIN/1.73M2 — SIGNIFICANT CHANGE UP
EGFR: 106 ML/MIN/1.73M2 — SIGNIFICANT CHANGE UP
EOSINOPHIL # BLD AUTO: 0.02 K/UL — SIGNIFICANT CHANGE UP (ref 0–0.5)
EOSINOPHIL NFR BLD AUTO: 0.2 % — SIGNIFICANT CHANGE UP (ref 0–6)
GAS PNL BLDV: SIGNIFICANT CHANGE UP
GLUCOSE SERPL-MCNC: 107 MG/DL — HIGH (ref 70–99)
HCT VFR BLD CALC: 42.1 % — SIGNIFICANT CHANGE UP (ref 34.5–45)
HGB BLD-MCNC: 14.2 G/DL — SIGNIFICANT CHANGE UP (ref 11.5–15.5)
IMM GRANULOCYTES NFR BLD AUTO: 0.2 % — SIGNIFICANT CHANGE UP (ref 0–0.9)
INR BLD: 0.97 RATIO — SIGNIFICANT CHANGE UP (ref 0.85–1.16)
LIDOCAIN IGE QN: 33 U/L — SIGNIFICANT CHANGE UP (ref 7–60)
LYMPHOCYTES # BLD AUTO: 0.45 K/UL — LOW (ref 1–3.3)
LYMPHOCYTES # BLD AUTO: 4.8 % — LOW (ref 13–44)
MCHC RBC-ENTMCNC: 29.8 PG — SIGNIFICANT CHANGE UP (ref 27–34)
MCHC RBC-ENTMCNC: 33.7 G/DL — SIGNIFICANT CHANGE UP (ref 32–36)
MCV RBC AUTO: 88.3 FL — SIGNIFICANT CHANGE UP (ref 80–100)
MONOCYTES # BLD AUTO: 0.37 K/UL — SIGNIFICANT CHANGE UP (ref 0–0.9)
MONOCYTES NFR BLD AUTO: 4 % — SIGNIFICANT CHANGE UP (ref 2–14)
NEUTROPHILS # BLD AUTO: 8.44 K/UL — HIGH (ref 1.8–7.4)
NEUTROPHILS NFR BLD AUTO: 90.5 % — HIGH (ref 43–77)
NRBC BLD AUTO-RTO: 0 /100 WBCS — SIGNIFICANT CHANGE UP (ref 0–0)
PLATELET # BLD AUTO: 272 K/UL — SIGNIFICANT CHANGE UP (ref 150–400)
POTASSIUM SERPL-MCNC: 3.8 MMOL/L — SIGNIFICANT CHANGE UP (ref 3.5–5.3)
POTASSIUM SERPL-SCNC: 3.8 MMOL/L — SIGNIFICANT CHANGE UP (ref 3.5–5.3)
PROT SERPL-MCNC: 7.6 G/DL — SIGNIFICANT CHANGE UP (ref 6–8.3)
PROTHROM AB SERPL-ACNC: 11.2 SEC — SIGNIFICANT CHANGE UP (ref 9.9–13.4)
RBC # BLD: 4.77 M/UL — SIGNIFICANT CHANGE UP (ref 3.8–5.2)
RBC # FLD: 12.1 % — SIGNIFICANT CHANGE UP (ref 10.3–14.5)
SODIUM SERPL-SCNC: 140 MMOL/L — SIGNIFICANT CHANGE UP (ref 135–145)
TROPONIN T, HIGH SENSITIVITY RESULT: <6 NG/L — SIGNIFICANT CHANGE UP (ref 0–51)
TROPONIN T, HIGH SENSITIVITY RESULT: <6 NG/L — SIGNIFICANT CHANGE UP (ref 0–51)
WBC # BLD: 9.33 K/UL — SIGNIFICANT CHANGE UP (ref 3.8–10.5)
WBC # FLD AUTO: 9.33 K/UL — SIGNIFICANT CHANGE UP (ref 3.8–10.5)

## 2025-03-29 PROCEDURE — 84484 ASSAY OF TROPONIN QUANT: CPT

## 2025-03-29 PROCEDURE — 96374 THER/PROPH/DIAG INJ IV PUSH: CPT | Mod: XU

## 2025-03-29 PROCEDURE — 85610 PROTHROMBIN TIME: CPT

## 2025-03-29 PROCEDURE — 86901 BLOOD TYPING SEROLOGIC RH(D): CPT

## 2025-03-29 PROCEDURE — 86850 RBC ANTIBODY SCREEN: CPT

## 2025-03-29 PROCEDURE — 93010 ELECTROCARDIOGRAM REPORT: CPT

## 2025-03-29 PROCEDURE — 85730 THROMBOPLASTIN TIME PARTIAL: CPT

## 2025-03-29 PROCEDURE — 84295 ASSAY OF SERUM SODIUM: CPT

## 2025-03-29 PROCEDURE — 74177 CT ABD & PELVIS W/CONTRAST: CPT | Mod: MC

## 2025-03-29 PROCEDURE — 80053 COMPREHEN METABOLIC PANEL: CPT

## 2025-03-29 PROCEDURE — 85025 COMPLETE CBC W/AUTO DIFF WBC: CPT

## 2025-03-29 PROCEDURE — 99285 EMERGENCY DEPT VISIT HI MDM: CPT | Mod: 25

## 2025-03-29 PROCEDURE — 82803 BLOOD GASES ANY COMBINATION: CPT

## 2025-03-29 PROCEDURE — 84132 ASSAY OF SERUM POTASSIUM: CPT

## 2025-03-29 PROCEDURE — 83605 ASSAY OF LACTIC ACID: CPT

## 2025-03-29 PROCEDURE — 93005 ELECTROCARDIOGRAM TRACING: CPT

## 2025-03-29 PROCEDURE — 96375 TX/PRO/DX INJ NEW DRUG ADDON: CPT

## 2025-03-29 PROCEDURE — 82947 ASSAY GLUCOSE BLOOD QUANT: CPT

## 2025-03-29 PROCEDURE — 99285 EMERGENCY DEPT VISIT HI MDM: CPT

## 2025-03-29 PROCEDURE — 83690 ASSAY OF LIPASE: CPT

## 2025-03-29 PROCEDURE — 85018 HEMOGLOBIN: CPT

## 2025-03-29 PROCEDURE — 71045 X-RAY EXAM CHEST 1 VIEW: CPT

## 2025-03-29 PROCEDURE — 82435 ASSAY OF BLOOD CHLORIDE: CPT

## 2025-03-29 PROCEDURE — 71045 X-RAY EXAM CHEST 1 VIEW: CPT | Mod: 26

## 2025-03-29 PROCEDURE — 82330 ASSAY OF CALCIUM: CPT

## 2025-03-29 PROCEDURE — 74177 CT ABD & PELVIS W/CONTRAST: CPT | Mod: 26

## 2025-03-29 PROCEDURE — 85014 HEMATOCRIT: CPT

## 2025-03-29 PROCEDURE — 86900 BLOOD TYPING SEROLOGIC ABO: CPT

## 2025-03-29 RX ORDER — METHYLPREDNISOLONE ACETATE 80 MG/ML
40 INJECTION, SUSPENSION INTRA-ARTICULAR; INTRALESIONAL; INTRAMUSCULAR; SOFT TISSUE ONCE
Refills: 0 | Status: COMPLETED | OUTPATIENT
Start: 2025-03-29 | End: 2025-03-29

## 2025-03-29 RX ORDER — ONDANSETRON HCL/PF 4 MG/2 ML
4 VIAL (ML) INJECTION ONCE
Refills: 0 | Status: COMPLETED | OUTPATIENT
Start: 2025-03-29 | End: 2025-03-29

## 2025-03-29 RX ORDER — ONDANSETRON HCL/PF 4 MG/2 ML
1 VIAL (ML) INJECTION
Qty: 1 | Refills: 0
Start: 2025-03-29

## 2025-03-29 RX ORDER — ACETAMINOPHEN 500 MG/5ML
1000 LIQUID (ML) ORAL ONCE
Refills: 0 | Status: COMPLETED | OUTPATIENT
Start: 2025-03-29 | End: 2025-03-29

## 2025-03-29 RX ORDER — DIPHENHYDRAMINE HCL 12.5MG/5ML
50 ELIXIR ORAL ONCE
Refills: 0 | Status: COMPLETED | OUTPATIENT
Start: 2025-03-29 | End: 2025-03-29

## 2025-03-29 RX ORDER — METOCLOPRAMIDE HCL 10 MG
10 TABLET ORAL ONCE
Refills: 0 | Status: COMPLETED | OUTPATIENT
Start: 2025-03-29 | End: 2025-03-29

## 2025-03-29 RX ADMIN — Medication 1000 MILLILITER(S): at 10:02

## 2025-03-29 RX ADMIN — Medication 10 MILLIGRAM(S): at 13:28

## 2025-03-29 RX ADMIN — METHYLPREDNISOLONE ACETATE 40 MILLIGRAM(S): 80 INJECTION, SUSPENSION INTRA-ARTICULAR; INTRALESIONAL; INTRAMUSCULAR; SOFT TISSUE at 10:01

## 2025-03-29 RX ADMIN — Medication 4 MILLIGRAM(S): at 10:02

## 2025-03-29 RX ADMIN — Medication 400 MILLIGRAM(S): at 10:02

## 2025-03-29 RX ADMIN — Medication 50 MILLIGRAM(S): at 12:55

## 2025-03-29 NOTE — ED PROVIDER NOTE - PROGRESS NOTE DETAILS
TOBIAS Hewitt PGY2- no acute actionable CT scan findings, largely unchanged from prior exam. patient tolerating PO. sent more zofran to pharmacy. will dc

## 2025-03-29 NOTE — ED PROVIDER NOTE - ATTENDING CONTRIBUTION TO CARE
59-year-old female history of prior pulmonary embolism on Eliquis, CAD without stents, prior cholecystectomy, pancreatic mass noncancerous, Prinzmetal's angina presenting to the emergency room with complaint of abdominal pain for the past 5 days located in the right lower quadrant which has been worsening.  Patient began to have nausea and emesis approximately 12 AM and has had multiple episodes since that time nonbloody nonbilious.  Denies any fever or chills.  Does report onset of chest pain while en route to the hospital which she describes as intermittent left-sided chest pressure without associated symptoms of shortness of breath palpitations dizziness lightheadedness diaphoresis.  No lower extremity swelling or pain.  Vital signs notable for mild tachycardia.  Otherwise nonactionable.  Physical exam with slightly uncomfortable appearing female nontoxic in appearance.  Tachycardic no murmurs lungs clear to auscultation mild diffuse abdominal tenderness to palpation greatest in the right mid abdomen with some voluntary guarding no rebound or referred pain.  Extremities are warm and well-perfused.  There is no lower extremity edema.  There are no skin rashes noted.  Patient has shellfish allergy requiring pretreatment for CAT scan.  Plan to get CT abdomen pelvis to evaluate for surgical pathology such as appendicitis versus colitis versus complication related to her known pancreatic mass.  Pancreatitis is in the differential.  Foodborne illness is in the differential.  Given her history of CAD with complaint of chest pain we will also evaluate for ACS.  Her EKG was performed and is as documented above without significant change from prior.  Symptomatic control follow-up results of labs and imaging dispo dependent upon above.

## 2025-03-29 NOTE — ED PROVIDER NOTE - CLINICAL SUMMARY MEDICAL DECISION MAKING FREE TEXT BOX
TOBIAS Hewitt PGY2- patient here with several days of abdominal pain, with multiple episodes of vomiting since this evening and left chest pain starting en route to hospital. tachycardic but afebrile, RLQ tenderness on exam. EKG with TWI which appears similar to prior. ddx including appy, pancreatitis, colitis, UTI, gastritis, ACS. will obtain labs, CXR, CT abd/pelvis, UA, give fluids, zofran, analgesics. dispo pending workup and clinical course.

## 2025-03-29 NOTE — ED PROVIDER NOTE - PHYSICAL EXAMINATION
General: appears uncomfortable  Psych: mood appropriate  Head: normocephalic; atraumatic  Eyes: conjunctivae clear bilaterally, sclerae anicteric  ENT: no nasal flaring, patent nares  Cardio: tachycardia with regular rhythm  Resp: clear to auscultation bilaterally  GI: abdomen soft, nondistended; RLQ tenderness to palpation  Neuro: A&Ox3  Skin: no rashes or bruising noted  MSK: normal movement of extremities  Lymph/Vasc: no LE edema General: appears uncomfortable  Psych: mood appropriate  Head: normocephalic; atraumatic  Eyes: conjunctivae clear bilaterally, sclerae anicteric  ENT: no nasal flaring, patent nares  Cardio: tachycardia with regular rhythm  Resp: clear to auscultation bilaterally  GI: abdomen soft, nondistended; RLQ tenderness to palpation  Neuro: A&Ox3  Skin: no rashes or bruising noted  MSK: normal movement of extremities; chest pain not reproducible with palpation  Lymph/Vasc: no LE edema

## 2025-03-29 NOTE — ED ADULT NURSE NOTE - NSFALLUNIVINTERV_ED_ALL_ED
Bed/Stretcher in lowest position, wheels locked, appropriate side rails in place/Call bell, personal items and telephone in reach/Instruct patient to call for assistance before getting out of bed/chair/stretcher/Non-slip footwear applied when patient is off stretcher/[place] to call system/Physically safe environment - no spills, clutter or unnecessary equipment/Purposeful proactive rounding/Room/bathroom lighting operational, light cord in reach

## 2025-03-29 NOTE — ED PROVIDER NOTE - NS ED MD DISPO DISCHARGE
Birth Control Pills Pregnancy And Lactation Text: This medication should be avoided if pregnant and for the first 30 days post-partum. Sarecycline Pregnancy And Lactation Text: This medication is Pregnancy Category D and not consider safe during pregnancy. It is also excreted in breast milk. Tazorac Counseling:  Patient advised that medication is irritating and drying.  Patient may need to apply sparingly and wash off after an hour before eventually leaving it on overnight.  The patient verbalized understanding of the proper use and possible adverse effects of tazorac.  All of the patient's questions and concerns were addressed. Topical Clindamycin Counseling: Patient counseled that this medication may cause skin irritation or allergic reactions.  In the event of skin irritation, the patient was advised to reduce the amount of the drug applied or use it less frequently.   The patient verbalized understanding of the proper use and possible adverse effects of clindamycin.  All of the patient's questions and concerns were addressed. Erythromycin Counseling:  I discussed with the patient the risks of erythromycin including but not limited to GI upset, allergic reaction, drug rash, diarrhea, increase in liver enzymes, and yeast infections. Azithromycin Pregnancy And Lactation Text: This medication is considered safe during pregnancy and is also secreted in breast milk. High Dose Vitamin A Pregnancy And Lactation Text: High dose vitamin A therapy is contraindicated during pregnancy and breast feeding. Spironolactone Counseling: Patient advised regarding risks of diarrhea, abdominal pain, hyperkalemia, birth defects (for female patients), liver toxicity and renal toxicity. The patient may need blood work to monitor liver and kidney function and potassium levels while on therapy. The patient verbalized understanding of the proper use and possible adverse effects of spironolactone.  All of the patient's questions and concerns were addressed. Topical Retinoid Pregnancy And Lactation Text: This medication is Pregnancy Category C. It is unknown if this medication is excreted in breast milk. Dapsone Counseling: I discussed with the patient the risks of dapsone including but not limited to hemolytic anemia, agranulocytosis, rashes, methemoglobinemia, kidney failure, peripheral neuropathy, headaches, GI upset, and liver toxicity.  Patients who start dapsone require monitoring including baseline LFTs and weekly CBCs for the first month, then every month thereafter.  The patient verbalized understanding of the proper use and possible adverse effects of dapsone.  All of the patient's questions and concerns were addressed. Erythromycin Pregnancy And Lactation Text: This medication is Pregnancy Category B and is considered safe during pregnancy. It is also excreted in breast milk. Bactrim Counseling:  I discussed with the patient the risks of sulfa antibiotics including but not limited to GI upset, allergic reaction, drug rash, diarrhea, dizziness, photosensitivity, and yeast infections.  Rarely, more serious reactions can occur including but not limited to aplastic anemia, agranulocytosis, methemoglobinemia, blood dyscrasias, liver or kidney failure, lung infiltrates or desquamative/blistering drug rashes. Tetracycline Counseling: Patient counseled regarding possible photosensitivity and increased risk for sunburn.  Patient instructed to avoid sunlight, if possible.  When exposed to sunlight, patients should wear protective clothing, sunglasses, and sunscreen.  The patient was instructed to call the office immediately if the following severe adverse effects occur:  hearing changes, easy bruising/bleeding, severe headache, or vision changes.  The patient verbalized understanding of the proper use and possible adverse effects of tetracycline.  All of the patient's questions and concerns were addressed. Patient understands to avoid pregnancy while on therapy due to potential birth defects. Minocycline Counseling: Patient advised regarding possible photosensitivity and discoloration of the teeth, skin, lips, tongue and gums.  Patient instructed to avoid sunlight, if possible.  When exposed to sunlight, patients should wear protective clothing, sunglasses, and sunscreen.  The patient was instructed to call the office immediately if the following severe adverse effects occur:  hearing changes, easy bruising/bleeding, severe headache, or vision changes.  The patient verbalized understanding of the proper use and possible adverse effects of minocycline.  All of the patient's questions and concerns were addressed. Topical Sulfur Applications Pregnancy And Lactation Text: This medication is Pregnancy Category C and has an unknown safety profile during pregnancy. It is unknown if this topical medication is excreted in breast milk. Dapsone Pregnancy And Lactation Text: This medication is Pregnancy Category C and is not considered safe during pregnancy or breast feeding. Isotretinoin Counseling: Patient should get monthly blood tests, not donate blood, not drive at night if vision affected, not share medication, and not undergo elective surgery for 6 months after tx completed. Side effects reviewed, pt to contact office should one occur. Spironolactone Pregnancy And Lactation Text: This medication can cause feminization of the male fetus and should be avoided during pregnancy. The active metabolite is also found in breast milk. Topical Retinoid counseling:  Patient advised to apply a pea-sized amount only at bedtime and wait 30 minutes after washing their face before applying.  If too drying, patient may add a non-comedogenic moisturizer. The patient verbalized understanding of the proper use and possible adverse effects of retinoids.  All of the patient's questions and concerns were addressed. Bactrim Pregnancy And Lactation Text: This medication is Pregnancy Category D and is known to cause fetal risk.  It is also excreted in breast milk. Topical Sulfur Applications Counseling: Topical Sulfur Counseling: Patient counseled that this medication may cause skin irritation or allergic reactions.  In the event of skin irritation, the patient was advised to reduce the amount of the drug applied or use it less frequently.   The patient verbalized understanding of the proper use and possible adverse effects of topical sulfur application.  All of the patient's questions and concerns were addressed. Doxycycline Counseling:  Patient counseled regarding possible photosensitivity and increased risk for sunburn.  Patient instructed to avoid sunlight, if possible.  When exposed to sunlight, patients should wear protective clothing, sunglasses, and sunscreen.  The patient was instructed to call the office immediately if the following severe adverse effects occur:  hearing changes, easy bruising/bleeding, severe headache, or vision changes.  The patient verbalized understanding of the proper use and possible adverse effects of doxycycline.  All of the patient's questions and concerns were addressed. Benzoyl Peroxide Pregnancy And Lactation Text: This medication is Pregnancy Category C. It is unknown if benzoyl peroxide is excreted in breast milk. Include Pregnancy/Lactation Warning?: No Isotretinoin Pregnancy And Lactation Text: This medication is Pregnancy Category X and is considered extremely dangerous during pregnancy. It is unknown if it is excreted in breast milk. Detail Level: Zone Sarecycline Counseling: Patient advised regarding possible photosensitivity and discoloration of the teeth, skin, lips, tongue and gums.  Patient instructed to avoid sunlight, if possible.  When exposed to sunlight, patients should wear protective clothing, sunglasses, and sunscreen.  The patient was instructed to call the office immediately if the following severe adverse effects occur:  hearing changes, easy bruising/bleeding, severe headache, or vision changes.  The patient verbalized understanding of the proper use and possible adverse effects of sarecycline.  All of the patient's questions and concerns were addressed. Birth Control Pills Counseling: Birth Control Pill Counseling: I discussed with the patient the potential side effects of OCPs including but not limited to increased risk of stroke, heart attack, thrombophlebitis, deep venous thrombosis, hepatic adenomas, breast changes, GI upset, headaches, and depression.  The patient verbalized understanding of the proper use and possible adverse effects of OCPs. All of the patient's questions and concerns were addressed. Doxycycline Pregnancy And Lactation Text: This medication is Pregnancy Category D and not consider safe during pregnancy. It is also excreted in breast milk but is considered safe for shorter treatment courses. Benzoyl Peroxide Counseling: Patient counseled that medicine may cause skin irritation and bleach clothing.  In the event of skin irritation, the patient was advised to reduce the amount of the drug applied or use it less frequently.   The patient verbalized understanding of the proper use and possible adverse effects of benzoyl peroxide.  All of the patient's questions and concerns were addressed. Topical Clindamycin Pregnancy And Lactation Text: This medication is Pregnancy Category B and is considered safe during pregnancy. It is unknown if it is excreted in breast milk. Tazorac Pregnancy And Lactation Text: This medication is not safe during pregnancy. It is unknown if this medication is excreted in breast milk. Azithromycin Counseling:  I discussed with the patient the risks of azithromycin including but not limited to GI upset, allergic reaction, drug rash, diarrhea, and yeast infections. High Dose Vitamin A Counseling: Side effects reviewed, pt to contact office should one occur. Home

## 2025-03-29 NOTE — ED PROVIDER NOTE - NSFOLLOWUPINSTRUCTIONS_ED_ALL_ED_FT
Take Zofran (sent to your pharmacy) every 8 hours as needed for nausea/vomiting.    Rest and keep yourself hydrated with fluids.     Take tylenol as needed for pain.    Follow up with your primary care provider within a week.    Nausea is the feeling that you have to vomit. As nausea gets worse, it can lead to vomiting. Vomiting puts you at an increased risk for dehydration. Older adults and people with other diseases or a weak immune system are at higher risk for dehydration. Drink clear fluids in small but frequent amounts as tolerated. Eat bland, easy-to-digest foods in small amounts as tolerated.    SEEK IMMEDIATE MEDICAL CARE IF YOU HAVE ANY OF THE FOLLOWING SYMPTOMS: fever, inability to keep sufficient fluids down, black or bloody vomitus, black or bloody stools, lightheadedness/dizziness, chest pain, severe headache, rash, shortness of breath, cold or clammy skin, confusion, pain with urination, or any signs of dehydration.

## 2025-03-29 NOTE — ED PROVIDER NOTE - PATIENT PORTAL LINK FT
You can access the FollowMyHealth Patient Portal offered by [facility] by registering at the following website: [email or web address]. By joining [facility]’s FollowMyHealth portal, you will also be able to view your health information using other applications (apps) compatible with our system.

## 2025-03-29 NOTE — ED ADULT NURSE NOTE - NSICDXPASTMEDICALHX_GEN_ALL_CORE_FT
PAST MEDICAL HISTORY:  Chronic GERD     H/O Prinzmetal angina     Hypertrophy of breast     Insomnia     Malignant melanoma, unspecified site R arm    Pulmonary embolism     Seasonal allergies

## 2025-03-29 NOTE — ED PROVIDER NOTE - OBJECTIVE STATEMENT
The patient is a 58 y/o F with past medical history of PE on eliquis, CAD with no stents, benign pancreatic mass (followed by [facility] [place]), cholecystectomy, prinzmetal's angina, presenting with abdominal pain and vomiting. Pain is primarily located in the RLQ, has been worsening over the past 5 days. Acutely worsening overnight this evening around midnight when patient began having episodes of nonbloody nonbilious vomiting, states has had approx 1 episode every hour since that time. Also notes in car on way to hospital began having intermittent left sided chest pain. denies any fever, shortness of breath, diarrhea, dysuria, The patient is a 58 y/o F with past medical history of PE on eliquis, CAD with no stents, benign pancreatic mass (followed by [facility] [place]), cholecystectomy, prinzmetal's angina, presenting with abdominal pain and vomiting. Pain is primarily located in the RLQ, has been worsening over the past 5 days. Acutely worsening overnight this evening around midnight when patient began having episodes of nonbloody nonbilious vomiting, states has had approx 1 episode every hour since that time. Also notes in car on way to hospital began having intermittent left sided chest pain. denies any fever, shortness of breath, diarrhea, dysuria, or any other symptoms.

## 2025-03-29 NOTE — ED ADULT NURSE NOTE - OBJECTIVE STATEMENT
Patient c/o RLQ pain since Tuesday and vomiting since 1230am today. Patient has hx pancreatic mass, prior PE, CAD, cholecystectomy. Patient denies fever, chills, sob. Patient states on her way to the hospital she started experiencing intermittent chest pain. Diffuse abdominal tenderness, worse on RLQ. Patient A&Ox4, ambulatory. No signs of acute distress at this time. Plan of care in progress.

## 2025-04-01 ENCOUNTER — RX CHANGE (OUTPATIENT)
Age: 60
End: 2025-04-01

## 2025-04-25 ENCOUNTER — RX RENEWAL (OUTPATIENT)
Age: 60
End: 2025-04-25

## 2025-04-30 ENCOUNTER — APPOINTMENT (OUTPATIENT)
Dept: PULMONOLOGY | Facility: CLINIC | Age: 60
End: 2025-04-30
Payer: COMMERCIAL

## 2025-04-30 VITALS
OXYGEN SATURATION: 98 % | TEMPERATURE: 97.3 F | BODY MASS INDEX: 25.03 KG/M2 | RESPIRATION RATE: 16 BRPM | HEIGHT: 69 IN | SYSTOLIC BLOOD PRESSURE: 118 MMHG | HEART RATE: 91 BPM | WEIGHT: 169 LBS | DIASTOLIC BLOOD PRESSURE: 64 MMHG

## 2025-04-30 DIAGNOSIS — J45.50 SEVERE PERSISTENT ASTHMA, UNCOMPLICATED: ICD-10-CM

## 2025-04-30 DIAGNOSIS — K21.9 GASTRO-ESOPHAGEAL REFLUX DISEASE W/OUT ESOPHAGITIS: ICD-10-CM

## 2025-04-30 DIAGNOSIS — J30.2 OTHER SEASONAL ALLERGIC RHINITIS: ICD-10-CM

## 2025-04-30 DIAGNOSIS — R93.89 ABNORMAL FINDINGS ON DIAGNOSTIC IMAGING OF OTHER SPECIFIED BODY STRUCTURES: ICD-10-CM

## 2025-04-30 DIAGNOSIS — J06.9 ACUTE UPPER RESPIRATORY INFECTION, UNSPECIFIED: ICD-10-CM

## 2025-04-30 DIAGNOSIS — G25.81 RESTLESS LEGS SYNDROME: ICD-10-CM

## 2025-04-30 DIAGNOSIS — I26.99 OTHER PULMONARY EMBOLISM W/OUT ACUTE COR PULMONALE: ICD-10-CM

## 2025-04-30 PROCEDURE — 94010 BREATHING CAPACITY TEST: CPT

## 2025-04-30 PROCEDURE — 99214 OFFICE O/P EST MOD 30 MIN: CPT | Mod: 25

## 2025-04-30 RX ORDER — BACLOFEN 5 MG/1
5 TABLET ORAL
Qty: 360 | Refills: 1 | Status: ACTIVE | COMMUNITY
Start: 2025-04-30 | End: 1900-01-01

## 2025-04-30 RX ORDER — GABAPENTIN 100 MG/1
100 CAPSULE ORAL 3 TIMES DAILY
Qty: 270 | Refills: 1 | Status: ACTIVE | COMMUNITY
Start: 2025-04-30 | End: 1900-01-01

## 2025-05-01 ENCOUNTER — APPOINTMENT (OUTPATIENT)
Dept: CARDIOLOGY | Facility: CLINIC | Age: 60
End: 2025-05-01

## 2025-05-01 DIAGNOSIS — I25.10 ATHEROSCLEROTIC HEART DISEASE OF NATIVE CORONARY ARTERY W/OUT ANGINA PECTORIS: ICD-10-CM

## 2025-05-01 DIAGNOSIS — E78.5 HYPERLIPIDEMIA, UNSPECIFIED: ICD-10-CM

## 2025-05-01 DIAGNOSIS — Z86.711 PERSONAL HISTORY OF PULMONARY EMBOLISM: ICD-10-CM

## 2025-05-06 ENCOUNTER — TRANSCRIPTION ENCOUNTER (OUTPATIENT)
Age: 60
End: 2025-05-06

## 2025-05-06 LAB
BASOPHILS # BLD AUTO: 0.03 K/UL
BASOPHILS NFR BLD AUTO: 0.7 %
EOSINOPHIL # BLD AUTO: 0.12 K/UL
EOSINOPHIL NFR BLD AUTO: 2.7 %
FERRITIN SERPL-MCNC: 86 NG/ML
HCT VFR BLD CALC: 41.6 %
HGB BLD-MCNC: 13 G/DL
IMM GRANULOCYTES NFR BLD AUTO: 0.2 %
IRON SATN MFR SERPL: 39 %
IRON SERPL-MCNC: 123 UG/DL
LYMPHOCYTES # BLD AUTO: 1.9 K/UL
LYMPHOCYTES NFR BLD AUTO: 43.3 %
MAN DIFF?: NORMAL
MCHC RBC-ENTMCNC: 29.3 PG
MCHC RBC-ENTMCNC: 31.3 G/DL
MCV RBC AUTO: 93.9 FL
MONOCYTES # BLD AUTO: 0.3 K/UL
MONOCYTES NFR BLD AUTO: 6.8 %
NEUTROPHILS # BLD AUTO: 2.03 K/UL
NEUTROPHILS NFR BLD AUTO: 46.3 %
PLATELET # BLD AUTO: 324 K/UL
RBC # BLD: 4.43 M/UL
RBC # FLD: 12.3 %
TIBC SERPL-MCNC: 313 UG/DL
UIBC SERPL-MCNC: 190 UG/DL
WBC # FLD AUTO: 4.39 K/UL

## 2025-05-12 ENCOUNTER — APPOINTMENT (OUTPATIENT)
Dept: CT IMAGING | Facility: CLINIC | Age: 60
End: 2025-05-12

## 2025-05-12 PROCEDURE — 71250 CT THORAX DX C-: CPT

## 2025-05-20 ENCOUNTER — TRANSCRIPTION ENCOUNTER (OUTPATIENT)
Age: 60
End: 2025-05-20

## 2025-05-21 ENCOUNTER — TRANSCRIPTION ENCOUNTER (OUTPATIENT)
Age: 60
End: 2025-05-21

## 2025-05-21 DIAGNOSIS — E07.9 DISORDER OF THYROID, UNSPECIFIED: ICD-10-CM

## 2025-05-23 ENCOUNTER — TRANSCRIPTION ENCOUNTER (OUTPATIENT)
Age: 60
End: 2025-05-23

## 2025-05-24 ENCOUNTER — APPOINTMENT (OUTPATIENT)
Dept: ULTRASOUND IMAGING | Facility: CLINIC | Age: 60
End: 2025-05-24

## 2025-05-24 PROCEDURE — 76536 US EXAM OF HEAD AND NECK: CPT

## 2025-05-28 ENCOUNTER — TRANSCRIPTION ENCOUNTER (OUTPATIENT)
Age: 60
End: 2025-05-28

## 2025-05-30 NOTE — H&P ADULT - NSHPREVIEWOFSYSTEMS_GEN_ALL_CORE
2nd attempt:    Placed call to patient and left vm via PI ID # [id number] to call back to schedule an ED f/u appt.    Please assist patient in scheduling with Dr. Mai or any available trusted provider.   HEENT: no headache, changes in hearing, +some blurry vision R eye yesterday-resolved  CV: denies chest pain, palpitations  Resp: no cough, no SOB  Breast: +pain, +erythema, + swelling  GI: + mild nausea, no vomiting, no change in bowel habits, no abd pain  : no pain or difficulty with urination

## 2025-06-02 ENCOUNTER — APPOINTMENT (OUTPATIENT)
Dept: CARDIOLOGY | Facility: CLINIC | Age: 60
End: 2025-06-02
Payer: COMMERCIAL

## 2025-06-02 ENCOUNTER — NON-APPOINTMENT (OUTPATIENT)
Age: 60
End: 2025-06-02

## 2025-06-02 VITALS
BODY MASS INDEX: 25.25 KG/M2 | HEART RATE: 90 BPM | WEIGHT: 171 LBS | OXYGEN SATURATION: 97 % | DIASTOLIC BLOOD PRESSURE: 70 MMHG | SYSTOLIC BLOOD PRESSURE: 110 MMHG

## 2025-06-02 DIAGNOSIS — R06.02 SHORTNESS OF BREATH: ICD-10-CM

## 2025-06-02 DIAGNOSIS — E78.5 HYPERLIPIDEMIA, UNSPECIFIED: ICD-10-CM

## 2025-06-02 DIAGNOSIS — I25.10 ATHEROSCLEROTIC HEART DISEASE OF NATIVE CORONARY ARTERY W/OUT ANGINA PECTORIS: ICD-10-CM

## 2025-06-02 PROCEDURE — 96372 THER/PROPH/DIAG INJ SC/IM: CPT

## 2025-06-02 PROCEDURE — 99214 OFFICE O/P EST MOD 30 MIN: CPT | Mod: 25

## 2025-06-02 PROCEDURE — 93000 ELECTROCARDIOGRAM COMPLETE: CPT

## 2025-06-03 ENCOUNTER — TRANSCRIPTION ENCOUNTER (OUTPATIENT)
Age: 60
End: 2025-06-03

## 2025-06-04 ENCOUNTER — TRANSCRIPTION ENCOUNTER (OUTPATIENT)
Age: 60
End: 2025-06-04

## 2025-06-04 DIAGNOSIS — E04.1 NONTOXIC SINGLE THYROID NODULE: ICD-10-CM

## 2025-06-05 ENCOUNTER — TRANSCRIPTION ENCOUNTER (OUTPATIENT)
Age: 60
End: 2025-06-05

## 2025-06-06 ENCOUNTER — TRANSCRIPTION ENCOUNTER (OUTPATIENT)
Age: 60
End: 2025-06-06

## 2025-06-13 ENCOUNTER — RX RENEWAL (OUTPATIENT)
Age: 60
End: 2025-06-13

## 2025-06-16 ENCOUNTER — TRANSCRIPTION ENCOUNTER (OUTPATIENT)
Age: 60
End: 2025-06-16

## 2025-06-19 ENCOUNTER — APPOINTMENT (OUTPATIENT)
Dept: SURGERY | Facility: CLINIC | Age: 60
End: 2025-06-19
Payer: COMMERCIAL

## 2025-06-19 VITALS
SYSTOLIC BLOOD PRESSURE: 100 MMHG | WEIGHT: 170 LBS | HEIGHT: 69 IN | HEART RATE: 99 BPM | DIASTOLIC BLOOD PRESSURE: 67 MMHG | BODY MASS INDEX: 25.18 KG/M2

## 2025-06-19 PROCEDURE — 99204 OFFICE O/P NEW MOD 45 MIN: CPT | Mod: 25

## 2025-06-20 LAB
T3 SERPL-MCNC: 94 NG/DL
T4 FREE SERPL-MCNC: 1 NG/DL
THYROGLOB AB SERPL-ACNC: 15 IU/ML
THYROPEROXIDASE AB SERPL IA-ACNC: 14.5 IU/ML
TSH SERPL-ACNC: 0.46 UIU/ML

## 2025-07-02 ENCOUNTER — TRANSCRIPTION ENCOUNTER (OUTPATIENT)
Age: 60
End: 2025-07-02

## 2025-07-02 RX ORDER — PREDNISONE 10 MG/1
10 TABLET ORAL
Qty: 21 | Refills: 0 | Status: ACTIVE | COMMUNITY
Start: 2025-07-02 | End: 1900-01-01

## 2025-07-03 ENCOUNTER — TRANSCRIPTION ENCOUNTER (OUTPATIENT)
Age: 60
End: 2025-07-03

## 2025-07-03 RX ORDER — AZITHROMYCIN 500 MG/1
500 TABLET, FILM COATED ORAL DAILY
Qty: 7 | Refills: 0 | Status: ACTIVE | COMMUNITY
Start: 2025-07-03 | End: 1900-01-01

## 2025-07-08 ENCOUNTER — TRANSCRIPTION ENCOUNTER (OUTPATIENT)
Age: 60
End: 2025-07-08

## 2025-07-10 ENCOUNTER — TRANSCRIPTION ENCOUNTER (OUTPATIENT)
Age: 60
End: 2025-07-10

## 2025-07-18 ENCOUNTER — TRANSCRIPTION ENCOUNTER (OUTPATIENT)
Age: 60
End: 2025-07-18

## 2025-07-18 RX ORDER — BENZONATATE 200 MG/1
200 CAPSULE ORAL 3 TIMES DAILY
Qty: 90 | Refills: 1 | Status: ACTIVE | COMMUNITY
Start: 2025-07-18 | End: 1900-01-01

## 2025-07-31 ENCOUNTER — APPOINTMENT (OUTPATIENT)
Dept: PULMONOLOGY | Facility: CLINIC | Age: 60
End: 2025-07-31
Payer: COMMERCIAL

## 2025-07-31 VITALS
TEMPERATURE: 97.8 F | SYSTOLIC BLOOD PRESSURE: 102 MMHG | OXYGEN SATURATION: 97 % | RESPIRATION RATE: 16 BRPM | DIASTOLIC BLOOD PRESSURE: 68 MMHG | WEIGHT: 178 LBS | BODY MASS INDEX: 24.92 KG/M2 | HEART RATE: 86 BPM | HEIGHT: 71 IN

## 2025-07-31 DIAGNOSIS — G25.81 RESTLESS LEGS SYNDROME: ICD-10-CM

## 2025-07-31 DIAGNOSIS — J20.9 ACUTE BRONCHITIS, UNSPECIFIED: ICD-10-CM

## 2025-07-31 DIAGNOSIS — J45.50 SEVERE PERSISTENT ASTHMA, UNCOMPLICATED: ICD-10-CM

## 2025-07-31 DIAGNOSIS — J45.901 ACUTE BRONCHITIS, UNSPECIFIED: ICD-10-CM

## 2025-07-31 DIAGNOSIS — J32.9 CHRONIC SINUSITIS, UNSPECIFIED: ICD-10-CM

## 2025-07-31 DIAGNOSIS — R93.89 ABNORMAL FINDINGS ON DIAGNOSTIC IMAGING OF OTHER SPECIFIED BODY STRUCTURES: ICD-10-CM

## 2025-07-31 DIAGNOSIS — K21.9 GASTRO-ESOPHAGEAL REFLUX DISEASE W/OUT ESOPHAGITIS: ICD-10-CM

## 2025-07-31 DIAGNOSIS — J45.909 UNSPECIFIED ASTHMA, UNCOMPLICATED: ICD-10-CM

## 2025-07-31 PROCEDURE — 71046 X-RAY EXAM CHEST 2 VIEWS: CPT

## 2025-07-31 PROCEDURE — 99214 OFFICE O/P EST MOD 30 MIN: CPT | Mod: 25

## 2025-08-03 LAB
M PNEUMO IGG SER IA-ACNC: NEGATIVE
M PNEUMO IGG SER QL IA: 0.63 INDEX
M PNEUMO IGM SER QL IA: 1.24 INDEX
MYCOPLASMA AG SPEC QL: POSITIVE

## 2025-08-04 ENCOUNTER — RX RENEWAL (OUTPATIENT)
Age: 60
End: 2025-08-04

## 2025-08-04 ENCOUNTER — TRANSCRIPTION ENCOUNTER (OUTPATIENT)
Age: 60
End: 2025-08-04

## 2025-08-04 DIAGNOSIS — J15.7 PNEUMONIA DUE TO MYCOPLASMA PNEUMONIAE: ICD-10-CM

## 2025-08-04 RX ORDER — AZITHROMYCIN 500 MG/1
500 TABLET, FILM COATED ORAL DAILY
Qty: 7 | Refills: 0 | Status: ACTIVE | COMMUNITY
Start: 2025-08-04 | End: 1900-01-01

## 2025-08-06 ENCOUNTER — TRANSCRIPTION ENCOUNTER (OUTPATIENT)
Age: 60
End: 2025-08-06

## 2025-08-06 LAB
B PERT IGG SER-ACNC: 1.34 INDEX
B PERT IGM SER-ACNC: <1 INDEX

## 2025-08-06 RX ORDER — IPRATROPIUM BROMIDE 0.5 MG/2.5ML
0.02 SOLUTION RESPIRATORY (INHALATION) 4 TIMES DAILY
Qty: 1 | Refills: 0 | Status: ACTIVE | COMMUNITY
Start: 2025-08-06 | End: 1900-01-01

## 2025-08-07 LAB
CHLAMYDIA AB SER-ACNC: NORMAL
CHLAMYDIA PNEUMONIAE AB IGA: NORMAL
CHLAMYDIA PNEUMONIAE AB IGG: NORMAL
CHLAMYDIA PNEUMONIAE AB IGM: NORMAL

## 2025-08-12 ENCOUNTER — TRANSCRIPTION ENCOUNTER (OUTPATIENT)
Age: 60
End: 2025-08-12

## 2025-08-14 ENCOUNTER — TRANSCRIPTION ENCOUNTER (OUTPATIENT)
Age: 60
End: 2025-08-14

## 2025-08-26 ENCOUNTER — APPOINTMENT (OUTPATIENT)
Dept: DERMATOLOGY | Facility: CLINIC | Age: 60
End: 2025-08-26